# Patient Record
Sex: MALE | Race: WHITE | Employment: FULL TIME | URBAN - METROPOLITAN AREA
[De-identification: names, ages, dates, MRNs, and addresses within clinical notes are randomized per-mention and may not be internally consistent; named-entity substitution may affect disease eponyms.]

---

## 2017-12-15 ENCOUNTER — OFFICE VISIT (OUTPATIENT)
Dept: INTERNAL MEDICINE CLINIC | Age: 57
End: 2017-12-15

## 2017-12-15 VITALS
WEIGHT: 266.4 LBS | HEIGHT: 71 IN | SYSTOLIC BLOOD PRESSURE: 120 MMHG | TEMPERATURE: 97.9 F | HEART RATE: 95 BPM | BODY MASS INDEX: 37.3 KG/M2 | OXYGEN SATURATION: 98 % | DIASTOLIC BLOOD PRESSURE: 80 MMHG

## 2017-12-15 DIAGNOSIS — Z79.4 TYPE 2 DIABETES MELLITUS WITHOUT COMPLICATION, WITH LONG-TERM CURRENT USE OF INSULIN (HCC): Primary | ICD-10-CM

## 2017-12-15 DIAGNOSIS — I10 BENIGN ESSENTIAL HTN: ICD-10-CM

## 2017-12-15 DIAGNOSIS — M54.31 SCIATICA, RIGHT SIDE: ICD-10-CM

## 2017-12-15 DIAGNOSIS — E11.9 TYPE 2 DIABETES MELLITUS WITHOUT COMPLICATION, WITH LONG-TERM CURRENT USE OF INSULIN (HCC): Primary | ICD-10-CM

## 2017-12-15 DIAGNOSIS — R05.9 COUGH: ICD-10-CM

## 2017-12-15 LAB
GLUCOSE BLD-MCNC: 280 MG/DL
HBA1C MFR BLD: 12.7 %

## 2017-12-15 PROCEDURE — 82962 GLUCOSE BLOOD TEST: CPT | Performed by: INTERNAL MEDICINE

## 2017-12-15 PROCEDURE — 99214 OFFICE O/P EST MOD 30 MIN: CPT | Performed by: INTERNAL MEDICINE

## 2017-12-15 PROCEDURE — 83036 HEMOGLOBIN GLYCOSYLATED A1C: CPT | Performed by: INTERNAL MEDICINE

## 2017-12-15 RX ORDER — GLIPIZIDE 5 MG/1
TABLET, FILM COATED, EXTENDED RELEASE ORAL
Qty: 90 TABLET | Refills: 0 | Status: CANCELLED | OUTPATIENT
Start: 2017-12-15

## 2017-12-15 RX ORDER — BLOOD SUGAR DIAGNOSTIC
1 STRIP MISCELLANEOUS DAILY
Qty: 100 EACH | Refills: 3 | Status: SHIPPED | OUTPATIENT
Start: 2017-12-15 | End: 2019-09-23 | Stop reason: SDUPTHER

## 2017-12-15 RX ORDER — GABAPENTIN 300 MG/1
300 CAPSULE ORAL 3 TIMES DAILY PRN
Qty: 90 CAPSULE | Refills: 3 | Status: SHIPPED | OUTPATIENT
Start: 2017-12-15 | End: 2018-03-26

## 2017-12-15 RX ORDER — METFORMIN HYDROCHLORIDE 500 MG/1
TABLET, EXTENDED RELEASE ORAL
Qty: 120 TABLET | Refills: 3 | Status: SHIPPED | OUTPATIENT
Start: 2017-12-15 | End: 2018-12-28 | Stop reason: SDUPTHER

## 2017-12-15 RX ORDER — AZITHROMYCIN 250 MG/1
TABLET, FILM COATED ORAL
Qty: 1 PACKET | Refills: 0 | Status: SHIPPED | OUTPATIENT
Start: 2017-12-15 | End: 2017-12-25

## 2017-12-15 RX ORDER — ATORVASTATIN CALCIUM 20 MG/1
20 TABLET, FILM COATED ORAL DAILY
Qty: 30 TABLET | Refills: 3 | Status: SHIPPED | OUTPATIENT
Start: 2017-12-15 | End: 2018-12-28 | Stop reason: SDUPTHER

## 2017-12-15 RX ORDER — LISINOPRIL AND HYDROCHLOROTHIAZIDE 20; 12.5 MG/1; MG/1
2 TABLET ORAL DAILY
Qty: 60 TABLET | Refills: 5 | Status: SHIPPED | OUTPATIENT
Start: 2017-12-15 | End: 2018-05-18 | Stop reason: SDUPTHER

## 2017-12-15 ASSESSMENT — ENCOUNTER SYMPTOMS: BACK PAIN: 1

## 2017-12-15 ASSESSMENT — PATIENT HEALTH QUESTIONNAIRE - PHQ9
SUM OF ALL RESPONSES TO PHQ QUESTIONS 1-9: 0
1. LITTLE INTEREST OR PLEASURE IN DOING THINGS: 0
2. FEELING DOWN, DEPRESSED OR HOPELESS: 0
SUM OF ALL RESPONSES TO PHQ9 QUESTIONS 1 & 2: 0

## 2017-12-15 NOTE — PROGRESS NOTES
OUTPATIENT PROGRESS NOTE  Date of Service:  12/15/2017  Address: [address] [place] [address] INTERNAL MEDICINE  [address] [place] [address]  Dept: [phone]    Subjective:      Chief Complaint   Patient presents with    Diabetes     cough      Patient ID: [id number]  Troy Smith is a 61 y.o. male    HPIwith dm2, htn former smoker who was last seen 1.5 years ago. He has been taking all of his meds tammy nilly and now gets novolin N otc from [facility]  He has a cough right now x 2 weeks. Started out with malaise and fever and cough persists with some phlegm and occas wheeze. Also report pain down right sciatic which is severe and prevents him from walking around work. Using advil. Neurontin has helped in the past.    Allergies   Allergen Reactions    Codeine      itch    Tradjenta [Linagliptin] Hives     Current Outpatient Prescriptions   Medication Sig Dispense Refill    metFORMIN (GLUCOPHAGE-XR) 500 MG extended release tablet TAKE FOUR TABLETS BY MOUTH ONCE DAILY 120 tablet 0    atorvastatin (LIPITOR) 20 MG tablet Take 1 tablet by mouth daily To lower cholesterol 30 tablet 3    insulin NPH (NOVOLIN N) 100 UNIT/ML injection vial Start with 20 units every morning than raise insulin by 1 unit per day until sugars stay in 100-150 before supper 1 vial 3    Insulin Pen Needle ([employer] PEN NEEDLES 29G) 29G X 12MM MISC 1 each by Does not apply route daily 100 each 3    L-methylfolate-B6-B12 (METANX) 5-80.611-6-11 MG CAPS capsule Take 1 capsule by mouth daily 30 capsule 3    lisinopril-hydrochlorothiazide (PRINZIDE) 20-12.5 MG per tablet Take 2 tablets by mouth daily qam for htn 60 tablet 0    glipiZIDE (GLUCOTROL) 5 MG ER tablet One in the morning and two with supper 90 tablet 0    SODIUM CHLORIDE, EXTERNAL, (SALINE WOUND WASH) 0.9 % SOLN Apply 1 spray topically daily 1 Can 1    aspirin (ANACIN) 81 MG EC tablet Take 81 mg by mouth daily.        No current exhibits no tenderness. Just occas cough lungs clear   Abdominal: Soft. Bowel sounds are normal. There is no tenderness. Musculoskeletal: Normal range of motion. He exhibits no edema or tenderness. Right sciatic is tender walks with limp  Neg SLR LE strgth intact   Lymphadenopathy:     He has no cervical adenopathy. Neurological: He is alert. He has normal reflexes. No cranial nerve deficit. He exhibits normal muscle tone. Gait normal.   Skin: Skin is warm and dry. No rash noted. Diffusely diaphoretic   Psychiatric: Mood, memory, affect and judgment normal.   Nursing note and vitals reviewed. a1c 12.7  Glucose 270  Assessment/Plan   1. Type 2 diabetes mellitus without complication, with long-term current use of insulin (HCC)  Very high sugar  - POCT glycosylated hemoglobin (Hb A1C)  Very unmotivated    2. HTN   Not so high when I check it  Plan : cont same  3. High cholesterol   He has not taken statin  Will reorder it      3  Cough ? Sinus infection  zpak and otc syrup  4.  Sciatica no warnings signs  Plan ok to start with neurontin            Jose Gonzalez MD

## 2018-03-26 ENCOUNTER — OFFICE VISIT (OUTPATIENT)
Dept: INTERNAL MEDICINE CLINIC | Age: 58
End: 2018-03-26

## 2018-03-26 VITALS
BODY MASS INDEX: 38.64 KG/M2 | OXYGEN SATURATION: 97 % | HEART RATE: 97 BPM | TEMPERATURE: 97.9 F | WEIGHT: 276 LBS | SYSTOLIC BLOOD PRESSURE: 136 MMHG | DIASTOLIC BLOOD PRESSURE: 86 MMHG | HEIGHT: 71 IN

## 2018-03-26 DIAGNOSIS — E11.9 TYPE 2 DIABETES MELLITUS WITHOUT COMPLICATION, WITH LONG-TERM CURRENT USE OF INSULIN (HCC): Primary | ICD-10-CM

## 2018-03-26 DIAGNOSIS — R09.89 DECREASED PEDAL PULSES: ICD-10-CM

## 2018-03-26 DIAGNOSIS — Z79.4 TYPE 2 DIABETES MELLITUS WITHOUT COMPLICATION, WITH LONG-TERM CURRENT USE OF INSULIN (HCC): Primary | ICD-10-CM

## 2018-03-26 DIAGNOSIS — I10 BENIGN ESSENTIAL HTN: ICD-10-CM

## 2018-03-26 DIAGNOSIS — E11.42 DIABETIC POLYNEUROPATHY ASSOCIATED WITH TYPE 2 DIABETES MELLITUS (HCC): ICD-10-CM

## 2018-03-26 LAB — HBA1C MFR BLD: 13.2 %

## 2018-03-26 PROCEDURE — 83036 HEMOGLOBIN GLYCOSYLATED A1C: CPT | Performed by: INTERNAL MEDICINE

## 2018-03-26 PROCEDURE — 99214 OFFICE O/P EST MOD 30 MIN: CPT | Performed by: INTERNAL MEDICINE

## 2018-03-26 RX ORDER — GLIPIZIDE 10 MG/1
TABLET, FILM COATED, EXTENDED RELEASE ORAL
Qty: 60 TABLET | Refills: 3 | Status: SHIPPED | OUTPATIENT
Start: 2018-03-26 | End: 2018-12-28 | Stop reason: SDUPTHER

## 2018-03-26 NOTE — PROGRESS NOTES
BP Readings from Last 2 Encounters:   03/26/18 136/86   12/15/17 120/80     Hemoglobin A1C (%)   Date Value   12/15/2017 12.7     Microalbumin, Random Urine (mg/dL)   Date Value   05/03/2016 7.30 (H)     LDL Calculated (mg/dL)   Date Value   11/25/2013 94              Tobacco use:  Patient  reports that he quit smoking about 3 years ago. His smoking use included Cigars. He has never used smokeless tobacco.    If Smoker - Cessation materials given? No    Is Daily aspirin on medication list?:  Yes    Diabetic retinal exam done? Yes   If yes, document in Health Maintenance. Monofilament placed on counter? Yes    Shoes and socks removed? Yes    BP taken with correct size cuff? Yes   Repeated if > 130/80 No     Microalbumin performed if applicable?   Yes

## 2018-04-17 ENCOUNTER — TELEPHONE (OUTPATIENT)
Dept: INTERNAL MEDICINE CLINIC | Age: 58
End: 2018-04-17

## 2018-05-18 RX ORDER — LISINOPRIL AND HYDROCHLOROTHIAZIDE 20; 12.5 MG/1; MG/1
TABLET ORAL
Qty: 180 TABLET | Refills: 1 | Status: SHIPPED | OUTPATIENT
Start: 2018-05-18 | End: 2018-12-28 | Stop reason: SDUPTHER

## 2019-01-15 ENCOUNTER — HOSPITAL ENCOUNTER (OUTPATIENT)
Dept: NON INVASIVE DIAGNOSTICS | Age: 59
Discharge: HOME | End: 2019-01-15
Payer: COMMERCIAL

## 2019-01-15 DIAGNOSIS — R07.9 CHEST PAIN, UNSPECIFIED TYPE: ICD-10-CM

## 2019-01-15 LAB
LV EF: 58 %
LV EF: 68 %
LVEF MODALITY: NORMAL
LVEF MODALITY: NORMAL

## 2019-01-15 PROCEDURE — 78452 HT MUSCLE IMAGE SPECT MULT: CPT

## 2019-01-15 PROCEDURE — C8929 TTE W OR WO FOL WCON,DOPPLER: HCPCS

## 2019-01-15 PROCEDURE — 3430000000 HC RX DIAGNOSTIC RADIOPHARMACEUTICAL: Performed by: INTERNAL MEDICINE

## 2019-01-15 PROCEDURE — 93306 TTE W/DOPPLER COMPLETE: CPT

## 2019-01-15 PROCEDURE — 6360000002 HC RX W HCPCS: Performed by: INTERNAL MEDICINE

## 2019-01-15 PROCEDURE — 93017 CV STRESS TEST TRACING ONLY: CPT

## 2019-01-15 PROCEDURE — A9502 TC99M TETROFOSMIN: HCPCS | Performed by: INTERNAL MEDICINE

## 2019-01-15 PROCEDURE — 6360000004 HC RX CONTRAST MEDICATION: Performed by: INTERNAL MEDICINE

## 2019-01-15 RX ADMIN — TETROFOSMIN 30 MILLICURIE: 0.23 INJECTION, POWDER, LYOPHILIZED, FOR SOLUTION INTRAVENOUS at 09:48

## 2019-01-15 RX ADMIN — REGADENOSON 0.4 MG: 0.08 INJECTION, SOLUTION INTRAVENOUS at 09:45

## 2019-01-15 RX ADMIN — TETROFOSMIN 10 MILLICURIE: 0.23 INJECTION, POWDER, LYOPHILIZED, FOR SOLUTION INTRAVENOUS at 08:36

## 2019-01-15 RX ADMIN — PERFLUTREN 1.43 MG: 6.52 INJECTION, SUSPENSION INTRAVENOUS at 10:41

## 2019-04-02 ENCOUNTER — OFFICE VISIT (OUTPATIENT)
Dept: FAMILY MEDICINE CLINIC | Age: 59
End: 2019-04-02
Payer: COMMERCIAL

## 2019-04-02 VITALS
BODY MASS INDEX: 38.42 KG/M2 | OXYGEN SATURATION: 98 % | SYSTOLIC BLOOD PRESSURE: 138 MMHG | TEMPERATURE: 98.8 F | HEART RATE: 94 BPM | RESPIRATION RATE: 20 BRPM | HEIGHT: 71 IN | WEIGHT: 274.4 LBS | DIASTOLIC BLOOD PRESSURE: 62 MMHG

## 2019-04-02 DIAGNOSIS — E78.00 HIGH CHOLESTEROL: ICD-10-CM

## 2019-04-02 DIAGNOSIS — I10 BENIGN ESSENTIAL HTN: ICD-10-CM

## 2019-04-02 DIAGNOSIS — Z12.11 SCREENING FOR COLON CANCER: ICD-10-CM

## 2019-04-02 DIAGNOSIS — E11.9 TYPE 2 DIABETES MELLITUS WITHOUT COMPLICATION, WITH LONG-TERM CURRENT USE OF INSULIN (HCC): Primary | ICD-10-CM

## 2019-04-02 DIAGNOSIS — Z79.4 TYPE 2 DIABETES MELLITUS WITHOUT COMPLICATION, WITH LONG-TERM CURRENT USE OF INSULIN (HCC): Primary | ICD-10-CM

## 2019-04-02 LAB — HBA1C MFR BLD: 14 %

## 2019-04-02 PROCEDURE — 99203 OFFICE O/P NEW LOW 30 MIN: CPT | Performed by: FAMILY MEDICINE

## 2019-04-02 PROCEDURE — 83036 HEMOGLOBIN GLYCOSYLATED A1C: CPT | Performed by: FAMILY MEDICINE

## 2019-04-02 RX ORDER — METFORMIN HYDROCHLORIDE 500 MG/1
TABLET, EXTENDED RELEASE ORAL
Qty: 120 TABLET | Refills: 3 | Status: SHIPPED | OUTPATIENT
Start: 2019-04-02 | End: 2019-09-23 | Stop reason: DRUGHIGH

## 2019-04-02 RX ORDER — LISINOPRIL AND HYDROCHLOROTHIAZIDE 20; 12.5 MG/1; MG/1
TABLET ORAL
Qty: 180 TABLET | Refills: 1 | Status: CANCELLED | OUTPATIENT
Start: 2019-04-02

## 2019-04-02 RX ORDER — GLIPIZIDE 10 MG/1
TABLET, FILM COATED, EXTENDED RELEASE ORAL
Qty: 60 TABLET | Refills: 3 | Status: SHIPPED | OUTPATIENT
Start: 2019-04-02 | End: 2019-09-23 | Stop reason: SDUPTHER

## 2019-04-02 RX ORDER — ATORVASTATIN CALCIUM 20 MG/1
20 TABLET, FILM COATED ORAL NIGHTLY
Qty: 30 TABLET | Refills: 3 | Status: SHIPPED | OUTPATIENT
Start: 2019-04-02 | End: 2019-09-23 | Stop reason: SDUPTHER

## 2019-04-02 ASSESSMENT — ENCOUNTER SYMPTOMS
SORE THROAT: 0
WHEEZING: 0
ABDOMINAL PAIN: 0
EYE REDNESS: 0
VOMITING: 0
COUGH: 0
DIARRHEA: 0
SHORTNESS OF BREATH: 0
COLOR CHANGE: 0
TROUBLE SWALLOWING: 0
EYE PAIN: 0

## 2019-04-02 ASSESSMENT — PATIENT HEALTH QUESTIONNAIRE - PHQ9
SUM OF ALL RESPONSES TO PHQ QUESTIONS 1-9: 0
1. LITTLE INTEREST OR PLEASURE IN DOING THINGS: 0
2. FEELING DOWN, DEPRESSED OR HOPELESS: 0
SUM OF ALL RESPONSES TO PHQ QUESTIONS 1-9: 0
SUM OF ALL RESPONSES TO PHQ9 QUESTIONS 1 & 2: 0

## 2019-04-02 NOTE — PROGRESS NOTES
[facility] Medicine  Clinic Note    Date: 4/2/2019                                               Subjective:     Chief Complaint   Patient presents with    New Patient     NEW PATIENT TO BE ESTABLISHED  FORMER PATIENT OF DR MINAYA ,      HPI  Here to establish, hx uncontrolled DM. Diagnosed about 15-20 years ago. His GF is Beba Piña, who also has uncontrolled DM. Last A1c in Dec, thinks was 14 or 15. On insulin and supposed to take 50 in am and takes some at night based on how often he urinates. Used to be on 75 units twice daily but current needles only handle 50  units. Didn't remember insulin last night, took 50 units this morning. Tries to take insulin at bedtime 25-35 units depending on how much he urinates. Gets up 6-7 times per night urinating. Gets hard to walk on uneven ground, may take side step at work site. Takes 2 metofmrin in [address] and takes 2 at night none this week. Takes 2 Glipizide in monring and sometimes 1 at night to try and get some sleep. Never any episodes of hypoglycemia. At 290 starts to feel it. Has known neuropathy, has to use Cialsis for ED, last eye exam middle of last year, minor retinopathy. Eyes feel as if they cross sometimes when really tired. Was on Victoza but cost prohibitive, wants to get back on normal routine. \"Minor heart attack\" 12-13 years ago per pt, had normal cath att hat time, 2 normal stress tests, the most recent Jan 2019. On statin and baby aspirin. Used to drink in the past but not now. Smokes the occ cigar.       Wt Readings from Last 3 Encounters:   04/02/19 274 lb 6.4 oz (124.5 kg)   01/28/19 265 lb 9.6 oz (120.5 kg)   12/28/18 263 lb (119.3 kg)             Patient Active Problem List    Diagnosis Date Noted    High cholesterol 04/02/2019    HTN (hypertension) 04/10/2013    DM2 (diabetes mellitus, type 2) ([facility] [address] 04/10/2013     Past Medical History:   Diagnosis Date    Carbuncle 2013    requiring i and d in hospital/[facility], now avoids them with adam tree oil    DM2 (diabetes mellitus, type 2) ([facility] [address] 2003    ED (erectile dysfunction)     cialis works well /viagra causes panic    Former smoker     cigar    History of heart attack 2005    HTN (hypertension)     Hyperlipidemia     Peripheral neuropathy     both legs and feet tingle/numb/ worse left     Past Surgical History:   Procedure Laterality Date    CARDIAC CATHETERIZATION  2006    normal    CARDIOVASCULAR STRESS TEST  04/16/2012    normal    CARDIOVASCULAR STRESS TEST  01/2019    normal    CYST REMOVAL      PILONIDAL CYST EXCISION       Office Visit on 12/28/2018   Component Date Value Ref Range Status    Hemoglobin A1C 12/28/2018 14.0  % Final    WBC 12/28/2018 7.1  4.0 - 11.0 K/uL Final    RBC 12/28/2018 5.84  4.20 - 5.90 M/uL Final    Hemoglobin 12/28/2018 15.1  13.5 - 17.5 g/dL Final    Hematocrit 12/28/2018 47.1  40.5 - 52.5 % Final    MCV 12/28/2018 80.7  80.0 - 100.0 fL Final    MCH 12/28/2018 25.9* 26.0 - 34.0 pg Final    MCHC 12/28/2018 32.1  31.0 - 36.0 g/dL Final    RDW 12/28/2018 13.7  12.4 - 15.4 % Final    Platelets 58/69/1314 137  135 - 450 K/uL Final    MPV 12/28/2018 9.9  5.0 - 10.5 fL Final    Neutrophils % 12/28/2018 54.1  % Final    Lymphocytes % 12/28/2018 36.7  % Final    Monocytes % 12/28/2018 7.1  % Final    Eosinophils % 12/28/2018 1.8  % Final    Basophils % 12/28/2018 0.3  % Final    Neutrophils # 12/28/2018 3.8  1.7 - 7.7 K/uL Final    Lymphocytes # 12/28/2018 2.6  1.0 - 5.1 K/uL Final    Monocytes # 12/28/2018 0.5  0.0 - 1.3 K/uL Final    Eosinophils # 12/28/2018 0.1  0.0 - 0.6 K/uL Final    Basophils # 12/28/2018 0.0  0.0 - 0.2 K/uL Final    Sodium 12/28/2018 141  136 - 145 mmol/L Final    Potassium 12/28/2018 4.5  3.5 - 5.1 mmol/L Final    Chloride 12/28/2018 99  99 - 110 mmol/L Final    CO2 12/28/2018 26  21 - 32 mmol/L Final    Anion Gap 12/28/2018 16  3 - 16 Final    Glucose 12/28/2018 308* 70 - 99 mg/dL Age of Onset    Lung Cancer Mother     Lung Cancer Father      Current Outpatient Medications   Medication Sig Dispense Refill    metFORMIN (GLUCOPHAGE-XR) 500 MG extended release tablet TAKE FOUR TABLETS BY MOUTH ONCE DAILY 120 tablet 3    atorvastatin (LIPITOR) 20 MG tablet Take 1 tablet by mouth nightly To lower cholesterol 30 tablet 3    glipiZIDE (GLUCOTROL XL) 10 MG extended release tablet One po bid 60 tablet 3    insulin NPH (NOVOLIN N) 100 UNIT/ML injection vial 50 units in am, 50 units in pm 4 vial 3    omeprazole (PRILOSEC) 20 MG delayed release capsule Take 1 capsule by mouth every morning (before breakfast) 30 capsule 1    lisinopril-hydrochlorothiazide (PRINZIDE;ZESTORETIC) 20-12.5 MG per tablet TAKE TWO TABLETS BY MOUTH IN THE MORNING FOR  tablet 1    Insulin Syringe-Needle U-100 ([employer]OGER INSULIN SYRINGE) 31G X 5/16\" 1 ML MISC 1 each by Does not apply route daily 100 each 3    SODIUM CHLORIDE, EXTERNAL, (SALINE WOUND WASH) 0.9 % SOLN Apply 1 spray topically daily 1 Can 1    aspirin (ANACIN) 81 MG EC tablet Take 81 mg by mouth daily. No current facility-administered medications for this visit. Allergies   Allergen Reactions    Codeine      itch    Tradjenta [Linagliptin] Hives       Review of Systems   Constitutional: Negative for activity change, chills and fever. HENT: Negative for ear pain, sore throat and trouble swallowing. Eyes: Positive for visual disturbance. Negative for pain and redness. Respiratory: Negative for cough, shortness of breath and wheezing. Cardiovascular: Negative for chest pain, palpitations and leg swelling. Gastrointestinal: Negative for abdominal pain, diarrhea and vomiting. Endocrine: Positive for polydipsia and polyuria. Negative for polyphagia. Genitourinary: Positive for frequency. Negative for difficulty urinating and dysuria. Musculoskeletal: Positive for arthralgias. Negative for gait problem and myalgias.    Skin: Negative for color change, rash and wound. Neurological: Positive for numbness. Negative for tremors, seizures and syncope. Psychiatric/Behavioral: Positive for sleep disturbance. Negative for agitation and dysphoric mood. Objective:  /62 (Site: Left Upper Arm, Position: Sitting, Cuff Size: Medium Adult)   Pulse 94   Temp 98.8 °F (37.1 °C) (Oral)   Resp 20   Ht 5' 11\" (1.803 m) Comment: WITH SHOES  Wt 274 lb 6.4 oz (124.5 kg) Comment: WITH SHOES  SpO2 98%   BMI 38.27 kg/m²     Physical Exam   Constitutional: He is oriented to person, place, and time. He appears well-developed and well-nourished. He is cooperative. No distress. HENT:   Head: Normocephalic and atraumatic. Right Ear: Hearing, tympanic membrane, external ear and ear canal normal.   Left Ear: Hearing, tympanic membrane, external ear and ear canal normal.   Mouth/Throat: Uvula is midline, oropharynx is clear and moist and mucous membranes are normal.   Eyes: Conjunctivae, EOM and lids are normal.   Neck: Neck supple. Cardiovascular: Normal rate and regular rhythm. Pulmonary/Chest: Effort normal and breath sounds normal.   Abdominal: Soft. Normal appearance. He exhibits no distension. Neurological: He is alert and oriented to person, place, and time. Skin: Skin is warm and dry. He is not diaphoretic. Psychiatric: He has a normal mood and affect. His speech is normal and behavior is normal.   Nursing note and vitals reviewed.     Visual inspection:  Deformity/amputation: absent  Skin lesions/pre-ulcerative calluses: absent  Edema: right- negative, left- negative    Sensory exam:  Monofilament sensation: abnormal - diminished or absent throughout  (minimum of 5 random plantar locations tested, avoiding callused areas - > 1 area with absence of sensation is + for neuropathy)    Plus at least one of the following:  Pulses: normal,   Pinprick: N/A  Proprioception: Intact  Vibration (128 Hz): N/A    Assessment/Plan:   Lucy Baldwin was seen today for new patient. Diagnoses and all orders for this visit:    Type 2 diabetes mellitus without complication, with long-term current use of insulin ([facility])  -     POCT glycosylated hemoglobin (Hb A1C)  -     metFORMIN (GLUCOPHAGE-XR) 500 MG extended release tablet; TAKE FOUR TABLETS BY MOUTH ONCE DAILY  -     glipiZIDE (GLUCOTROL XL) 10 MG extended release tablet; One po bid  -     insulin NPH (NOVOLIN N) 100 UNIT/ML injection vial; 50 units in am, 50 units in pm  -     CBC Auto Differential; Future  -     Comprehensive Metabolic Panel; Future  -     TSH with Reflex; Future  -      DIABETES FOOT EXAM  -     Hepatitis C Antibody; Future    High cholesterol  -     atorvastatin (LIPITOR) 20 MG tablet; Take 1 tablet by mouth nightly To lower cholesterol  -     Lipid Panel; Future    Benign essential HTN    Screening for colon cancer  -     COLOGUARD; Future    Counseled patient for >50% appointment time on disease pathophysiology, management, answering questions; total time 30 minutes. Return in about 6 weeks (around 5/14/2019), or review labs, bring glucose readings with you.     [id number] [place] [address],   4/2/2019  4:23 PM

## 2019-09-23 ENCOUNTER — OFFICE VISIT (OUTPATIENT)
Dept: FAMILY MEDICINE CLINIC | Age: 59
End: 2019-09-23
Payer: COMMERCIAL

## 2019-09-23 VITALS
DIASTOLIC BLOOD PRESSURE: 86 MMHG | RESPIRATION RATE: 18 BRPM | SYSTOLIC BLOOD PRESSURE: 136 MMHG | WEIGHT: 259 LBS | HEART RATE: 82 BPM | BODY MASS INDEX: 36.26 KG/M2 | TEMPERATURE: 98.5 F | HEIGHT: 71 IN

## 2019-09-23 DIAGNOSIS — Z79.4 TYPE 2 DIABETES MELLITUS WITHOUT COMPLICATION, WITH LONG-TERM CURRENT USE OF INSULIN (HCC): Primary | ICD-10-CM

## 2019-09-23 DIAGNOSIS — B02.9 HERPES ZOSTER WITHOUT COMPLICATION: ICD-10-CM

## 2019-09-23 DIAGNOSIS — E78.00 HIGH CHOLESTEROL: ICD-10-CM

## 2019-09-23 DIAGNOSIS — I10 BENIGN ESSENTIAL HTN: ICD-10-CM

## 2019-09-23 DIAGNOSIS — E11.9 TYPE 2 DIABETES MELLITUS WITHOUT COMPLICATION, WITH LONG-TERM CURRENT USE OF INSULIN (HCC): Primary | ICD-10-CM

## 2019-09-23 LAB — HBA1C MFR BLD: 14 %

## 2019-09-23 PROCEDURE — 99214 OFFICE O/P EST MOD 30 MIN: CPT | Performed by: FAMILY MEDICINE

## 2019-09-23 PROCEDURE — 83036 HEMOGLOBIN GLYCOSYLATED A1C: CPT | Performed by: FAMILY MEDICINE

## 2019-09-23 RX ORDER — BLOOD SUGAR DIAGNOSTIC
1 STRIP MISCELLANEOUS DAILY
Qty: 100 EACH | Refills: 3 | Status: SHIPPED | OUTPATIENT
Start: 2019-09-23 | End: 2021-02-17 | Stop reason: SDUPTHER

## 2019-09-23 RX ORDER — ATORVASTATIN CALCIUM 20 MG/1
20 TABLET, FILM COATED ORAL NIGHTLY
Qty: 30 TABLET | Refills: 5 | Status: SHIPPED | OUTPATIENT
Start: 2019-09-23 | End: 2020-11-13 | Stop reason: SDUPTHER

## 2019-09-23 RX ORDER — LISINOPRIL AND HYDROCHLOROTHIAZIDE 20; 12.5 MG/1; MG/1
TABLET ORAL
Qty: 60 TABLET | Refills: 5 | Status: SHIPPED | OUTPATIENT
Start: 2019-09-23 | End: 2020-11-13 | Stop reason: SDUPTHER

## 2019-09-23 RX ORDER — VALACYCLOVIR HYDROCHLORIDE 1 G/1
1000 TABLET, FILM COATED ORAL 3 TIMES DAILY
Qty: 21 TABLET | Refills: 0 | Status: SHIPPED | OUTPATIENT
Start: 2019-09-23 | End: 2019-09-30

## 2019-09-23 RX ORDER — GLIPIZIDE 10 MG/1
TABLET, FILM COATED, EXTENDED RELEASE ORAL
Qty: 60 TABLET | Refills: 5 | Status: SHIPPED | OUTPATIENT
Start: 2019-09-23 | End: 2020-03-24 | Stop reason: SDUPTHER

## 2019-09-24 ASSESSMENT — ENCOUNTER SYMPTOMS
BACK PAIN: 1
SHORTNESS OF BREATH: 0

## 2019-10-23 ENCOUNTER — OFFICE VISIT (OUTPATIENT)
Dept: FAMILY MEDICINE CLINIC | Age: 59
End: 2019-10-23
Payer: COMMERCIAL

## 2019-10-23 VITALS
SYSTOLIC BLOOD PRESSURE: 128 MMHG | BODY MASS INDEX: 36.65 KG/M2 | DIASTOLIC BLOOD PRESSURE: 80 MMHG | HEART RATE: 78 BPM | WEIGHT: 261.8 LBS | TEMPERATURE: 98.6 F | HEIGHT: 71 IN | RESPIRATION RATE: 16 BRPM

## 2019-10-23 DIAGNOSIS — Z79.4 TYPE 2 DIABETES MELLITUS WITHOUT COMPLICATION, WITH LONG-TERM CURRENT USE OF INSULIN (HCC): Primary | ICD-10-CM

## 2019-10-23 DIAGNOSIS — E11.9 TYPE 2 DIABETES MELLITUS WITHOUT COMPLICATION, WITH LONG-TERM CURRENT USE OF INSULIN (HCC): Primary | ICD-10-CM

## 2019-10-23 PROCEDURE — 99213 OFFICE O/P EST LOW 20 MIN: CPT | Performed by: FAMILY MEDICINE

## 2019-11-22 ENCOUNTER — OFFICE VISIT (OUTPATIENT)
Dept: FAMILY MEDICINE CLINIC | Age: 59
End: 2019-11-22
Payer: COMMERCIAL

## 2019-11-22 VITALS
TEMPERATURE: 98.6 F | HEART RATE: 66 BPM | WEIGHT: 277.2 LBS | SYSTOLIC BLOOD PRESSURE: 132 MMHG | DIASTOLIC BLOOD PRESSURE: 82 MMHG | BODY MASS INDEX: 38.81 KG/M2 | RESPIRATION RATE: 14 BRPM | HEIGHT: 71 IN

## 2019-11-22 DIAGNOSIS — Z79.4 TYPE 2 DIABETES MELLITUS WITHOUT COMPLICATION, WITH LONG-TERM CURRENT USE OF INSULIN (HCC): ICD-10-CM

## 2019-11-22 DIAGNOSIS — R26.9 GAIT ABNORMALITY: ICD-10-CM

## 2019-11-22 DIAGNOSIS — E11.9 TYPE 2 DIABETES MELLITUS WITHOUT COMPLICATION, WITH LONG-TERM CURRENT USE OF INSULIN (HCC): ICD-10-CM

## 2019-11-22 PROCEDURE — 99213 OFFICE O/P EST LOW 20 MIN: CPT | Performed by: NURSE PRACTITIONER

## 2019-11-22 RX ORDER — METFORMIN HYDROCHLORIDE 500 MG/1
1000 TABLET, EXTENDED RELEASE ORAL 2 TIMES DAILY WITH MEALS
Qty: 180 TABLET | Refills: 1 | Status: SHIPPED | OUTPATIENT
Start: 2019-11-22 | End: 2020-03-24 | Stop reason: SDUPTHER

## 2019-12-02 ENCOUNTER — HOSPITAL ENCOUNTER (OUTPATIENT)
Dept: PHYSICAL THERAPY | Age: 59
Setting detail: THERAPIES SERIES
Discharge: HOME | End: 2019-12-02
Payer: COMMERCIAL

## 2019-12-02 PROCEDURE — 97530 THERAPEUTIC ACTIVITIES: CPT

## 2019-12-02 PROCEDURE — 97110 THERAPEUTIC EXERCISES: CPT

## 2019-12-02 PROCEDURE — 97161 PT EVAL LOW COMPLEX 20 MIN: CPT

## 2019-12-02 ASSESSMENT — PAIN DESCRIPTION - LOCATION: LOCATION: FOOT

## 2019-12-02 ASSESSMENT — PAIN - FUNCTIONAL ASSESSMENT: PAIN_FUNCTIONAL_ASSESSMENT: PREVENTS OR INTERFERES WITH MANY ACTIVE NOT PASSIVE ACTIVITIES

## 2019-12-02 ASSESSMENT — PAIN DESCRIPTION - PROGRESSION: CLINICAL_PROGRESSION: GRADUALLY WORSENING

## 2019-12-02 ASSESSMENT — PAIN DESCRIPTION - ORIENTATION: ORIENTATION: RIGHT;LEFT

## 2019-12-04 ENCOUNTER — APPOINTMENT (OUTPATIENT)
Dept: PHYSICAL THERAPY | Age: 59
End: 2019-12-04
Payer: COMMERCIAL

## 2019-12-04 ENCOUNTER — HOSPITAL ENCOUNTER (OUTPATIENT)
Dept: PHYSICAL THERAPY | Age: 59
Setting detail: THERAPIES SERIES
Discharge: HOME | End: 2019-12-04
Payer: COMMERCIAL

## 2019-12-04 PROCEDURE — 97110 THERAPEUTIC EXERCISES: CPT

## 2019-12-09 ENCOUNTER — HOSPITAL ENCOUNTER (OUTPATIENT)
Dept: PHYSICAL THERAPY | Age: 59
Setting detail: THERAPIES SERIES
Discharge: HOME | End: 2019-12-09
Payer: COMMERCIAL

## 2019-12-09 PROCEDURE — 97530 THERAPEUTIC ACTIVITIES: CPT

## 2019-12-09 PROCEDURE — 97110 THERAPEUTIC EXERCISES: CPT

## 2019-12-10 ENCOUNTER — APPOINTMENT (OUTPATIENT)
Dept: PHYSICAL THERAPY | Age: 59
End: 2019-12-10
Payer: COMMERCIAL

## 2019-12-11 ENCOUNTER — HOSPITAL ENCOUNTER (OUTPATIENT)
Dept: PHYSICAL THERAPY | Age: 59
Setting detail: THERAPIES SERIES
Discharge: HOME | End: 2019-12-11
Payer: COMMERCIAL

## 2019-12-11 ENCOUNTER — APPOINTMENT (OUTPATIENT)
Dept: PHYSICAL THERAPY | Age: 59
End: 2019-12-11
Payer: COMMERCIAL

## 2019-12-16 ENCOUNTER — HOSPITAL ENCOUNTER (OUTPATIENT)
Dept: PHYSICAL THERAPY | Age: 59
Setting detail: THERAPIES SERIES
Discharge: HOME | End: 2019-12-16
Payer: COMMERCIAL

## 2019-12-16 ENCOUNTER — APPOINTMENT (OUTPATIENT)
Dept: PHYSICAL THERAPY | Age: 59
End: 2019-12-16
Payer: COMMERCIAL

## 2019-12-18 ENCOUNTER — APPOINTMENT (OUTPATIENT)
Dept: PHYSICAL THERAPY | Age: 59
End: 2019-12-18
Payer: COMMERCIAL

## 2019-12-23 ENCOUNTER — OFFICE VISIT (OUTPATIENT)
Dept: FAMILY MEDICINE CLINIC | Age: 59
End: 2019-12-23
Payer: COMMERCIAL

## 2019-12-23 ENCOUNTER — APPOINTMENT (OUTPATIENT)
Dept: PHYSICAL THERAPY | Age: 59
End: 2019-12-23
Payer: COMMERCIAL

## 2019-12-23 VITALS
DIASTOLIC BLOOD PRESSURE: 72 MMHG | HEIGHT: 71 IN | TEMPERATURE: 98.6 F | RESPIRATION RATE: 16 BRPM | HEART RATE: 72 BPM | BODY MASS INDEX: 37.74 KG/M2 | SYSTOLIC BLOOD PRESSURE: 118 MMHG | WEIGHT: 269.6 LBS

## 2019-12-23 DIAGNOSIS — E11.65 UNCONTROLLED TYPE 2 DIABETES MELLITUS WITH HYPERGLYCEMIA (HCC): Primary | ICD-10-CM

## 2019-12-23 DIAGNOSIS — R26.81 UNSTEADY GAIT: ICD-10-CM

## 2019-12-23 DIAGNOSIS — R41.3 MEMORY CHANGE: ICD-10-CM

## 2019-12-23 DIAGNOSIS — E78.00 HIGH CHOLESTEROL: ICD-10-CM

## 2019-12-23 LAB
ANION GAP SERPL CALCULATED.3IONS-SCNC: 19 MMOL/L (ref 3–16)
BUN BLDV-MCNC: 20 MG/DL (ref 7–20)
CALCIUM SERPL-MCNC: 10.2 MG/DL (ref 8.3–10.6)
CHLORIDE BLD-SCNC: 96 MMOL/L (ref 99–110)
CO2: 22 MMOL/L (ref 21–32)
CREAT SERPL-MCNC: 0.9 MG/DL (ref 0.8–1.3)
GFR AFRICAN AMERICAN: >60
GFR NON-AFRICAN AMERICAN: >60
GLUCOSE BLD-MCNC: 351 MG/DL (ref 70–99)
HBA1C MFR BLD: 14 %
POTASSIUM SERPL-SCNC: 4.9 MMOL/L (ref 3.5–5.1)
SODIUM BLD-SCNC: 137 MMOL/L (ref 136–145)

## 2019-12-23 PROCEDURE — 99214 OFFICE O/P EST MOD 30 MIN: CPT | Performed by: NURSE PRACTITIONER

## 2019-12-23 PROCEDURE — 83036 HEMOGLOBIN GLYCOSYLATED A1C: CPT | Performed by: NURSE PRACTITIONER

## 2019-12-24 ENCOUNTER — TELEPHONE (OUTPATIENT)
Dept: FAMILY MEDICINE CLINIC | Age: 59
End: 2019-12-24

## 2019-12-24 ENCOUNTER — HOSPITAL ENCOUNTER (OUTPATIENT)
Dept: MRI IMAGING | Age: 59
Discharge: HOME | End: 2019-12-24
Payer: COMMERCIAL

## 2019-12-24 DIAGNOSIS — R26.81 UNSTEADY GAIT: ICD-10-CM

## 2019-12-24 PROCEDURE — 2580000003 HC RX 258: Performed by: NURSE PRACTITIONER

## 2019-12-24 PROCEDURE — 6360000004 HC RX CONTRAST MEDICATION: Performed by: NURSE PRACTITIONER

## 2019-12-24 PROCEDURE — A9577 INJ MULTIHANCE: HCPCS | Performed by: NURSE PRACTITIONER

## 2019-12-24 PROCEDURE — 70553 MRI BRAIN STEM W/O & W/DYE: CPT

## 2019-12-24 RX ORDER — SODIUM CHLORIDE 0.9 % (FLUSH) 0.9 %
10 SYRINGE (ML) INJECTION PRN
Status: DISCONTINUED | OUTPATIENT
Start: 2019-12-24 | End: 2019-12-25 | Stop reason: HOSPADM

## 2019-12-24 RX ORDER — SODIUM CHLORIDE 0.9 % (FLUSH) 0.9 %
10 SYRINGE (ML) INJECTION EVERY 12 HOURS SCHEDULED
Status: DISCONTINUED | OUTPATIENT
Start: 2019-12-24 | End: 2019-12-25 | Stop reason: HOSPADM

## 2019-12-24 RX ADMIN — GADOBENATE DIMEGLUMINE 22 ML: 529 INJECTION, SOLUTION INTRAVENOUS at 08:35

## 2019-12-24 RX ADMIN — Medication 10 ML: at 08:35

## 2019-12-27 ENCOUNTER — APPOINTMENT (OUTPATIENT)
Dept: PHYSICAL THERAPY | Age: 59
End: 2019-12-27
Payer: COMMERCIAL

## 2019-12-30 ENCOUNTER — TELEPHONE (OUTPATIENT)
Dept: FAMILY MEDICINE CLINIC | Age: 59
End: 2019-12-30

## 2019-12-30 ENCOUNTER — APPOINTMENT (OUTPATIENT)
Dept: PHYSICAL THERAPY | Age: 59
End: 2019-12-30
Payer: COMMERCIAL

## 2019-12-30 NOTE — TELEPHONE ENCOUNTER
I SPOKE TO PT AND ADVISED OF HIS RESULTS- PER LUCY HE NEEDED TO F/U WITH ENDO FOR HIS DM. PT STATED HE CAN NOT SCHEDULE WITH THEM DUE TO HIS FINANCES BUT HE IS REALLY WORKING ON HIS BLOOD SUGARS. THEY WERE RUNNING IN 'S AND HE HAS THEM DOWN UNDER 300 CONSISTENTLY. THIS AM IT . HE WAS ADVISED HE WILL HAVE TO HAVE A FOLLOW UP TO CHECK HIS A1C IN 3 MONTHS AND HE WENT AHEAD AND SCHEDULED FOR 3/24/19 WITH [facility]. HE WANTS YOU TO KNOW HE IS REALLY TRYING ON HIS SUGARS AND WANTS TO DO WHAT YOU WANT HE JUST REALLY CAN NOT AFFORD TO GO SEE ENDO RIGHT NOW. CAN CLOSE MSG WHEN READ.

## 2020-01-04 ENCOUNTER — TELEPHONE (OUTPATIENT)
Dept: FAMILY MEDICINE CLINIC | Age: 60
End: 2020-01-04

## 2020-01-04 NOTE — TELEPHONE ENCOUNTER
Unable to get diabetic medicines. Was given Basaglar. Told to start at 20 IU and is up to 45 units and is 210-250. Went up to get another refill. Pharmacist told him to contact us because he runs out early. Was dizzy and better since he got better numbers. He says he does not eat many sweets. Explained that if that is the case then it is carbs and sweets causing his blood sugars to go up. Still eats ice cream every Friday. Encouraged the patient when he eats ice cream to take a cup and put to small scoops. Explained that if he takes small bites he can like the ice cream and he tastes every molecule going in. If a bite is 1 inch thick he is not tasting it it is just raising his blood sugar. His girlfriend told him the same thing. Ice cream cones are better. Virtually every molecule touches your tongue - you taste with a fraction of the calories. Type 2 diabetes mellitus with hyperglycemia, with long-term current use of insulin (HCC)  -     insulin glargine (BASAGLAR KWIKPEN) 100 UNIT/ML injection pen; Inject 65 Units into the skin Daily with supper  Encourage patient to increase his Basaglar by 2 units every other night. Goal blood sugar is  but he wants to arise there over weeks not days. Encouraged him to schedule visit with [facility] late January or early February. Patient agrees to schedule in late January. He will keep his appointment for a later date also. Microalbuminuria due to type 2 diabetes mellitus (HCC)  -     insulin glargine (BASAGLAR KWIKPEN) 100 UNIT/ML injection pen; Inject 65 Units into the skin Daily with supper  Reminded the patient that problems associated with uncontrolled diabetes are blindness, kidney failure, right attacks, strokes, amputations.

## 2020-01-14 ENCOUNTER — TELEPHONE (OUTPATIENT)
Dept: FAMILY MEDICINE CLINIC | Age: 60
End: 2020-01-14

## 2020-01-23 ENCOUNTER — TELEPHONE (OUTPATIENT)
Dept: FAMILY MEDICINE CLINIC | Age: 60
End: 2020-01-23

## 2020-02-24 RX ORDER — INSULIN GLARGINE 100 [IU]/ML
INJECTION, SOLUTION SUBCUTANEOUS
Qty: 20 ML | Refills: 0 | Status: SHIPPED | OUTPATIENT
Start: 2020-02-24 | End: 2020-03-24 | Stop reason: SDUPTHER

## 2020-03-23 ENCOUNTER — TELEPHONE (OUTPATIENT)
Dept: FAMILY MEDICINE CLINIC | Age: 60
End: 2020-03-23

## 2020-03-24 ENCOUNTER — OFFICE VISIT (OUTPATIENT)
Dept: FAMILY MEDICINE CLINIC | Age: 60
End: 2020-03-24
Payer: COMMERCIAL

## 2020-03-24 VITALS
WEIGHT: 276.02 LBS | SYSTOLIC BLOOD PRESSURE: 130 MMHG | TEMPERATURE: 98.2 F | BODY MASS INDEX: 38.64 KG/M2 | DIASTOLIC BLOOD PRESSURE: 82 MMHG | HEIGHT: 71 IN | RESPIRATION RATE: 16 BRPM | HEART RATE: 74 BPM

## 2020-03-24 LAB
CREATININE URINE POCT: ABNORMAL
HBA1C MFR BLD: 10.9 %
MICROALBUMIN/CREAT 24H UR: ABNORMAL MG/G{CREAT}
MICROALBUMIN/CREAT UR-RTO: ABNORMAL

## 2020-03-24 PROCEDURE — 83036 HEMOGLOBIN GLYCOSYLATED A1C: CPT | Performed by: NURSE PRACTITIONER

## 2020-03-24 PROCEDURE — 82044 UR ALBUMIN SEMIQUANTITATIVE: CPT | Performed by: NURSE PRACTITIONER

## 2020-03-24 PROCEDURE — 99213 OFFICE O/P EST LOW 20 MIN: CPT | Performed by: NURSE PRACTITIONER

## 2020-03-24 RX ORDER — GLIPIZIDE 10 MG/1
TABLET, FILM COATED, EXTENDED RELEASE ORAL
Qty: 180 TABLET | Refills: 0 | Status: SHIPPED | OUTPATIENT
Start: 2020-03-24 | End: 2020-11-13 | Stop reason: SDUPTHER

## 2020-03-24 RX ORDER — METFORMIN HYDROCHLORIDE 500 MG/1
1000 TABLET, EXTENDED RELEASE ORAL 2 TIMES DAILY WITH MEALS
Qty: 360 TABLET | Refills: 0 | Status: SHIPPED | OUTPATIENT
Start: 2020-03-24 | End: 2021-02-17 | Stop reason: ALTCHOICE

## 2020-03-24 RX ORDER — INSULIN GLARGINE 100 [IU]/ML
INJECTION, SOLUTION SUBCUTANEOUS
Qty: 20 ML | Refills: 3 | Status: SHIPPED | OUTPATIENT
Start: 2020-03-24 | End: 2020-11-13 | Stop reason: SDUPTHER

## 2020-03-24 ASSESSMENT — PATIENT HEALTH QUESTIONNAIRE - PHQ9
SUM OF ALL RESPONSES TO PHQ QUESTIONS 1-9: 0
SUM OF ALL RESPONSES TO PHQ QUESTIONS 1-9: 0
SUM OF ALL RESPONSES TO PHQ9 QUESTIONS 1 & 2: 0
1. LITTLE INTEREST OR PLEASURE IN DOING THINGS: 0
2. FEELING DOWN, DEPRESSED OR HOPELESS: 0

## 2020-03-24 NOTE — PATIENT INSTRUCTIONS
juice; 1 cup of melon or raspberries; or 2 tablespoons of dried fruit. ? Milk and no-sugar-added yogurt have 15 grams of carbs in a serving. A serving is 1 cup of milk or 2/3 cup of no-sugar-added yogurt. ? Starchy vegetables have 15 grams of carbs in a serving. A serving is ½ cup of mashed potatoes or sweet potato; 1 cup winter squash; ½ of a small baked potato; ½ cup of cooked beans; or ½ cup cooked corn or green peas. · Learn how much carbs to eat each day and at each meal. A dietitian or CDE can teach you how to keep track of the amount of carbs you eat. This is called carbohydrate counting. · If you are not sure how to count carbohydrate grams, use the Plate Method to plan meals. It is a good, quick way to make sure that you have a balanced meal. It also helps you spread carbs throughout the day. ? Divide your plate by types of foods. Put non-starchy vegetables on half the plate, meat or other protein food on one-quarter of the plate, and a grain or starchy vegetable in the final quarter of the plate. To this you can add a small piece of fruit and 1 cup of milk or yogurt, depending on how many carbs you are supposed to eat at a meal.  · Try to eat about the same amount of carbs at each meal. Do not \"save up\" your daily allowance of carbs to eat at one meal.  · Proteins have very little or no carbs per serving. Examples of proteins are beef, chicken, turkey, fish, eggs, tofu, cheese, cottage cheese, and peanut butter. A serving size of meat is 3 ounces, which is about the size of a deck of cards. Examples of meat substitute serving sizes (equal to 1 ounce of meat) are 1/4 cup of cottage cheese, 1 egg, 1 tablespoon of peanut butter, and ½ cup of tofu. How can you eat out and still eat healthy? · Learn to estimate the serving sizes of foods that have carbohydrate. If you measure food at home, it will be easier to estimate the amount in a serving of restaurant food.   · If the meal you order has too much Education        Learning About Meal Planning for Diabetes  Why plan your meals? Meal planning can be a key part of managing diabetes. Planning meals and snacks with the right balance of carbohydrate, protein, and fat can help you keep your blood sugar at the target level you set with your doctor. You don't have to eat special foods. You can eat what your family eats, including sweets once in a while. But you do have to pay attention to how often you eat and how much you eat of certain foods. You may want to work with a dietitian or a certified diabetes educator. He or she can give you tips and meal ideas and can answer your questions about meal planning. This health professional can also help you reach a healthy weight if that is one of your goals. What plan is right for you? Your dietitian or diabetes educator may suggest that you start with the plate format or carbohydrate counting. The plate format  The plate format is a simple way to help you manage how you eat. You plan meals by learning how much space each food should take on a plate. Using the plate format helps you spread carbohydrate throughout the day. It can make it easier to keep your blood sugar level within your target range. It also helps you see if you're eating healthy portion sizes. To use the plate format, you put non-starchy vegetables on half your plate. Add meat or meat substitutes on one-quarter of the plate. Put a grain or starchy vegetable (such as brown rice or a potato) on the final quarter of the plate. You can add a small piece of fruit and some low-fat or fat-free milk or yogurt, depending on your carbohydrate goal for each meal.  Here are some tips for using the plate format:  · Make sure that you are not using an oversized plate. A 9-inch plate is best. Many restaurants use larger plates. · Get used to using the plate format at home. Then you can use it when you eat out.   · Write down your questions about using the plate format. Talk to your doctor, a dietitian, or a diabetes educator about your concerns. Carbohydrate counting  With carbohydrate counting, you plan meals based on the amount of carbohydrate in each food. Carbohydrate raises blood sugar higher and more quickly than any other nutrient. It is found in desserts, breads and cereals, and fruit. It's also found in starchy vegetables such as potatoes and corn, grains such as rice and pasta, and milk and yogurt. Spreading carbohydrate throughout the day helps keep your blood sugar levels within your target range. Your daily amount depends on several things, including your weight, how active you are, which diabetes medicines you take, and what your goals are for your blood sugar levels. A registered dietitian or diabetes educator can help you plan how much carbohydrate to include in each meal and snack. A guideline for your daily amount of carbohydrate is:  · 45 to 60 grams at each meal. That's about the same as 3 to 4 carbohydrate servings. · 15 to 20 grams at each snack. That's about the same as 1 carbohydrate serving. The Nutrition Facts label on packaged foods tells you how much carbohydrate is in a serving of the food. First, look at the serving size on the food label. Is that the amount you eat in a serving? All of the nutrition information on a food label is based on that serving size. So if you eat more or less than that, you'll need to adjust the other numbers. Total carbohydrate is the next thing you need to look for on the label. If you count carbohydrate servings, one serving of carbohydrate is 15 grams. For foods that don't come with labels, such as fresh fruits and vegetables, you'll need a guide that lists carbohydrate in these foods. Ask your doctor, dietitian, or diabetes educator about books or other nutrition guides you can use.   If you take insulin, you need to know how many grams of carbohydrate are in a meal. This lets you know how much rapid-acting insulin to take before you eat. If you use an insulin pump, you get a constant rate of insulin during the day. So the pump must be programmed at meals to give you extra insulin to cover the rise in blood sugar after meals. When you know how much carbohydrate you will eat, you can take the right amount of insulin. Or, if you always use the same amount of insulin, you need to make sure that you eat the same amount of carbohydrate at meals. If you need more help to understand carbohydrate counting and food labels, ask your doctor, dietitian, or diabetes educator. How do you get started with meal planning? Here are some tips to get started:  · Plan your meals a week at a time. Don't forget to include snacks too. · Use cookbooks or online recipes to plan several main meals. Plan some quick meals for busy nights. You also can double some recipes that freeze well. Then you can save half for other busy nights when you don't have time to cook. · Make sure you have the ingredients you need for your recipes. If you're running low on basic items, put these items on your shopping list too. · List foods that you use to make breakfasts, lunches, and snacks. List plenty of fruits and vegetables. · Post this list on the refrigerator. Add to it as you think of more things you need. · Take the list to the store to do your weekly shopping. Follow-up care is a key part of your treatment and safety. Be sure to make and go to all appointments, and call your doctor if you are having problems. It's also a good idea to know your test results and keep a list of the medicines you take. Where can you learn more? Go to https://reji.[employer]. org and sign in to your [employer] account. Enter D440 in the [employer] box to learn more about \"Learning About Meal Planning for Diabetes. \"     If you do not have an account, please click on the \"Sign Up Now\" link.   Current as of: December 19, 2019Content Version:

## 2020-03-24 NOTE — PROGRESS NOTES
PICK WHEN READY Yes Myron Lawton APRN - CNP   metFORMIN (GLUCOPHAGE) 1000 MG tablet Take 1 tablet by mouth 2 times daily (with meals) Yes Kaitlin Serrano DO   glipiZIDE (GLUCOTROL XL) 10 MG extended release tablet One po bid Yes Kaitlin Serrano DO   atorvastatin (LIPITOR) 20 MG tablet Take 1 tablet by mouth nightly To lower cholesterol Yes Kaitlin Serrano DO   lisinopril-hydrochlorothiazide (PRINZIDE;ZESTORETIC) 20-12.5 MG per tablet TAKE TWO TABLETS BY MOUTH IN THE MORNING FOR HTN Yes Kaitlin Serrano DO   Insulin Syringe-Needle U-100 ([employer] INSULIN SYRINGE) 31G X \" 1 ML MISC 1 each by Does not apply route daily Yes Lorena Pryor DO   aspirin (ANACIN) 81 MG EC tablet Take 81 mg by mouth daily. Yes Historical Provider, MD        Social History     Tobacco Use    Smoking status: Former Smoker     Packs/day: 1.00     Years: 5.00     Pack years: 5.00     Types: Cigars     Last attempt to quit: 2014     Years since quittin.5    Smokeless tobacco: Never Used    Tobacco comment: total smoking cigars 10 years   Substance Use Topics    Alcohol use: Not Currently     Alcohol/week: 0.8 - 1.7 standard drinks     Types: 1 - 2 Standard drinks or equivalent per week        Vitals:    20 1336   BP: 130/82   Site: Right Upper Arm   Position: Sitting   Cuff Size: Large Adult   Pulse: 74   Resp: 16   Temp: 98.2 °F (36.8 °C)   TempSrc: Oral   Weight: 276 lb 0.3 oz (125.2 kg)   Height: 5' 11\" (1.803 m)     Estimated body mass index is 38.5 kg/m² as calculated from the following:    Height as of this encounter: 5' 11\" (1.803 m). Weight as of this encounter: 276 lb 0.3 oz (125.2 kg). Physical Exam  Vitals signs reviewed. Constitutional:       Appearance: Normal appearance. He is well-developed, well-groomed and normal weight. He is not ill-appearing, toxic-appearing or diaphoretic. Cardiovascular:      Rate and Rhythm: Normal rate and regular rhythm.       Heart sounds: visit:    Type 2 diabetes mellitus with hyperglycemia, with long-term current use of insulin (HCC)  -     POCT microalbumin  -     POCT glycosylated hemoglobin (Hb A1C)  -     insulin glargine (BASAGLAR KWIKPEN) 100 UNIT/ML injection pen; INJECT 65 UNITS SUBCUTANEOUSLY ONCE DAILY WITH SUPPER    Uncontrolled type 2 diabetes mellitus with hyperglycemia (HCC)  -     dapagliflozin (FARXIGA) 10 MG tablet; Take 1 tablet by mouth every morning  -     COMPREHENSIVE METABOLIC PANEL; Future    Microalbuminuria due to type 2 diabetes mellitus (HCC)  -     insulin glargine (BASAGLAR KWIKPEN) 100 UNIT/ML injection pen; INJECT 65 UNITS SUBCUTANEOUSLY ONCE DAILY WITH SUPPER    Encounter for screening for HIV  -     HIV-1 AND HIV-2 ANTIBODIES;  Future    Need for hepatitis C screening test

## 2020-11-09 ENCOUNTER — TELEPHONE (OUTPATIENT)
Dept: FAMILY MEDICINE CLINIC | Age: 60
End: 2020-11-09

## 2020-11-09 NOTE — TELEPHONE ENCOUNTER
PT @  [phone]    HE IS NEEDING A DIABETIC F/U APPT - HIS KNEE AND FOOT ARE SWOLLEN - WHEN CAN YOU WORK HIM IN?     HE SAID AT YOUR CONVENIENCE    PLEASE CALL

## 2020-11-13 ENCOUNTER — OFFICE VISIT (OUTPATIENT)
Dept: FAMILY MEDICINE CLINIC | Age: 60
End: 2020-11-13
Payer: COMMERCIAL

## 2020-11-13 VITALS
HEIGHT: 71 IN | TEMPERATURE: 97.6 F | DIASTOLIC BLOOD PRESSURE: 70 MMHG | OXYGEN SATURATION: 96 % | WEIGHT: 269 LBS | BODY MASS INDEX: 37.66 KG/M2 | SYSTOLIC BLOOD PRESSURE: 130 MMHG | HEART RATE: 84 BPM

## 2020-11-13 LAB — HBA1C MFR BLD: 13.1 %

## 2020-11-13 PROCEDURE — 99214 OFFICE O/P EST MOD 30 MIN: CPT | Performed by: NURSE PRACTITIONER

## 2020-11-13 PROCEDURE — 90471 IMMUNIZATION ADMIN: CPT | Performed by: NURSE PRACTITIONER

## 2020-11-13 PROCEDURE — 83036 HEMOGLOBIN GLYCOSYLATED A1C: CPT | Performed by: NURSE PRACTITIONER

## 2020-11-13 PROCEDURE — 90688 IIV4 VACCINE SPLT 0.5 ML IM: CPT | Performed by: NURSE PRACTITIONER

## 2020-11-13 RX ORDER — INSULIN GLARGINE 100 [IU]/ML
INJECTION, SOLUTION SUBCUTANEOUS
Qty: 24 ML | Refills: 3 | Status: SHIPPED | OUTPATIENT
Start: 2020-11-13 | End: 2021-02-17 | Stop reason: SDUPTHER

## 2020-11-13 RX ORDER — GLIPIZIDE 10 MG/1
TABLET, FILM COATED, EXTENDED RELEASE ORAL
Qty: 60 TABLET | Refills: 3 | Status: SHIPPED | OUTPATIENT
Start: 2020-11-13 | End: 2021-02-17 | Stop reason: SDUPTHER

## 2020-11-13 RX ORDER — LISINOPRIL AND HYDROCHLOROTHIAZIDE 20; 12.5 MG/1; MG/1
TABLET ORAL
Qty: 60 TABLET | Refills: 5 | Status: SHIPPED | OUTPATIENT
Start: 2020-11-13 | End: 2021-02-17 | Stop reason: SDUPTHER

## 2020-11-13 RX ORDER — SULFAMETHOXAZOLE AND TRIMETHOPRIM 800; 160 MG/1; MG/1
1 TABLET ORAL 2 TIMES DAILY
Qty: 10 TABLET | Refills: 0 | Status: SHIPPED | OUTPATIENT
Start: 2020-11-13 | End: 2020-11-18

## 2020-11-13 RX ORDER — ATORVASTATIN CALCIUM 20 MG/1
20 TABLET, FILM COATED ORAL NIGHTLY
Qty: 30 TABLET | Refills: 5 | Status: SHIPPED | OUTPATIENT
Start: 2020-11-13 | End: 2021-02-17 | Stop reason: SDUPTHER

## 2020-11-13 ASSESSMENT — ENCOUNTER SYMPTOMS
BACK PAIN: 0
CONSTIPATION: 0
ABDOMINAL PAIN: 0
EYE DISCHARGE: 0
CHEST TIGHTNESS: 0
COLOR CHANGE: 0
SINUS PRESSURE: 0
DIARRHEA: 0
SHORTNESS OF BREATH: 0
ABDOMINAL DISTENTION: 0
COUGH: 0
NAUSEA: 0

## 2020-11-13 NOTE — PATIENT INSTRUCTIONS
Call insurance company to discuss coverage for shingles vaccine (Shingrix) 2 dose series     Recommend eye exam for diabetes    Increase lantus to 80 units nightly      Patient Education        Learning About Meal Planning for Diabetes  Why plan your meals? Meal planning can be a key part of managing diabetes. Planning meals and snacks with the right balance of carbohydrate, protein, and fat can help you keep your blood sugar at the target level you set with your doctor. You don't have to eat special foods. You can eat what your family eats, including sweets once in a while. But you do have to pay attention to how often you eat and how much you eat of certain foods. You may want to work with a dietitian or a certified diabetes educator. He or she can give you tips and meal ideas and can answer your questions about meal planning. This health professional can also help you reach a healthy weight if that is one of your goals. What plan is right for you? Your dietitian or diabetes educator may suggest that you start with the plate format or carbohydrate counting. The plate format  The plate format is a simple way to help you manage how you eat. You plan meals by learning how much space each food should take on a plate. Using the plate format helps you spread carbohydrate throughout the day. It can make it easier to keep your blood sugar level within your target range. It also helps you see if you're eating healthy portion sizes. To use the plate format, you put non-starchy vegetables on half your plate. Add meat or meat substitutes on one-quarter of the plate. Put a grain or starchy vegetable (such as brown rice or a potato) on the final quarter of the plate. You can add a small piece of fruit and some low-fat or fat-free milk or yogurt, depending on your carbohydrate goal for each meal.  Here are some tips for using the plate format:  · Make sure that you are not using an oversized plate.  A 9-inch plate is best. Many restaurants use larger plates. · Get used to using the plate format at home. Then you can use it when you eat out. · Write down your questions about using the plate format. Talk to your doctor, a dietitian, or a diabetes educator about your concerns. Carbohydrate counting  With carbohydrate counting, you plan meals based on the amount of carbohydrate in each food. Carbohydrate raises blood sugar higher and more quickly than any other nutrient. It is found in desserts, breads and cereals, and fruit. It's also found in starchy vegetables such as potatoes and corn, grains such as rice and pasta, and milk and yogurt. Spreading carbohydrate throughout the day helps keep your blood sugar levels within your target range. Your daily amount depends on several things, including your weight, how active you are, which diabetes medicines you take, and what your goals are for your blood sugar levels. A registered dietitian or diabetes educator can help you plan how much carbohydrate to include in each meal and snack. A guideline for your daily amount of carbohydrate is:  · 45 to 60 grams at each meal. That's about the same as 3 to 4 carbohydrate servings. · 15 to 20 grams at each snack. That's about the same as 1 carbohydrate serving. The Nutrition Facts label on packaged foods tells you how much carbohydrate is in a serving of the food. First, look at the serving size on the food label. Is that the amount you eat in a serving? All of the nutrition information on a food label is based on that serving size. So if you eat more or less than that, you'll need to adjust the other numbers. Total carbohydrate is the next thing you need to look for on the label. If you count carbohydrate servings, one serving of carbohydrate is 15 grams. For foods that don't come with labels, such as fresh fruits and vegetables, you'll need a guide that lists carbohydrate in these foods.  Ask your doctor, dietitian, or diabetes educator about books or other nutrition guides you can use. If you take insulin, you need to know how many grams of carbohydrate are in a meal. This lets you know how much rapid-acting insulin to take before you eat. If you use an insulin pump, you get a constant rate of insulin during the day. So the pump must be programmed at meals to give you extra insulin to cover the rise in blood sugar after meals. When you know how much carbohydrate you will eat, you can take the right amount of insulin. Or, if you always use the same amount of insulin, you need to make sure that you eat the same amount of carbohydrate at meals. If you need more help to understand carbohydrate counting and food labels, ask your doctor, dietitian, or diabetes educator. How do you get started with meal planning? Here are some tips to get started:  · Plan your meals a week at a time. Don't forget to include snacks too. · Use cookbooks or online recipes to plan several main meals. Plan some quick meals for busy nights. You also can double some recipes that freeze well. Then you can save half for other busy nights when you don't have time to cook. · Make sure you have the ingredients you need for your recipes. If you're running low on basic items, put these items on your shopping list too. · List foods that you use to make breakfasts, lunches, and snacks. List plenty of fruits and vegetables. · Post this list on the refrigerator. Add to it as you think of more things you need. · Take the list to the store to do your weekly shopping. Follow-up care is a key part of your treatment and safety. Be sure to make and go to all appointments, and call your doctor if you are having problems. It's also a good idea to know your test results and keep a list of the medicines you take. Where can you learn more? Go to https://reji.fflap. org and sign in to your [employer] account.  Enter V919 in the [employer] box to learn more about \"Learning About Meal Planning for Diabetes. \"     If you do not have an account, please click on the \"Sign Up Now\" link. Current as of: December 20, 2019               Content Version: 12.6  © 6031-0419 [employer]. Care instructions adapted under license by [facility] ([facility]). If you have questions about a medical condition or this instruction, always ask your healthcare professional. [place] [address] any warranty or liability for your use of this information. Patient Education        Learning About Diabetes Food Guidelines  Your Care Instructions     Meal planning is important to manage diabetes. It helps keep your blood sugar at a target level (which you set with your doctor). You don't have to eat special foods. You can eat what your family eats, including sweets once in a while. But you do have to pay attention to how often you eat and how much you eat of certain foods. You may want to work with a dietitian or a certified diabetes educator (CDE) to help you plan meals and snacks. A dietitian or CDE can also help you lose weight if that is one of your goals. What should you know about eating carbs? Managing the amount of carbohydrate (carbs) you eat is an important part of healthy meals when you have diabetes. Carbohydrate is found in many foods. · Learn which foods have carbs. And learn the amounts of carbs in different foods. ? Bread, cereal, pasta, and rice have about 15 grams of carbs in a serving. A serving is 1 slice of bread (1 ounce), ½ cup of cooked cereal, or 1/3 cup of cooked pasta or rice. ? Fruits have 15 grams of carbs in a serving. A serving is 1 small fresh fruit, such as an apple or orange; ½ of a banana; ½ cup of cooked or canned fruit; ½ cup of fruit juice; 1 cup of melon or raspberries; or 2 tablespoons of dried fruit. ? Milk and no-sugar-added yogurt have 15 grams of carbs in a serving.  A serving is 1 cup of milk or 2/3 cup of no-sugar-added yogurt. ? Starchy vegetables have 15 grams of carbs in a serving. A serving is ½ cup of mashed potatoes or sweet potato; 1 cup winter squash; ½ of a small baked potato; ½ cup of cooked beans; or ½ cup cooked corn or green peas. · Learn how much carbs to eat each day and at each meal. A dietitian or CDE can teach you how to keep track of the amount of carbs you eat. This is called carbohydrate counting. · If you are not sure how to count carbohydrate grams, use the Plate Method to plan meals. It is a good, quick way to make sure that you have a balanced meal. It also helps you spread carbs throughout the day. ? Divide your plate by types of foods. Put non-starchy vegetables on half the plate, meat or other protein food on one-quarter of the plate, and a grain or starchy vegetable in the final quarter of the plate. To this you can add a small piece of fruit and 1 cup of milk or yogurt, depending on how many carbs you are supposed to eat at a meal.  · Try to eat about the same amount of carbs at each meal. Do not \"save up\" your daily allowance of carbs to eat at one meal.  · Proteins have very little or no carbs per serving. Examples of proteins are beef, chicken, turkey, fish, eggs, tofu, cheese, cottage cheese, and peanut butter. A serving size of meat is 3 ounces, which is about the size of a deck of cards. Examples of meat substitute serving sizes (equal to 1 ounce of meat) are 1/4 cup of cottage cheese, 1 egg, 1 tablespoon of peanut butter, and ½ cup of tofu. How can you eat out and still eat healthy? · Learn to estimate the serving sizes of foods that have carbohydrate. If you measure food at home, it will be easier to estimate the amount in a serving of restaurant food. · If the meal you order has too much carbohydrate (such as potatoes, corn, or baked beans), ask to have a low-carbohydrate food instead. Ask for a salad or green vegetables.   · If you use insulin, check your blood sugar before and after eating out to help you plan how much to eat in the future. · If you eat more carbohydrate at a meal than you had planned, take a walk or do other exercise. This will help lower your blood sugar. What else should you know? · Limit saturated fat, such as the fat from meat and dairy products. This is a healthy choice because people who have diabetes are at higher risk of heart disease. So choose lean cuts of meat and nonfat or low-fat dairy products. Use olive or canola oil instead of butter or shortening when cooking. · Don't skip meals. Your blood sugar may drop too low if you skip meals and take insulin or certain medicines for diabetes. · Check with your doctor before you drink alcohol. Alcohol can cause your blood sugar to drop too low. Alcohol can also cause a bad reaction if you take certain diabetes medicines. Follow-up care is a key part of your treatment and safety. Be sure to make and go to all appointments, and call your doctor if you are having problems. It's also a good idea to know your test results and keep a list of the medicines you take. Where can you learn more? Go to https://[employer].MiTio. org and sign in to your [employer] account. Enter B102 in the [facility] box to learn more about \"Learning About Diabetes Food Guidelines. \"     If you do not have an account, please click on the \"Sign Up Now\" link. Current as of: December 20, 2019               Content Version: 12.6  © 7901-9425 [employer]. Care instructions adapted under license by [facility] ([facility]). If you have questions about a medical condition or this instruction, always ask your healthcare professional. [address] any warranty or liability for your use of this information. Patient Education        Counting Carbohydrates: Care Instructions  Your Care Instructions     You don't have to eat special foods when you have diabetes.  You just have to be careful to eat healthy foods. Carbohydrates (carbs) raise blood sugar higher and quicker than any other nutrient. Carbs are found in desserts, breads and cereals, and fruit. They're also in starchy vegetables. These include potatoes, corn, and grains such as rice and pasta. Carbs are also in milk and yogurt. The more carbs you eat at one time, the higher your blood sugar will rise. Spreading carbs all through the day helps keep your blood sugar levels within your target range. Counting carbs is one of the best ways to keep your blood sugar under control. If you use insulin, counting carbs helps you match the right amount of insulin to the number of grams of carbs in a meal. Then you can change your diet and insulin dose as needed. Testing your blood sugar several times a day can help you learn how carbs affect your blood sugar. A registered dietitian or certified diabetes educator can help you plan meals and snacks. Follow-up care is a key part of your treatment and safety. Be sure to make and go to all appointments, and call your doctor if you are having problems. It's also a good idea to know your test results and keep a list of the medicines you take. How can you care for yourself at home? Know your daily amount of carbohydrates  Your daily amount depends on several things, such as your weight, how active you are, which diabetes medicines you take, and what your goals are for your blood sugar levels. A registered dietitian or certified diabetes educator can help you plan how many carbs to include in each meal and snack. For most adults, a guideline for the daily amount of carbs is:  · 45 to 60 grams at each meal. That's about the same as 3 to 4 carbohydrate servings. · 15 to 20 grams at each snack. That's about the same as 1 carbohydrate serving. Count carbs  Counting carbs lets you know how much rapid-acting insulin to take before you eat. If you use an insulin pump, you get a constant rate of insulin during the day. The [employer] account. Enter C694 in the [facility] box to learn more about \"Counting Carbohydrates: Care Instructions. \"     If you do not have an account, please click on the \"Sign Up Now\" link. Current as of: December 20, 2019               Content Version: 12.6  © 3391-2307 [employer]. Care instructions adapted under license by [facility] ([facility]). If you have questions about a medical condition or this instruction, always ask your healthcare professional. [place] [address] any warranty or liability for your use of this information. Patient Education        Diabetes Foot Health: Care Instructions  Your Care Instructions     When you have diabetes, your feet need extra care and attention. Diabetes can damage the nerve endings and blood vessels in your feet, making you less likely to notice when your feet are injured. Diabetes also limits your body's ability to fight infection and get blood to areas that need it. If you get a minor foot injury, it could become an ulcer or a serious infection. With good foot care, you can prevent most of these problems. Caring for your feet can be quick and easy. Most of the care can be done when you are bathing or getting ready for bed. Follow-up care is a key part of your treatment and safety. Be sure to make and go to all appointments, and call your doctor if you are having problems. It's also a good idea to know your test results and keep a list of the medicines you take. How can you care for yourself at home? · Keep your blood sugar close to normal by watching what and how much you eat, monitoring blood sugar, taking medicines if prescribed, and getting regular exercise. · Do not smoke. Smoking affects blood flow and can make foot problems worse. If you need help quitting, talk to your doctor about stop-smoking programs and medicines. These can increase your chances of quitting for good. · Eat a diet that is low in fats.  High fat intake can cause fat to build up in your blood vessels and decrease blood flow. · Inspect your feet daily for blisters, cuts, cracks, or sores. If you cannot see well, use a mirror or have someone help you. · Take care of your feet:  ? Wash your feet every day. Use warm (not hot) water. Check the water temperature with your wrists or other part of your body, not your feet. ? Dry your feet well. Pat them dry. Do not rub the skin on your feet too hard. Dry well between your toes. If the skin on your feet stays moist, bacteria or a fungus can grow, which can lead to infection. ? Keep your skin soft. Use moisturizing skin cream to keep the skin on your feet soft and prevent calluses and cracks. But do not put the cream between your toes, and stop using any cream that causes a rash. ? Clean underneath your toenails carefully. Do not use a sharp object to clean underneath your toenails. Use the blunt end of a nail file or other rounded tool. ? Trim and file your toenails straight across to prevent ingrown toenails. Use a nail clipper, not scissors. Use an emery board to smooth the edges. · Change socks daily. Socks without seams are best, because seams often rub the feet. You can find socks for people with diabetes from specialty catalogs. · Look inside your shoes every day for things like gravel or torn linings, which could cause blisters or sores. · Buy shoes that fit well:  ? Look for shoes that have plenty of space around the toes. This helps prevent bunions and blisters. ? Try on shoes while wearing the kind of socks you will usually wear with the shoes. ? Avoid plastic shoes. They may rub your feet and cause blisters. Good shoes should be made of materials that are flexible and breathable, such as leather or cloth. ? Break in new shoes slowly by wearing them for no more than an hour a day for several days.  Take extra time to check your feet for red areas, blisters, or other problems after you wear professional. [address] any warranty or liability for your use of this information.

## 2020-11-13 NOTE — PROGRESS NOTES
Date of Service:  2020    Marika Loaiza (:  1960) is a 63 y.o. male, here for evaluation of the following medical concerns:    Chief Complaint   Patient presents with    Diabetes     3 MO FOLLOW UP FOR DM- FEET SWELLING         HPI     Last A1C was 8 months and was 10.9 which was vastly improved from 3 months prior at 14. A1C 13.1 now. Pt currently was on metformin 2000mg, glipizide 20mg, farxiga 10mg, and lantus 65 units with supper but recently was running low and was only taking 20 units x 3 months to try and make it last longer to get to an appt. Treatment Adherence:   Medication compliance:  noncompliant: running out  Diet compliance:  noncompliant: half the time  Weight trend: stable  Current exercise: no regular exercise and issues with balance  Barriers: physical barrier, lack of balance    Diabetes Mellitus Type 2: Current symptoms/problems include foot ulcerations and neuropathy. Home blood sugar records: fasting range: 350s- when was on lantus 65 units, was down to 180-190  Any episodes of hypoglycemia? no  Eye exam current (within one year): no, last one   Tobacco history: He  reports that he quit smoking about 6 years ago. His smoking use included cigars. He has a 5.00 pack-year smoking history. He has never used smokeless tobacco.   Daily Aspirin? Yes    Hypertension:  Home blood pressure monitoring: No.  He tries to be adherent to a low sodium diet. Patient complains of peripheral edema. Antihypertensive medication side effects: no medication side effects noted. Use of agents associated with hypertension: none. Hyperlipidemia:  No new myalgias or GI upset on atorvastatin (Lipitor).        Lab Results   Component Value Date    LABA1C 13.1 2020    LABA1C 10.9 2020    LABA1C 14.0 2019     Lab Results   Component Value Date    LABMICR 8.40 (H) 2018    CREATININE 0.9 2019     Lab Results   Component Value Date    ALT 24 2018 atorvastatin (LIPITOR) 20 MG tablet Take 1 tablet by mouth nightly To lower cholesterol Yes MECHE Murcia CNP   lisinopril-hydroCHLOROthiazide (PRINZIDE;ZESTORETIC) 20-12.5 MG per tablet TAKE TWO TABLETS BY MOUTH IN THE MORNING FOR HTN Yes MECHE Murcia CNP   sulfamethoxazole-trimethoprim (BACTRIM DS;SEPTRA DS) 800-160 MG per tablet Take 1 tablet by mouth 2 times daily for 5 days Yes MECHE Murcia CNP   metFORMIN (GLUCOPHAGE-XR) 500 MG extended release tablet Take 2 tablets by mouth 2 times daily (with meals) PT WILL PICK WHEN READY Yes MECHE Bustos CNP   Insulin Syringe-Needle U-100 ([employer] INSULIN SYRINGE) 31G X \" 1 ML MISC 1 each by Does not apply route daily Yes Liz Query, DO   aspirin (ANACIN) 81 MG EC tablet Take 81 mg by mouth daily. Yes Historical Provider, MD        Social History     Tobacco Use    Smoking status: Former Smoker     Packs/day: 1.00     Years: 5.00     Pack years: 5.00     Types: Cigars     Last attempt to quit: 2014     Years since quittin.1    Smokeless tobacco: Never Used    Tobacco comment: total smoking cigars 10 years   Substance Use Topics    Alcohol use: Not Currently     Alcohol/week: 0.8 - 1.7 standard drinks     Types: 1 - 2 Standard drinks or equivalent per week        Vitals:    20 1622   BP: 130/70   Site: Right Upper Arm   Position: Sitting   Cuff Size: Large Adult   Pulse: 84   Temp: 97.6 °F (36.4 °C)   TempSrc: Infrared   SpO2: 96%   Weight: 269 lb (122 kg)   Height: 5' 11\" (1.803 m)     Estimated body mass index is 37.52 kg/m² as calculated from the following:    Height as of this encounter: 5' 11\" (1.803 m). Weight as of this encounter: 269 lb (122 kg). Physical Exam  Vitals signs reviewed. Constitutional:       General: He is awake. Appearance: Normal appearance. He is well-developed. He is morbidly obese. He is not ill-appearing or toxic-appearing. HENT:      Head: Normocephalic and atraumatic. Right Ear: Hearing, tympanic membrane, ear canal and external ear normal.      Left Ear: Hearing, tympanic membrane, ear canal and external ear normal.      Nose: Nose normal.      Mouth/Throat:      Mouth: Mucous membranes are moist.      Pharynx: Oropharynx is clear. Eyes:      General: Lids are normal.      Extraocular Movements: Extraocular movements intact. Conjunctiva/sclera: Conjunctivae normal.      Pupils: Pupils are equal, round, and reactive to light. Neck:      Musculoskeletal: Full passive range of motion without pain, normal range of motion and neck supple. Thyroid: No thyromegaly. Vascular: No carotid bruit. Cardiovascular:      Rate and Rhythm: Normal rate. Pulses:           Carotid pulses are 2+ on the right side and 2+ on the left side. Radial pulses are 2+ on the right side and 2+ on the left side. Dorsalis pedis pulses are 1+ on the right side and 1+ on the left side. Posterior tibial pulses are 1+ on the right side and 1+ on the left side. Heart sounds: Normal heart sounds, S1 normal and S2 normal. Heart sounds not distant. No murmur. Pulmonary:      Effort: Pulmonary effort is normal.      Breath sounds: Normal breath sounds. Abdominal:      General: Bowel sounds are normal.      Palpations: Abdomen is soft. Tenderness: There is no abdominal tenderness. Genitourinary:     Comments: Deferred  Musculoskeletal: Normal range of motion. Right lower le+ Pitting Edema present. Left lower leg: No edema. Feet:    Feet:      Right foot:      Protective Sensation: 10 sites tested. 0 sites sensed. Left foot:      Protective Sensation: 10 sites tested. 0 sites sensed. Lymphadenopathy:      Head:      Right side of head: No submental, submandibular, tonsillar, preauricular, posterior auricular or occipital adenopathy.       Left side of head: No submental, submandibular, tonsillar, preauricular, posterior auricular or occipital adenopathy. Cervical: No cervical adenopathy. Right cervical: No superficial, deep or posterior cervical adenopathy. Left cervical: No superficial, deep or posterior cervical adenopathy. Upper Body:      Right upper body: No supraclavicular adenopathy. Left upper body: No supraclavicular adenopathy. Skin:     General: Skin is warm and dry. Capillary Refill: Capillary refill takes less than 2 seconds. Neurological:      General: No focal deficit present. Mental Status: He is alert and oriented to person, place, and time. Mental status is at baseline. Motor: Motor function is intact. No weakness. Coordination: Coordination is intact. Gait: Gait is intact. Psychiatric:         Attention and Perception: Attention and perception normal.         Mood and Affect: Mood and affect normal.         Speech: Speech normal.         Behavior: Behavior normal. Behavior is cooperative. Thought Content: Thought content normal.         Cognition and Memory: Cognition and memory normal.         Judgment: Judgment normal.         ASSESSMENT/PLAN:  1. Type 2 diabetes mellitus with hyperglycemia, with long-term current use of insulin ([facility])  A1C uncontrolled at 13.1  - POCT glycosylated hemoglobin (Hb A1C)  -  DIABETES FOOT EXAM  - glipiZIDE (GLUCOTROL XL) 10 MG extended release tablet; One tab oral twice daily  Dispense: 60 tablet; Refill: 3  - dapagliflozin (FARXIGA) 10 MG tablet; Take 1 tablet by mouth every morning  Dispense: 30 tablet; Refill: 3  - insulin glargine (BASAGLAR KWIKPEN) 100 UNIT/ML injection pen; INJECT 80 UNITS SUBCUTANEOUSLY ONCE DAILY WITH SUPPER  Dispense: 24 mL; Refill: 3  -Increased lantus to 80 units nightly, was on 65 units previously and down to 10.9 but then was decreasing lantus to make last longer  - Comprehensive Metabolic Panel;  Future  - CBC Auto Differential; Future  -Discussed Weight loss, initial goal 10% of body weight  Foot care discussed  Physical activity 150 minutes weekly recommended     2. Microalbuminuria due to type 2 diabetes mellitus (HCC)  - insulin glargine (BASAGLAR KWIKPEN) 100 UNIT/ML injection pen; INJECT 80 UNITS SUBCUTANEOUSLY ONCE DAILY WITH SUPPER  Dispense: 24 mL; Refill: 3- increased from 65 units to 80 units    3. Benign essential HTN  - lisinopril-hydroCHLOROthiazide (PRINZIDE;ZESTORETIC) 20-12.5 MG per tablet; TAKE TWO TABLETS BY MOUTH IN THE MORNING FOR HTN  Dispense: 60 tablet; Refill: 5  - Comprehensive Metabolic Panel; Future  - CBC Auto Differential; Future  -BP well controlled    4. Need for influenza vaccination  Information printed and reviewed  - INFLUENZA, QUADV, 0.5ML, 6 MO AND OLDER, IM, MDV, (Nalini Rivera)    5. Screening for malignant neoplasm of colon  -Pt stool loose at times due to metformin. Complete FIT test if loose, otherwise do Cologuard  -Pt declined colonoscopy  - Cologuard (For External Results Only); Future  - POCT Fecal Immunochemical Test (FIT); Future    6. Encounter for screening for HIV  - HIV-1 AND HIV-2 ANTIBODIES; Future    7. Encounter for hepatitis C screening test for low risk patient  - HEPATITIS C ANTIBODY; Future    8. Diabetic ulcer of right midfoot associated with type 2 diabetes mellitus, unspecified ulcer stage ([facility] [address]  -Stop letting girlfriend puncture foot wound  -Use OTC antibiotic/bacitracin cream  - sulfamethoxazole-trimethoprim (BACTRIM DS;SEPTRA DS) 800-160 MG per tablet; Take 1 tablet by mouth 2 times daily for 5 days  Dispense: 10 tablet; Refill: 0  -Consider podiatry if opens up, discussed concern for neuropathy and foot ulcers, pt verbalized understanding    9. Hypertriglyceridemia  -Need to be fasting for labs, not fasting today  - Lipid Panel;  Future    Care Gaps Addressed  Call insurance company to discuss coverage for shingles vaccine (Shingrix) 2 dose series   Cologuard or FIT test recommended, declined colonoscopy  Recommend eye exam this year    I have reviewed patient's pertinent medical history, relevant laboratory and imaging studies, and past/future health maintenance. Discussed with the patient the importance of adhering to their current medication regimen as directed. Advised the patient that they should continue to work on eating a healthy balanced diet and staying active by exercising within their personal limits. Orders as listed above. Patient was advised to keep future appointments with their respective specialty care team(s). Patient had the opportunity to ask questions, all of which were answered to the best of my ability and with patient satisfaction. Patient understands and is agreeable with the care plan following today's visit. Patient is to schedule an appointment for any new or worsening symptoms. Go to ER for significant shortness of breath, chest pain, or uncontrolled pain or fever. I discussed with patient the risk and benefits of any medications that were prescribed today. I verified that the patient understands their medications, labs, and/or procedures. The patient is doing well with current medication regimen and does not have any barriers to adherence. The patient's self-management abilities are good. Return in about 3 months (around 2/13/2021) for Diabetes Follow Up. An electronic signature was used to authenticate this note.     --MECHE Nicole - CNP on 11/13/2020 at 5:45 PM

## 2020-12-10 ENCOUNTER — TELEPHONE (OUTPATIENT)
Dept: FAMILY MEDICINE CLINIC | Age: 60
End: 2020-12-10

## 2020-12-10 NOTE — TELEPHONE ENCOUNTER
CALLED PT AND ADVISED, HE THOUGHT HE MAY HAVE STEPPED ON SOMETHING. STARTED TO LOOK RED MADIHA WAS ABLE TO GET SOME DRAINAGE OUT OF IT AND THEN BECCA PUT HIM ON ANTIBIOTICS. HAS SOME TROUBLE WALKING ON UNEVEN GROUND AND FALLS AT TIMES. THE OTHER DAY HIS BOOTS WHERE VERY HEAVY WITH MUD ON THEM AND HAD TO TAKE THEM OFF BECAUSE HE COULD NOT WALK THEY WERE TOO HEAVY.  SWELLING IS GONE AND NOW LOOKS LIKE A BRUISE

## 2020-12-10 NOTE — TELEPHONE ENCOUNTER
PT HAS HE BELIEVES A BRUISE ON THE BOTTOM OF HIS RIGHT FOOT - L/S 11/13/20 BY BECCA SHE  GAVE HIM SOME ANTIBIOTICS - IT WAS SWELLED UP AND LOOKED INFECTED AT THE TIME     BUT NOW IT LOOKS LIKE A BRUISE -- HE IS DIABETIC - HE ISN'T SURE WHAT IT IS - SHANAE FAULKNER - Ambika ELLIOTT @  [phone]

## 2020-12-10 NOTE — TELEPHONE ENCOUNTER
CALLED AND SPOKE TO PATIENT. HE SAID HE CALLED WOUND CARE AND SINCE THERE IS NO OPEN SORE, THEY COULD NOT SEE HIM. WOUND CARE RECOMMENDED HE SEE A PODIATRIST THEY HAVE THERE IN THEIR BUILDING AND WOUND CARE GAVE HIM THEIR NUMBER. HE CALLED THE PODIATRY OFFICE AND IS CURRENTLY WAITING ON A  CALL BACK FROM THEM.  SC

## 2021-02-17 ENCOUNTER — TELEPHONE (OUTPATIENT)
Dept: FAMILY MEDICINE CLINIC | Age: 61
End: 2021-02-17

## 2021-02-17 ENCOUNTER — OFFICE VISIT (OUTPATIENT)
Dept: FAMILY MEDICINE CLINIC | Age: 61
End: 2021-02-17
Payer: COMMERCIAL

## 2021-02-17 VITALS
HEIGHT: 71 IN | DIASTOLIC BLOOD PRESSURE: 78 MMHG | TEMPERATURE: 97.8 F | BODY MASS INDEX: 37.94 KG/M2 | SYSTOLIC BLOOD PRESSURE: 128 MMHG | WEIGHT: 271 LBS

## 2021-02-17 DIAGNOSIS — E11.29 MICROALBUMINURIA DUE TO TYPE 2 DIABETES MELLITUS (HCC): ICD-10-CM

## 2021-02-17 DIAGNOSIS — Z79.4 TYPE 2 DIABETES MELLITUS WITH HYPERGLYCEMIA, WITH LONG-TERM CURRENT USE OF INSULIN (HCC): ICD-10-CM

## 2021-02-17 DIAGNOSIS — Z79.4 TYPE 2 DIABETES MELLITUS WITHOUT COMPLICATION, WITH LONG-TERM CURRENT USE OF INSULIN (HCC): ICD-10-CM

## 2021-02-17 DIAGNOSIS — I10 BENIGN ESSENTIAL HTN: ICD-10-CM

## 2021-02-17 DIAGNOSIS — R80.9 MICROALBUMINURIA DUE TO TYPE 2 DIABETES MELLITUS (HCC): ICD-10-CM

## 2021-02-17 DIAGNOSIS — E11.9 TYPE 2 DIABETES MELLITUS WITHOUT COMPLICATION, WITH LONG-TERM CURRENT USE OF INSULIN (HCC): ICD-10-CM

## 2021-02-17 DIAGNOSIS — E11.65 TYPE 2 DIABETES MELLITUS WITH HYPERGLYCEMIA, WITH LONG-TERM CURRENT USE OF INSULIN (HCC): ICD-10-CM

## 2021-02-17 LAB — HBA1C MFR BLD: 13.7 %

## 2021-02-17 PROCEDURE — 99214 OFFICE O/P EST MOD 30 MIN: CPT | Performed by: NURSE PRACTITIONER

## 2021-02-17 PROCEDURE — 83036 HEMOGLOBIN GLYCOSYLATED A1C: CPT | Performed by: NURSE PRACTITIONER

## 2021-02-17 RX ORDER — ATORVASTATIN CALCIUM 20 MG/1
20 TABLET, FILM COATED ORAL NIGHTLY
Qty: 30 TABLET | Refills: 5 | Status: SHIPPED | OUTPATIENT
Start: 2021-02-17 | End: 2021-08-30 | Stop reason: SDUPTHER

## 2021-02-17 RX ORDER — GLIPIZIDE 10 MG/1
10 TABLET, FILM COATED, EXTENDED RELEASE ORAL DAILY
Qty: 30 TABLET | Refills: 3 | Status: SHIPPED | OUTPATIENT
Start: 2021-02-17 | End: 2021-03-08

## 2021-02-17 RX ORDER — LISINOPRIL AND HYDROCHLOROTHIAZIDE 20; 12.5 MG/1; MG/1
TABLET ORAL
Qty: 60 TABLET | Refills: 5 | Status: SHIPPED | OUTPATIENT
Start: 2021-02-17 | End: 2021-08-30 | Stop reason: SDUPTHER

## 2021-02-17 RX ORDER — BLOOD SUGAR DIAGNOSTIC
1 STRIP MISCELLANEOUS DAILY
Qty: 100 EACH | Refills: 3 | Status: SHIPPED | OUTPATIENT
Start: 2021-02-17 | End: 2021-08-30 | Stop reason: SDUPTHER

## 2021-02-17 RX ORDER — SEMAGLUTIDE 1.34 MG/ML
INJECTION, SOLUTION SUBCUTANEOUS
Qty: 5 PEN | Refills: 0 | Status: SHIPPED | OUTPATIENT
Start: 2021-02-17 | End: 2021-03-08

## 2021-02-17 RX ORDER — GLIPIZIDE 10 MG/1
TABLET, FILM COATED, EXTENDED RELEASE ORAL
Qty: 60 TABLET | Refills: 2 | Status: SHIPPED | OUTPATIENT
Start: 2021-02-17 | End: 2021-08-30 | Stop reason: SDUPTHER

## 2021-02-17 RX ORDER — SEMAGLUTIDE 1.34 MG/ML
INJECTION, SOLUTION SUBCUTANEOUS
Qty: 5 PEN | Refills: 0 | Status: SHIPPED | OUTPATIENT
Start: 2021-02-17 | End: 2021-02-17 | Stop reason: SDUPTHER

## 2021-02-17 RX ORDER — INSULIN GLARGINE 100 [IU]/ML
INJECTION, SOLUTION SUBCUTANEOUS
Qty: 24 ML | Refills: 3 | Status: SHIPPED | OUTPATIENT
Start: 2021-02-17 | End: 2021-08-30 | Stop reason: SDUPTHER

## 2021-02-17 ASSESSMENT — PATIENT HEALTH QUESTIONNAIRE - PHQ9
SUM OF ALL RESPONSES TO PHQ QUESTIONS 1-9: 0
SUM OF ALL RESPONSES TO PHQ9 QUESTIONS 1 & 2: 0
2. FEELING DOWN, DEPRESSED OR HOPELESS: 0

## 2021-02-17 NOTE — PROGRESS NOTES
Laura Loaiza (:  1960) is a 63 y.o. male,Established patient, here for evaluation of the following chief complaint(s):    Diabetes (3 MO DM ROUTINE FOLLOW UP AIC NEEDED )      SUBJECTIVE/OBJECTIVE:  HPI  PT HAS INCREASED URINARY FREQUENCY  BUMPED BASAGLAR TO 80 UNITS BECAUSE HE RAN OUT OF [facility] DUE TO COST AND GLIPIZIDE TO ONCE DAILY AS HE WAS TRYING TO STRETCH IT OUT  HAVING TROUBLE SEEING AT NIGHT  NO PROBLEM DURING THE DAY BUT IF THERE ARE SHADOWS OR DARK AREAS IN THE HOME HE CANT SEE- LAST EYE EXAM   PRIOR TO COVID DIABETIC RETINOPATHY NOTED PER PT  TRYING TO WATCH HE EATS BUT HIS S.O. IS [place] AND COOKS LOTS OF PASTA  HE IS NOT EXERCISING  HE DOES CHECK HIS BLOOD SUGARS AS MUCH AS HE CAN  190 AT TIMES 350 IS THE HIGHEST     Hypertension: Patient here for follow-up of elevated blood pressure. He is not exercising and is not adherent to low salt diet. He does not check his blood pressure at home. Cardiac symptoms none. Patient denies none. Cardiovascular risk factors: advanced age (older than 54 for men, 72 for women), diabetes mellitus, hypertension, male gender, obesity (BMI >= 30 kg/m2) and sedentary lifestyle. Use of agents associated with hypertension: none. History of target organ damage: none. Review of Systems   Eyes: Positive for visual disturbance. Cardiovascular: Negative. Genitourinary: Positive for difficulty urinating and frequency. Negative for dysuria, enuresis and flank pain. Physical Exam  Vitals signs reviewed. Constitutional:       General: He is awake. He is not in acute distress. Appearance: Normal appearance. He is well-developed. He is morbidly obese. He is not ill-appearing, toxic-appearing or diaphoretic. Cardiovascular:      Rate and Rhythm: Normal rate and regular rhythm. Heart sounds: Normal heart sounds, S1 normal and S2 normal. Heart sounds not distant. No murmur. No systolic murmur. No diastolic murmur. No friction rub. Pulmonary:      Effort: Pulmonary effort is normal.      Breath sounds: Normal breath sounds and air entry. Neurological:      Mental Status: He is alert. Psychiatric:         Attention and Perception: Attention and perception normal.         Mood and Affect: Mood and affect normal.         Speech: Speech normal.         Behavior: Behavior normal. Behavior is cooperative. Thought Content: Thought content normal.         Cognition and Memory: Cognition and memory normal.         ASSESSMENT/PLAN:  1. Type 2 diabetes mellitus with hyperglycemia, with long-term current use of insulin ([facility])  POCT HGA1C 13.6  POOR CONTROL  ADVISED TO DECREASE INTAKE OF SIMPLE CARBS AS WELL AS EXERCISE  CONTINUE glipiZIDE (GLUCOTROL XL) 10 MG extended release tablet; One tab oral twice daily, Disp-60 tablet, R-3 Normal  insulin glargine (BASAGLAR KWIKKPEN) 100 UNIT/ML injection pen; INJECT 80 UNITS SUBCUTANEOUSLY ONCE DAILY WITH SUPPER, Disp-24 mL, -     Insulin Syringe-Needle U-100 ([employer] INSULIN SYRINGE) 31G X 5/16\" 1 ML MISC; DAILY Starting Wed 2/17/2021, Disp-100 each, R-3, NormaR-3Normal  ENCOURAGED TO SCHEDLE AY EYE EXAM  FOLLOW UP IN 91 DAYS  2. Microalbuminuria due to type 2 diabetes mellitus ([address]  CONTINUE PRINZIDE  3. Type 2 diabetes mellitus without complication, with long-term current use of insulin ([facility])  -     Insulin Syringe-Needle U-100 ([employer] INSULIN SYRINGE) 31G X 5/16\" 1 ML MISC; DAILY Starting Wed 2/17/2021, Disp-100 each, R-3, Normal  4. Benign essential HTN  ENCOURAGE PT TO MONITOR B/P AT LEAST ONCE WEEKLY  GOAL /OR LESS-     lisinopril-hydroCHLOROthiazide (PRINZIDE;ZESTORETIC) 20-12.5 MG per tablet; TAKE TWO TABLETS BY MOUTH IN THE MORNING FOR HTN, Disp-60 tablet, R-5Normal      No follow-ups on file. An electronic signature was used to authenticate this note.     --MECHE Andino - CNP

## 2021-02-17 NOTE — PATIENT INSTRUCTIONS

## 2021-02-18 ENCOUNTER — NURSE ONLY (OUTPATIENT)
Dept: FAMILY MEDICINE CLINIC | Age: 61
End: 2021-02-18

## 2021-02-18 DIAGNOSIS — Z79.4 TYPE 2 DIABETES MELLITUS WITH HYPERGLYCEMIA, WITH LONG-TERM CURRENT USE OF INSULIN (HCC): ICD-10-CM

## 2021-02-18 DIAGNOSIS — E11.65 TYPE 2 DIABETES MELLITUS WITH HYPERGLYCEMIA, WITH LONG-TERM CURRENT USE OF INSULIN (HCC): ICD-10-CM

## 2021-02-18 DIAGNOSIS — I10 BENIGN ESSENTIAL HTN: ICD-10-CM

## 2021-02-18 DIAGNOSIS — E78.1 HYPERTRIGLYCERIDEMIA: ICD-10-CM

## 2021-02-18 DIAGNOSIS — Z11.4 ENCOUNTER FOR SCREENING FOR HIV: Primary | ICD-10-CM

## 2021-02-18 DIAGNOSIS — Z11.59 ENCOUNTER FOR HEPATITIS C SCREENING TEST FOR LOW RISK PATIENT: ICD-10-CM

## 2021-02-22 ENCOUNTER — NURSE ONLY (OUTPATIENT)
Dept: FAMILY MEDICINE CLINIC | Age: 61
End: 2021-02-22
Payer: COMMERCIAL

## 2021-02-22 DIAGNOSIS — E78.1 HYPERTRIGLYCERIDEMIA: ICD-10-CM

## 2021-02-22 DIAGNOSIS — E11.65 TYPE 2 DIABETES MELLITUS WITH HYPERGLYCEMIA, WITH LONG-TERM CURRENT USE OF INSULIN (HCC): ICD-10-CM

## 2021-02-22 DIAGNOSIS — I10 BENIGN ESSENTIAL HTN: ICD-10-CM

## 2021-02-22 DIAGNOSIS — Z11.59 ENCOUNTER FOR HEPATITIS C SCREENING TEST FOR LOW RISK PATIENT: ICD-10-CM

## 2021-02-22 DIAGNOSIS — Z11.4 ENCOUNTER FOR SCREENING FOR HIV: ICD-10-CM

## 2021-02-22 DIAGNOSIS — Z79.4 TYPE 2 DIABETES MELLITUS WITH HYPERGLYCEMIA, WITH LONG-TERM CURRENT USE OF INSULIN (HCC): ICD-10-CM

## 2021-02-22 LAB
A/G RATIO: 1.6 (ref 1.1–2.2)
ALBUMIN SERPL-MCNC: 4.1 G/DL (ref 3.4–5)
ALP BLD-CCNC: 62 U/L (ref 40–129)
ALT SERPL-CCNC: 27 U/L (ref 10–40)
ANION GAP SERPL CALCULATED.3IONS-SCNC: 17 MMOL/L (ref 3–16)
AST SERPL-CCNC: 28 U/L (ref 15–37)
BASOPHILS ABSOLUTE: 0 K/UL (ref 0–0.2)
BASOPHILS RELATIVE PERCENT: 0.4 %
BILIRUB SERPL-MCNC: 0.4 MG/DL (ref 0–1)
BUN BLDV-MCNC: 19 MG/DL (ref 7–20)
CALCIUM SERPL-MCNC: 9.7 MG/DL (ref 8.3–10.6)
CHLORIDE BLD-SCNC: 103 MMOL/L (ref 99–110)
CHOLESTEROL, TOTAL: 181 MG/DL (ref 0–199)
CO2: 24 MMOL/L (ref 21–32)
CREAT SERPL-MCNC: 0.9 MG/DL (ref 0.8–1.3)
EOSINOPHILS ABSOLUTE: 0.1 K/UL (ref 0–0.6)
EOSINOPHILS RELATIVE PERCENT: 0.9 %
GFR AFRICAN AMERICAN: >60
GFR NON-AFRICAN AMERICAN: >60
GLOBULIN: 2.6 G/DL
GLUCOSE BLD-MCNC: 135 MG/DL (ref 70–99)
HCT VFR BLD CALC: 42.9 % (ref 40.5–52.5)
HDLC SERPL-MCNC: 47 MG/DL (ref 40–60)
HEMOGLOBIN: 14.2 G/DL (ref 13.5–17.5)
LDL CHOLESTEROL CALCULATED: 82 MG/DL
LYMPHOCYTES ABSOLUTE: 2.5 K/UL (ref 1–5.1)
LYMPHOCYTES RELATIVE PERCENT: 35.8 %
MCH RBC QN AUTO: 26.2 PG (ref 26–34)
MCHC RBC AUTO-ENTMCNC: 33.1 G/DL (ref 31–36)
MCV RBC AUTO: 79.2 FL (ref 80–100)
MONOCYTES ABSOLUTE: 0.7 K/UL (ref 0–1.3)
MONOCYTES RELATIVE PERCENT: 9.3 %
NEUTROPHILS ABSOLUTE: 3.8 K/UL (ref 1.7–7.7)
NEUTROPHILS RELATIVE PERCENT: 53.6 %
PDW BLD-RTO: 13.5 % (ref 12.4–15.4)
PLATELET # BLD: 163 K/UL (ref 135–450)
PMV BLD AUTO: 9.6 FL (ref 5–10.5)
POTASSIUM SERPL-SCNC: 4.3 MMOL/L (ref 3.5–5.1)
RBC # BLD: 5.42 M/UL (ref 4.2–5.9)
SODIUM BLD-SCNC: 144 MMOL/L (ref 136–145)
TOTAL PROTEIN: 6.7 G/DL (ref 6.4–8.2)
TRIGL SERPL-MCNC: 259 MG/DL (ref 0–150)
VLDLC SERPL CALC-MCNC: 52 MG/DL
WBC # BLD: 7.1 K/UL (ref 4–11)

## 2021-02-22 PROCEDURE — 36415 COLL VENOUS BLD VENIPUNCTURE: CPT | Performed by: NURSE PRACTITIONER

## 2021-02-23 LAB
HEPATITIS C ANTIBODY INTERPRETATION: NORMAL
HIV AG/AB: NORMAL
HIV ANTIGEN: NORMAL
HIV-1 ANTIBODY: NORMAL
HIV-2 AB: NORMAL

## 2021-02-23 RX ORDER — DULAGLUTIDE 0.75 MG/.5ML
0.75 INJECTION, SOLUTION SUBCUTANEOUS WEEKLY
Qty: 4 PEN | Refills: 0 | Status: SHIPPED | OUTPATIENT
Start: 2021-02-23 | End: 2021-03-25

## 2021-03-08 ENCOUNTER — OFFICE VISIT (OUTPATIENT)
Dept: FAMILY MEDICINE CLINIC | Age: 61
End: 2021-03-08
Payer: COMMERCIAL

## 2021-03-08 VITALS
HEART RATE: 86 BPM | RESPIRATION RATE: 12 BRPM | DIASTOLIC BLOOD PRESSURE: 70 MMHG | BODY MASS INDEX: 36.96 KG/M2 | WEIGHT: 264 LBS | TEMPERATURE: 97.2 F | HEIGHT: 71 IN | OXYGEN SATURATION: 97 % | SYSTOLIC BLOOD PRESSURE: 140 MMHG

## 2021-03-08 DIAGNOSIS — Z12.11 COLON CANCER SCREENING: ICD-10-CM

## 2021-03-08 DIAGNOSIS — E11.42 TYPE 2 DIABETES MELLITUS WITH DIABETIC POLYNEUROPATHY, WITH LONG-TERM CURRENT USE OF INSULIN (HCC): Primary | ICD-10-CM

## 2021-03-08 DIAGNOSIS — Z79.4 TYPE 2 DIABETES MELLITUS WITH DIABETIC POLYNEUROPATHY, WITH LONG-TERM CURRENT USE OF INSULIN (HCC): Primary | ICD-10-CM

## 2021-03-08 PROCEDURE — 99213 OFFICE O/P EST LOW 20 MIN: CPT | Performed by: NURSE PRACTITIONER

## 2021-03-08 RX ORDER — PREGABALIN 75 MG/1
75 CAPSULE ORAL 2 TIMES DAILY
Qty: 60 CAPSULE | Refills: 1 | Status: SHIPPED | OUTPATIENT
Start: 2021-03-08 | End: 2021-08-30 | Stop reason: SDUPTHER

## 2021-03-08 NOTE — PATIENT INSTRUCTIONS
Patient Education        Diabetic Neuropathy: Care Instructions  Your Care Instructions     When you have diabetes, your blood sugar level may get too high. Over time, high blood sugar levels can damage nerves. This is called diabetic neuropathy. Nerve damage can cause pain, burning, tingling, and numbness and may leave you feeling weak. The feet are often affected. When you have nerve damage in your feet, you cannot feel your feet and toes as well as normal and may not notice cuts or sores. Even a small injury can lead to a serious infection. It is very important that you follow your doctor's advice on foot care. Sometimes diabetes damages nerves that help the body function. If this happens, your blood pressure, sweating, digestion, and urination might be affected. Your doctor may give you a target blood sugar level that is higher or lower than you are used to. Try to keep your blood sugar very close to this target level to prevent more damage. Follow-up care is a key part of your treatment and safety. Be sure to make and go to all appointments, and call your doctor if you are having problems. It's also a good idea to know your test results and keep a list of the medicines you take. How can you care for yourself at home? · Take your medicines exactly as prescribed. Call your doctor if you think you are having a problem with your medicine. It is very important that you take your insulin or diabetes pills as your doctor tells you. · Try to keep blood sugar at your target level. ? Eat a variety of healthy foods, with carbohydrate spread out in your meals. A dietitian can help you plan meals. ? Try to get at least 30 minutes of exercise on most days. ? Check your blood sugar as many times each day as your doctor recommends. · Take and record your blood pressure at home if your doctor tells you to. Learn the importance of the two measures of blood pressure (such as 130 over 80, or 130/80).  To take your blood pressure at home:  ? Ask your doctor to check your blood pressure monitor to be sure it is accurate and the cuff fits you. Also ask your doctor to watch you to make sure that you are using it right. ? Do not use medicine known to raise blood pressure (such as some nasal decongestant sprays) before taking your blood pressure. ? Avoid taking your blood pressure if you have just exercised or are nervous or upset. Rest at least 15 minutes before you take a reading. · Take pain medicines exactly as directed. ? If the doctor gave you a prescription medicine for pain, take it as prescribed. ? If you are not taking a prescription pain medicine, ask your doctor if you can take an over-the-counter medicine. · Do not smoke. Smoking can increase your chance for a heart attack or stroke. If you need help quitting, talk to your doctor about stop-smoking programs and medicines. These can increase your chances of quitting for good. · Limit alcohol to 2 drinks a day for men and 1 drink a day for women. Too much alcohol can cause health problems. · Eat small meals often, rather than 2 or 3 large meals a day. To care for your feet  · Prevent injury by wearing shoes at all times, even when you are indoors. · Do foot care as part of your daily routine. Wash your feet and then rub lotion on your feet, but not between your toes. Use a handheld mirror or magnifying mirror to inspect your feet for blisters, cuts, cracks, or sores. · Have your toenails trimmed and filed straight across. · Wear shoes and socks that fit well. Soft shoes that have good support and that fit well (such as tennis shoes) are best for your feet. · Check your shoes for any loose objects or rough edges before you put them on. · Ask your doctor to check your feet during each visit. Your doctor may notice a foot problem you have missed. · Get early treatment for any foot problem, even a minor one. When should you call for help?    Call your doctor now or seek immediate medical care if:    · You have symptoms of infection, such as:  ? Increased pain, swelling, warmth, or redness. ? Red streaks leading from the area. ? Pus draining from the area. ? A fever.     · You have new or worse numbness, pain, or tingling in any part of your body. Watch closely for changes in your health, and be sure to contact your doctor if:    · You have a new problem with your feet, such as:  ? A new sore or ulcer. ? A break in the skin that is not healing after several days. ? Bleeding corns or calluses. ? An ingrown toenail.     · You do not get better as expected. Where can you learn more? Go to https://OfferWireeb.SpotBanks. org and sign in to your [employer] account. Enter J460 in the Okta box to learn more about \"Diabetic Neuropathy: Care Instructions. \"     If you do not have an account, please click on the \"Sign Up Now\" link. Current as of: December 20, 2019               Content Version: 12.6  © 4096-4514 [employer]. Care instructions adapted under license by [facility] ([facility]). If you have questions about a medical condition or this instruction, always ask your healthcare professional. [address] any warranty or liability for your use of this information. Patient Education        pregabalin  Pronunciation:  pre JEREMIAH a danielle  Brand:  Lyrica, Lyrica CR  What is the most important information I should know about pregabalin? Pregabalin can cause a severe allergic reaction. Stop taking this medicine and seek emergency medical help if you have hives or blisters on your skin, trouble breathing, or swelling in your face, mouth, or throat. Some people have thoughts about suicide while taking pregabalin. Stay alert to changes in your mood or symptoms. Report any new or worsening symptoms to your doctor.   If you have diabetes or heart problems, call your doctor if you have weight gain or swelling in your hands or feet while taking pregabalin. Do not stop using pregabalin suddenly, even if you feel fine. Stopping suddenly may cause withdrawal symptoms. What is pregabalin? Pregabalin is an anti-epileptic drug, also called an anticonvulsant. It works by slowing down impulses in the brain that cause seizures. Pregabalin also affects chemicals in the brain that send pain signals across the nervous system. Pregabalin is used to treat pain caused by fibromyalgia, or nerve pain in people with diabetes (diabetic neuropathy), herpes zoster (post-herpetic neuralgia), or spinal cord injury. Pregabalin is also used with other medications to treat partial onset seizures in adults and children who are at least 2 month old. Pregabalin may also be used for purposes not listed in this medication guide. What should I discuss with my healthcare provider before taking pregabalin? You should not use pregabalin if you are allergic to it. Tell your doctor if you have ever had:  · lung disease, such as chronic obstructive pulmonary disease (COPD); · a mood disorder, depression, or suicidal thoughts;  · heart problems (especially congestive heart failure);  · a bleeding disorder, or low levels of platelets in your blood;  · kidney disease (or if you are on dialysis);  · diabetes (unless you are taking pregabalin to treat diabetic neuropathy);  · drug or alcohol addiction; or  · a severe allergic reaction (angioedema). Do not give this medicine to a child without medical advice. · Pregabalin is not approved for use by anyone younger than 25years old to treat nerve pain caused by fibromyalgia, diabetes, herpes zoster, or spinal cord injury. · Pregabalin is not approved for seizures in anyone younger than 2 month old. Some people have thoughts about suicide while taking pregabalin. Your doctor will need to check your progress at regular visits. Your family or other caregivers should also be alert to changes in your mood or symptoms.   Seizure control is very important during pregnancy, and having a seizure could harm both mother and baby. Do not start or stop taking pregabalin without your doctor's advice, and tell your doctor right away if you become pregnant. If you are pregnant, your name may be listed on a pregnancy registry to track the effects of pregabalin on the baby. Pregabalin can decrease sperm count and may affect fertility in men (your ability to have children). In animal studies, pregabalin also caused birth defects in the offspring of males treated with this medicine. However, it is not known whether these effects would occur in humans. Ask your doctor about your risk. You should not breastfeed while using pregabalin. How should I take pregabalin? Follow all directions on your prescription label and read all medication guides or instruction sheets. Your doctor may occasionally change your dose. Use the medicine exactly as directed. Take the medicine at the same time each day, with or without food. Swallow an extended-release tablet whole and do not crush, chew, or break it. Measure liquid medicine carefully. Use the dosing syringe provided, or use a medicine dose-measuring device (not a kitchen spoon). Call your doctor if your symptoms do not improve, or if they get worse. Do not stop using pregabalin suddenly, even if you feel fine. Stopping suddenly may cause increased seizures or unpleasant withdrawal symptoms. Follow your doctor's instructions about tapering your dose for at least 1 week before stopping completely. In case of emergency, wear or carry medical identification to let others know you take seizure medication. Store at room temperature away from moisture, heat, and light. What happens if I miss a dose? Take the medicine as soon as you can, but skip the missed dose if it is almost time for your next dose. Do not take two doses at one time. What happens if I overdose?   Seek emergency medical attention or call the complete list of side effects and others may occur. Call your doctor for medical advice about side effects. You may report side effects to FDA at 9-978-QQI-7964. What other drugs will affect pregabalin? Using pregabalin with other drugs that slow your breathing can cause dangerous side effects or death. Ask your doctor before using opioid medication, a sleeping pill, cold or allergy medicine, a muscle relaxer, or medicine for anxiety or seizures. Tell your doctor about all your other medicines, especially:  · oral diabetes medicine --pioglitazone, rosiglitazone; or  · an ACE inhibitor --benazepril, captopril, enalapril, fosinopril, lisinopril, moexipril, perindopril, quinapril, ramipril, or trandolapril. This list is not complete. Other drugs may affect pregabalin, including prescription and over-the-counter medicines, vitamins, and herbal products. Not all possible drug interactions are listed here. Where can I get more information? Your pharmacist can provide more information about pregabalin. Remember, keep this and all other medicines out of the reach of children, never share your medicines with others, and use this medication only for the indication prescribed. Every effort has been made to ensure that the information provided by [address] is accurate, up-to-date, and complete, but no guarantee is made to that effect. Drug information contained herein may be time sensitive. [employer] information has been compiled for use by healthcare practitioners and consumers in the [place] and therefore [employer] does not warrant that uses outside of the [place] are appropriate, unless specifically indicated otherwise. [facility] drug information does not endorse drugs, diagnose patients or recommend therapy.  [employer] drug information is an informational resource designed to assist licensed healthcare practitioners in caring for their patients and/or to serve consumers viewing this service as a supplement to, and not a substitute for, the expertise, skill, knowledge and judgment of healthcare practitioners. The absence of a warning for a given drug or drug combination in no way should be construed to indicate that the drug or drug combination is safe, effective or appropriate for any given patient. [facility] does not assume any responsibility for any aspect of healthcare administered with the aid of information [facility] provides. The information contained herein is not intended to cover all possible uses, directions, precautions, warnings, drug interactions, allergic reactions, or adverse effects. If you have questions about the drugs you are taking, check with your doctor, nurse or pharmacist.  Copyright 8204-2781 [address]. Version: 9.01. Revision date: 12/26/2019. Care instructions adapted under license by [facility] ([facility]). If you have questions about a medical condition or this instruction, always ask your healthcare professional. [place] [address] any warranty or liability for your use of this information.

## 2021-03-08 NOTE — PROGRESS NOTES
Abraham Loaiza (:  1960) is a 63 y.o. male,Established patient, here for evaluation of the following chief complaint(s):    Peripheral Neuropathy (GETTING WORSE; BOTH FEET UP TO KNEES AND STARTING IN FINGERTIPS; WEAKENING IN KNES) and Forms (WANTS TO DISCUSS SS/DISABILITY FORMS DUE TO FALLING AT WORK)      SUBJECTIVE/OBJECTIVE:  HPI  HE IS HAVING PROBLEMS AT WORK DUE TO HIS NEUROPATHY WORSE WHEN WALKING ACROSS [place] WORSE IN BAD WEATHER - HE IS THINKING ABOUT GOING ON SS - DISABILITY HE IS NOT SURE WHERE TO GO - NUMBNESS AND TINGLING FROM FEET TO KNEES- HE HAS HAD SOME WEAKNESS AS WELL- HE WAS SEEN AT [facility] IN THE PAST AND WAS TOLD HE HAD DIABETIC RETINOPATHY  Review of Systems   Neurological: Positive for numbness. All other systems reviewed and are negative. Physical Exam  Vitals signs reviewed. Constitutional:       General: He is awake. He is not in acute distress. Appearance: Normal appearance. He is well-developed and well-groomed. He is morbidly obese. He is not ill-appearing, toxic-appearing or diaphoretic. Neurological:      Mental Status: He is alert and oriented to person, place, and time. Psychiatric:         Attention and Perception: Attention and perception normal.         Mood and Affect: Mood and affect normal.         Speech: Speech normal.         Behavior: Behavior normal. Behavior is cooperative. Thought Content: Thought content normal.         Cognition and Memory: Cognition and memory normal.         Judgment: Judgment normal.         Raúl Mcwilliams was seen today for peripheral neuropathy and forms. Diagnoses and all orders for this visit:    Type 2 diabetes mellitus with diabetic polyneuropathy, with long-term current use of insulin ([address]  -     Ambulatory referral to Ophthalmology  -     pregabalin (LYRICA) 75 MG capsule; Take 1 capsule by mouth 2 times daily for 30 days.   Side effects dw pt  Metanx not covered by insurance  Encouraged to

## 2021-03-25 RX ORDER — DULAGLUTIDE 0.75 MG/.5ML
INJECTION, SOLUTION SUBCUTANEOUS
Qty: 4 PEN | Refills: 0 | Status: SHIPPED | OUTPATIENT
Start: 2021-03-25 | End: 2021-04-19

## 2021-04-19 ENCOUNTER — TELEPHONE (OUTPATIENT)
Dept: ADMINISTRATIVE | Age: 61
End: 2021-04-19

## 2021-04-19 RX ORDER — DULAGLUTIDE 0.75 MG/.5ML
INJECTION, SOLUTION SUBCUTANEOUS
Qty: 4 ML | Refills: 1 | Status: SHIPPED | OUTPATIENT
Start: 2021-04-19 | End: 2021-05-27 | Stop reason: ALTCHOICE

## 2021-04-19 NOTE — TELEPHONE ENCOUNTER
Submitted PA for Trulicity 3.73UQ/4.8WK pen-injectors. Key: [id number]. Via CMM STATUS:  PA has been resolved, no additional PA is required. For further inquiries please contact the number on the back of the member prescription card. Will scan letter when received. Please notify patient, thank you.

## 2021-04-19 NOTE — TELEPHONE ENCOUNTER
Spoke to pt and let him know it went through.  I let him know if it was more than $25 that we can send a savings card

## 2021-05-27 ENCOUNTER — OFFICE VISIT (OUTPATIENT)
Dept: FAMILY MEDICINE CLINIC | Age: 61
End: 2021-05-27
Payer: COMMERCIAL

## 2021-05-27 VITALS
TEMPERATURE: 98.4 F | WEIGHT: 269 LBS | OXYGEN SATURATION: 97 % | BODY MASS INDEX: 37.66 KG/M2 | RESPIRATION RATE: 14 BRPM | HEART RATE: 87 BPM | HEIGHT: 71 IN | SYSTOLIC BLOOD PRESSURE: 160 MMHG | DIASTOLIC BLOOD PRESSURE: 90 MMHG

## 2021-05-27 DIAGNOSIS — R42 DIZZINESS: ICD-10-CM

## 2021-05-27 DIAGNOSIS — E11.42 TYPE 2 DIABETES MELLITUS WITH DIABETIC POLYNEUROPATHY, WITH LONG-TERM CURRENT USE OF INSULIN (HCC): Primary | ICD-10-CM

## 2021-05-27 DIAGNOSIS — R29.6 FREQUENT FALLS: ICD-10-CM

## 2021-05-27 DIAGNOSIS — Z23 NEED FOR SHINGLES VACCINE: ICD-10-CM

## 2021-05-27 DIAGNOSIS — R01.1 CARDIAC MURMUR: ICD-10-CM

## 2021-05-27 DIAGNOSIS — R41.3 MEMORY CHANGES: ICD-10-CM

## 2021-05-27 DIAGNOSIS — Z79.4 TYPE 2 DIABETES MELLITUS WITH DIABETIC POLYNEUROPATHY, WITH LONG-TERM CURRENT USE OF INSULIN (HCC): Primary | ICD-10-CM

## 2021-05-27 LAB
CREATININE URINE POCT: 300
HBA1C MFR BLD: 10.1 %
MICROALBUMIN/CREAT 24H UR: 150 MG/G{CREAT}
MICROALBUMIN/CREAT UR-RTO: ABNORMAL

## 2021-05-27 PROCEDURE — 99214 OFFICE O/P EST MOD 30 MIN: CPT | Performed by: NURSE PRACTITIONER

## 2021-05-27 PROCEDURE — 90750 HZV VACC RECOMBINANT IM: CPT | Performed by: NURSE PRACTITIONER

## 2021-05-27 PROCEDURE — 83036 HEMOGLOBIN GLYCOSYLATED A1C: CPT | Performed by: NURSE PRACTITIONER

## 2021-05-27 PROCEDURE — 82044 UR ALBUMIN SEMIQUANTITATIVE: CPT | Performed by: NURSE PRACTITIONER

## 2021-05-27 PROCEDURE — 90471 IMMUNIZATION ADMIN: CPT | Performed by: NURSE PRACTITIONER

## 2021-05-27 RX ORDER — DULAGLUTIDE 1.5 MG/.5ML
1.5 INJECTION, SOLUTION SUBCUTANEOUS WEEKLY
Qty: 5 PEN | Refills: 0 | Status: SHIPPED | OUTPATIENT
Start: 2021-05-27 | End: 2021-07-13

## 2021-05-27 SDOH — ECONOMIC STABILITY: FOOD INSECURITY: WITHIN THE PAST 12 MONTHS, YOU WORRIED THAT YOUR FOOD WOULD RUN OUT BEFORE YOU GOT MONEY TO BUY MORE.: NEVER TRUE

## 2021-05-27 ASSESSMENT — SOCIAL DETERMINANTS OF HEALTH (SDOH): HOW HARD IS IT FOR YOU TO PAY FOR THE VERY BASICS LIKE FOOD, HOUSING, MEDICAL CARE, AND HEATING?: NOT VERY HARD

## 2021-05-27 NOTE — PROGRESS NOTES
enoch Loaiza (:  1960) is a 57 y.o. male,Established patient, here for evaluation of the following chief complaint(s):    Diabetes, Medication Check (C/O RANDOM DIARRHEA FROM MEDICATIONS AND RANDOM DIZZINESS FROM MEDICATIONS), and Hypertension (FEELING DIZZY A LOT MORE; ONLY TAKING 1 TABLET OF LISINOPRIL-HCTZ DAILY )      SUBJECTIVE/OBJECTIVE:  HPI  ROUTINE DIABETES FOLLOW UP  FBS  BUT IT WAS GOING UP DUE TO STRESS HIS GRANDSON WAS ON THE HEART TRANSPLANT LIST BUT  HE  AT FOUR MONTHS AND HE HAS BEEN STRESSED OUT DUE TO THIS-- HE HAS INCREASED THE BASAGLAR UP TO 69 UNITS BUT GOING DOWN NOW WAS IN HIGH 290- NOW IN   . TRULICITY 9.87- XIGDUO -   HAD EYE EXAM LAST YEAR [employer] [facility]     HTN  HE  TAKES THE LISINOPRIL    HIS B/P IS OK  WHEN  HE SITS DOWN IT IS HIGHER NOT SURE OF THE READING  WHEN HE STANDS UP IT GETS A LITTLE LOWER  AGAIN NOT SURE OF NUMBER    Hypertension: Patient here for follow-up of elevated blood pressure. He is not exercising and is adherent to low salt diet. HE DOES NOT CHECK Blood pressure at home. Cardiac symptoms lower extremity edema. Patient denies chest pain, chest pressure/discomfort, claudication, dyspnea, exertional chest pressure/discomfort, fatigue, irregular heart beat, near-syncope, orthopnea, palpitations, paroxysmal nocturnal dyspnea, syncope and tachypnea. Cardiovascular risk factors: advanced age (older than 54 for men, 72 for women), diabetes mellitus, dyslipidemia, hypertension, obesity (BMI >= 30 kg/m2) and sedentary lifestyle. Use of agents associated with hypertension: none. History of target organ damage: none.     HE HAS DIFFICULTY WITH HIS MEMORY    HE WENT TO PT AND COULD NOT COMPLETE THE ACTIVITIES BECAUSE  HE COULD  NOT COMPLETE REPETITIVE ACTIVITIES HIS BRAIN AND HANDS COULD NOT KEEP UP   HE WAS DOING TEST AT HOME TO HELP WITH HIS MEMORY BUT STOPPED BECAUSE HE DID BADLY  HAS HAD HAD SEVERAL FALLS AS WELL - DAYS  Cardiac murmur  Dizziness  -     Echocardiogram complete;  Future        Memory changes  ABNORMAL SLUMS TEST  -     [facility] - Morgan Johnson MD, Neurology, [facility]  -     Ambulatory referral to Neurology    Frequent falls  -     Ghada Florence MD, Neurology, [facility]  -     Ambulatory referral to Neurology    Need for shingles vaccine  -     Zoster recombinant [facility])    PT DECLINED COVID VACCINE  ENCOURAGED TO [address] [place] [address]

## 2021-05-27 NOTE — PATIENT INSTRUCTIONS
Patient Education        Learning About Meal Planning for Diabetes  Why plan your meals? Meal planning can be a key part of managing diabetes. Planning meals and snacks with the right balance of carbohydrate, protein, and fat can help you keep your blood sugar at the target level you set with your doctor. You don't have to eat special foods. You can eat what your family eats, including sweets once in a while. But you do have to pay attention to how often you eat and how much you eat of certain foods. You may want to work with a dietitian or a certified diabetes educator. He or she can give you tips and meal ideas and can answer your questions about meal planning. This health professional can also help you reach a healthy weight if that is one of your goals. What plan is right for you? Your dietitian or diabetes educator may suggest that you start with the plate format or carbohydrate counting. The plate format  The plate format is a simple way to help you manage how you eat. You plan meals by learning how much space each food should take on a plate. Using the plate format helps you spread carbohydrate throughout the day. It can make it easier to keep your blood sugar level within your target range. It also helps you see if you're eating healthy portion sizes. To use the plate format, you put non-starchy vegetables on half your plate. Add meat or meat substitutes on one-quarter of the plate. Put a grain or starchy vegetable (such as brown rice or a potato) on the final quarter of the plate. You can add a small piece of fruit and some low-fat or fat-free milk or yogurt, depending on your carbohydrate goal for each meal.  Here are some tips for using the plate format:  · Make sure that you are not using an oversized plate. A 9-inch plate is best. Many restaurants use larger plates. · Get used to using the plate format at home. Then you can use it when you eat out.   · Write down your questions about using rapid-acting insulin to take before you eat. If you use an insulin pump, you get a constant rate of insulin during the day. So the pump must be programmed at meals to give you extra insulin to cover the rise in blood sugar after meals. When you know how much carbohydrate you will eat, you can take the right amount of insulin. Or, if you always use the same amount of insulin, you need to make sure that you eat the same amount of carbohydrate at meals. If you need more help to understand carbohydrate counting and food labels, ask your doctor, dietitian, or diabetes educator. How can you plan healthy meals? Here are some tips to get started:  · Plan your meals a week at a time. Don't forget to include snacks too. · Use cookbooks or online recipes to plan several main meals. Plan some quick meals for busy nights. You also can double some recipes that freeze well. Then you can save half for other busy nights when you don't have time to cook. · Make sure you have the ingredients you need for your recipes. If you're running low on basic items, put these items on your shopping list too. · List foods that you use to make breakfasts, lunches, and snacks. List plenty of fruits and vegetables. · Post this list on the refrigerator. Add to it as you think of more things you need. · Take the list to the store to do your weekly shopping. Follow-up care is a key part of your treatment and safety. Be sure to make and go to all appointments, and call your doctor if you are having problems. It's also a good idea to know your test results and keep a list of the medicines you take. Where can you learn more? Go to https://chcaseyewza.AWID. org and sign in to your OmniEarth account. Enter E916 in the [employer] box to learn more about \"Learning About Meal Planning for Diabetes. \"     If you do not have an account, please click on the \"Sign Up Now\" link.   Current as of: August 31, 2020               Content Version: 12.8  © 4604-3323 Healthwise, [employer]. Care instructions adapted under license by [facility] ([facility]). If you have questions about a medical condition or this instruction, always ask your healthcare professional. [address] any warranty or liability for your use of this information. Patient Education        Counting Carbohydrates: Care Instructions  Your Care Instructions     You don't have to eat special foods when you have diabetes. You just have to be careful to eat healthy foods. Carbohydrates (carbs) raise blood sugar higher and quicker than any other nutrient. Carbs are found in desserts, breads and cereals, and fruit. They're also in starchy vegetables. These include potatoes, corn, and grains such as rice and pasta. Carbs are also in milk and yogurt. The more carbs you eat at one time, the higher your blood sugar will rise. Spreading carbs all through the day helps keep your blood sugar levels within your target range. Counting carbs is one of the best ways to keep your blood sugar under control. If you use insulin, counting carbs helps you match the right amount of insulin to the number of grams of carbs in a meal. Then you can change your diet and insulin dose as needed. Testing your blood sugar several times a day can help you learn how carbs affect your blood sugar. A registered dietitian or certified diabetes educator can help you plan meals and snacks. Follow-up care is a key part of your treatment and safety. Be sure to make and go to all appointments, and call your doctor if you are having problems. It's also a good idea to know your test results and keep a list of the medicines you take. How can you care for yourself at home? Know your daily amount of carbohydrates  Your daily amount depends on several things, such as your weight, how active you are, which diabetes medicines you take, and what your goals are for your blood sugar levels.  A registered dietitian or certified diabetes educator can help you plan how many carbs to include in each meal and snack. For most adults, a guideline for the daily amount of carbs is:  · 45 to 60 grams at each meal. That's about the same as 3 to 4 carbohydrate servings. · 15 to 20 grams at each snack. That's about the same as 1 carbohydrate serving. Count carbs  Counting carbs lets you know how much rapid-acting insulin to take before you eat. If you use an insulin pump, you get a constant rate of insulin during the day. So the pump must be programmed at meals. This gives you extra insulin to cover the rise in blood sugar after meals. If you take insulin:  · Learn your own insulin-to-carb ratio. You and your diabetes health professional will figure out the ratio. You can do this by testing your blood sugar after meals. For example, you may need a certain amount of insulin for every 15 grams of carbs. · Add up the carb grams in a meal. Then you can figure out how many units of insulin to take based on your insulin-to-carb ratio. · Exercise lowers blood sugar. You can use less insulin than you would if you were not doing exercise. Keep in mind that timing matters. If you exercise within 1 hour after a meal, your body may need less insulin for that meal than it would if you exercised 3 hours after the meal. Test your blood sugar to find out how exercise affects your need for insulin. If you do or don't take insulin:  · Look at labels on packaged foods. This can tell you how many carbs are in a serving. You can also use guides from the American Diabetes Association. · Be aware of portions, or serving sizes. If a package has two servings and you eat the whole package, you need to double the number of grams of carbohydrate listed for one serving. · Protein, fat, and fiber do not raise blood sugar as much as carbs do.  If you eat a lot of these nutrients in a meal, your blood sugar will rise more slowly than it would otherwise. Eat from all food groups  · Eat at least three meals a day. · Plan meals to include food from all the food groups. The food groups include grains, fruits, dairy, proteins, and vegetables. · Talk to your dietitian or diabetes educator about ways to add limited amounts of sweets into your meal plan. · If you drink alcohol, talk to your doctor. It may not be recommended when you are taking certain diabetes medicines. Where can you learn more? Go to https://Velocix.9+. org and sign in to your MomentFeed account. Enter N723 in the [employer] box to learn more about \"Counting Carbohydrates: Care Instructions. \"     If you do not have an account, please click on the \"Sign Up Now\" link. Current as of: August 31, 2020               Content Version: 12.8  © 2006-2021 [employer]. Care instructions adapted under license by [facility] ([facility]). If you have questions about a medical condition or this instruction, always ask your healthcare professional. [place] [address] any warranty or liability for your use of this information. Patient Education        Type 2 Diabetes: Care Instructions  Your Care Instructions     Type 2 diabetes is a disease that develops when the body's tissues cannot use insulin properly. Over time, the pancreas cannot make enough insulin. Insulin is a hormone that helps the body's cells use sugar (glucose) for energy. It also helps the body store extra sugar in muscle, fat, and liver cells. Without insulin, the sugar cannot get into the cells to do its work. It stays in the blood instead. This can cause high blood sugar levels. A person has diabetes when the blood sugar stays too high too much of the time. Over time, diabetes can lead to diseases of the heart, blood vessels, nerves, kidneys, and eyes. You may be able to control your blood sugar by losing weight, eating a healthy diet, and getting daily exercise.  You may also have to take insulin or other diabetes medicine. Follow-up care is a key part of your treatment and safety. Be sure to make and go to all appointments. Call your doctor if you are having problems. It's also a good idea to know your test results and keep a list of the medicines you take. How can you care for yourself at home? · Keep your blood sugar at a target level (which you set with your doctor). ? Carbohydrate--the body's main source of fuel--affects blood sugar more than any other nutrient. Carbohydrate is in fruits, vegetables, milk, and yogurt. It also is in breads, cereals, vegetables such as potatoes and corn, and sugary foods such as candy and cakes. Follow your meal plan to know how much carbohydrate to eat at each meal and snack. ? Aim for 30 minutes of exercise on most, preferably all, days of the week. Walking is a good choice. You also may want to do other activities, such as running, swimming, cycling, or playing tennis or team sports. Try to do muscle-strengthening exercises at least 2 times a week. ? Take your medicines exactly as prescribed. Call your doctor if you think you are having a problem with your medicine. You will get more details on the specific medicines your doctor prescribes. · Check your blood sugar as often as your doctor recommends. It is important to keep track of any symptoms you have, such as low blood sugar. Also tell your doctor if you have any changes in your activities, diet, or insulin use. · Talk to your doctor before you start taking aspirin every day. Aspirin can help certain people lower their risk of a heart attack or stroke. But taking aspirin isn't right for everyone, because it can cause serious bleeding. · Do not smoke. If you need help quitting, talk to your doctor about stop-smoking programs and medicines. These can increase your chances of quitting for good. · Keep your cholesterol and blood pressure at normal levels.  You may need to take one or more medicines to reach your goals. Take them exactly as directed. Do not stop or change a medicine without talking to your doctor first.  When should you call for help? Call 911 anytime you think you may need emergency care. For example, call if:    · You passed out (lost consciousness), or you suddenly become very sleepy or confused. (You may have very low blood sugar.)   Call your doctor now or seek immediate medical care if:    · Your blood sugar is 300 mg/dL or is higher than the level your doctor has set for you.     · You have symptoms of low blood sugar, such as:  ? Sweating. ? Feeling nervous, shaky, and weak. ? Extreme hunger and slight nausea. ? Dizziness and headache.  ? Blurred vision. ? Confusion. Watch closely for changes in your health, and be sure to contact your doctor if:    · You often have problems controlling your blood sugar.     · You have symptoms of long-term diabetes problems, such as:  ? New vision changes. ? New pain, numbness, or tingling in your hands or feet. ? Skin problems. Where can you learn more? Go to https://[place].FamilyFinds. org and sign in to your Cross Current account. Enter C553 in the [employer] box to learn more about \"Type 2 Diabetes: Care Instructions. \"     If you do not have an account, please click on the \"Sign Up Now\" link. Current as of: August 31, 2020               Content Version: 12.8  © 2006-2021 [employer]. Care instructions adapted under license by [facility] ([facility]). If you have questions about a medical condition or this instruction, always ask your healthcare professional. [place] [address] any warranty or liability for your use of this information. Patient Education        Heart Murmur: Care Instructions  Your Care Instructions     A heart murmur is a blowing, whooshing, or rasping sound made by blood moving through the heart or the blood vessels near the heart.  Murmurs can be heard through a stethoscope. Heart murmurs can occur during pregnancy or during a temporary illness, such as a fever. These murmurs usually are not a problem and go away on their own. However, sometimes a heart murmur is a sign of a serious problem, such as congenital heart disease or heart valve problems, that may need treatment. You may need more tests to check your heart. The treatment depends on the specific heart problem causing the murmur. Follow-up care is a key part of your treatment and safety. Be sure to make and go to all appointments, and call your doctor if you are having problems. It's also a good idea to know your test results and keep a list of the medicines you take. How can you care for yourself at home? · Take your medicines exactly as prescribed. Call your doctor if you think you are having a problem with your medicine. You will get more details on the specific medicines your doctor prescribes. · Follow a heart-healthy lifestyle to help keep your heart and body healthy. ? Do not smoke. Smoking increases your risk of heart attack and stroke. If you need help quitting, talk to your doctor about stop-smoking programs and medicines. These can increase your chances of quitting for good. ? Eat heart-healthy foods such as fruits, vegetables, whole grains, fish, lean meats, and low-fat or nonfat dairy foods. Limit sodium, sugars, and alcohol. ? If your doctor recommends it, get more exercise. Walking is a good choice. Bit by bit, increase the amount you walk every day. Try for 30 minutes on most days of the week. You also may want to swim, bike, or do other activities. ? Stay at a healthy weight. Lose weight if you need to. When should you call for help? Call 911 anytime you think you may need emergency care. For example, call if:    · You have severe trouble breathing.     · You cough up pink, foamy mucus and you have trouble breathing.     · You passed out (lost consciousness).     · You have a seizure.   · You have symptoms of a stroke. These may include:  ? Sudden numbness, tingling, weakness, or loss of movement in your face, arm, or leg, especially on only one side of your body. ? Sudden vision changes. ? Sudden trouble speaking. ? Sudden confusion or trouble understanding simple statements. ? Sudden problems with walking or balance. ? A sudden, severe headache that is different from past headaches. Call your doctor now or seek immediate medical care if:    · You have new or increased shortness of breath.     · You feel dizzy or lightheaded, or you feel like you may faint.     · You have sudden weight gain, such as more than 2 to 3 pounds in a day or 5 pounds in a week. (Your doctor may suggest a different range of weight gain.)     · You have increased swelling in your legs or feet.     · You have a fever. Watch closely for changes in your health, and be sure to contact your doctor if you have any problems. Where can you learn more? Go to https://Adea.iJento. org and sign in to your paymio account. Enter Q340 in the [employer] box to learn more about \"Heart Murmur: Care Instructions. \"     If you do not have an account, please click on the \"Sign Up Now\" link. Current as of: August 31, 2020               Content Version: 12.8  © 4364-1498 Healthwise, [employer]. Care instructions adapted under license by [facility] ([facility]). If you have questions about a medical condition or this instruction, always ask your healthcare professional. [place] [address] any warranty or liability for your use of this information.

## 2021-07-13 DIAGNOSIS — E11.42 TYPE 2 DIABETES MELLITUS WITH DIABETIC POLYNEUROPATHY, WITH LONG-TERM CURRENT USE OF INSULIN (HCC): ICD-10-CM

## 2021-07-13 DIAGNOSIS — Z79.4 TYPE 2 DIABETES MELLITUS WITH DIABETIC POLYNEUROPATHY, WITH LONG-TERM CURRENT USE OF INSULIN (HCC): ICD-10-CM

## 2021-07-13 RX ORDER — DULAGLUTIDE 1.5 MG/.5ML
INJECTION, SOLUTION SUBCUTANEOUS
Qty: 4 ML | Refills: 0 | Status: SHIPPED | OUTPATIENT
Start: 2021-07-13 | End: 2021-08-10

## 2021-08-10 DIAGNOSIS — E11.42 TYPE 2 DIABETES MELLITUS WITH DIABETIC POLYNEUROPATHY, WITH LONG-TERM CURRENT USE OF INSULIN (HCC): ICD-10-CM

## 2021-08-10 DIAGNOSIS — Z79.4 TYPE 2 DIABETES MELLITUS WITH DIABETIC POLYNEUROPATHY, WITH LONG-TERM CURRENT USE OF INSULIN (HCC): ICD-10-CM

## 2021-08-10 RX ORDER — DULAGLUTIDE 1.5 MG/.5ML
INJECTION, SOLUTION SUBCUTANEOUS
Qty: 4 ML | Refills: 1 | Status: SHIPPED | OUTPATIENT
Start: 2021-08-10 | End: 2021-08-30 | Stop reason: SDUPTHER

## 2021-08-10 NOTE — TELEPHONE ENCOUNTER
LAST OV WITH CC 5/27/2021  Future Appointments   Date Time Provider Baltazar Nieves   8/30/2021  3:40 PM MECHE Phan - CNP [facility]      KEN CARBONE

## 2021-08-30 ENCOUNTER — OFFICE VISIT (OUTPATIENT)
Dept: FAMILY MEDICINE CLINIC | Age: 61
End: 2021-08-30
Payer: COMMERCIAL

## 2021-08-30 VITALS
HEIGHT: 71 IN | OXYGEN SATURATION: 97 % | SYSTOLIC BLOOD PRESSURE: 150 MMHG | TEMPERATURE: 98.1 F | DIASTOLIC BLOOD PRESSURE: 88 MMHG | WEIGHT: 262 LBS | BODY MASS INDEX: 36.68 KG/M2 | RESPIRATION RATE: 14 BRPM | HEART RATE: 88 BPM

## 2021-08-30 DIAGNOSIS — E78.1 HYPERTRIGLYCERIDEMIA: ICD-10-CM

## 2021-08-30 DIAGNOSIS — I10 BENIGN ESSENTIAL HTN: ICD-10-CM

## 2021-08-30 DIAGNOSIS — Z12.11 COLON CANCER SCREENING: ICD-10-CM

## 2021-08-30 DIAGNOSIS — Z12.5 PROSTATE CANCER SCREENING: ICD-10-CM

## 2021-08-30 DIAGNOSIS — E11.65 TYPE 2 DIABETES MELLITUS WITH HYPERGLYCEMIA, WITH LONG-TERM CURRENT USE OF INSULIN (HCC): ICD-10-CM

## 2021-08-30 DIAGNOSIS — Z79.4 TYPE 2 DIABETES MELLITUS WITH DIABETIC POLYNEUROPATHY, WITH LONG-TERM CURRENT USE OF INSULIN (HCC): Primary | ICD-10-CM

## 2021-08-30 DIAGNOSIS — E11.29 MICROALBUMINURIA DUE TO TYPE 2 DIABETES MELLITUS (HCC): ICD-10-CM

## 2021-08-30 DIAGNOSIS — Z79.4 TYPE 2 DIABETES MELLITUS WITH HYPERGLYCEMIA, WITH LONG-TERM CURRENT USE OF INSULIN (HCC): ICD-10-CM

## 2021-08-30 DIAGNOSIS — R80.9 MICROALBUMINURIA DUE TO TYPE 2 DIABETES MELLITUS (HCC): ICD-10-CM

## 2021-08-30 DIAGNOSIS — E11.42 TYPE 2 DIABETES MELLITUS WITH DIABETIC POLYNEUROPATHY, WITH LONG-TERM CURRENT USE OF INSULIN (HCC): Primary | ICD-10-CM

## 2021-08-30 LAB
HBA1C MFR BLD: 10.5 %
PROSTATE SPECIFIC ANTIGEN: 0.4 NG/ML (ref 0–4)

## 2021-08-30 PROCEDURE — 83036 HEMOGLOBIN GLYCOSYLATED A1C: CPT | Performed by: NURSE PRACTITIONER

## 2021-08-30 PROCEDURE — 99214 OFFICE O/P EST MOD 30 MIN: CPT | Performed by: NURSE PRACTITIONER

## 2021-08-30 PROCEDURE — 36415 COLL VENOUS BLD VENIPUNCTURE: CPT | Performed by: NURSE PRACTITIONER

## 2021-08-30 RX ORDER — GLIPIZIDE 10 MG/1
TABLET, FILM COATED, EXTENDED RELEASE ORAL
Qty: 60 TABLET | Refills: 2 | Status: SHIPPED | OUTPATIENT
Start: 2021-08-30 | End: 2021-11-24 | Stop reason: ALTCHOICE

## 2021-08-30 RX ORDER — PREGABALIN 75 MG/1
75 CAPSULE ORAL 2 TIMES DAILY
Qty: 60 CAPSULE | Refills: 2 | Status: SHIPPED | OUTPATIENT
Start: 2021-08-30 | End: 2021-11-24 | Stop reason: ALTCHOICE

## 2021-08-30 RX ORDER — LISINOPRIL AND HYDROCHLOROTHIAZIDE 20; 12.5 MG/1; MG/1
TABLET ORAL
Qty: 60 TABLET | Refills: 5 | Status: ON HOLD | OUTPATIENT
Start: 2021-08-30 | End: 2022-11-01 | Stop reason: HOSPADM

## 2021-08-30 RX ORDER — ATORVASTATIN CALCIUM 20 MG/1
20 TABLET, FILM COATED ORAL NIGHTLY
Qty: 30 TABLET | Refills: 5 | Status: ON HOLD | OUTPATIENT
Start: 2021-08-30 | End: 2022-11-01 | Stop reason: HOSPADM

## 2021-08-30 RX ORDER — BLOOD SUGAR DIAGNOSTIC
1 STRIP MISCELLANEOUS DAILY
Qty: 100 EACH | Refills: 3 | Status: SHIPPED | OUTPATIENT
Start: 2021-08-30

## 2021-08-30 RX ORDER — INSULIN GLARGINE 100 [IU]/ML
INJECTION, SOLUTION SUBCUTANEOUS
Qty: 24 ML | Refills: 3 | Status: SHIPPED | OUTPATIENT
Start: 2021-08-30 | End: 2021-12-02 | Stop reason: SDUPTHER

## 2021-08-30 RX ORDER — DULAGLUTIDE 1.5 MG/.5ML
INJECTION, SOLUTION SUBCUTANEOUS
Qty: 4 ML | Refills: 1 | Status: SHIPPED | OUTPATIENT
Start: 2021-08-30 | End: 2021-11-15

## 2021-08-30 NOTE — PROGRESS NOTES
Edel Loaiza (:  1960) is a 57 y.o. male,Established patient, here for evaluation of the following chief complaint(s):    Diabetes (FOLLOW UP/ PATIENT HAS NOT BEEN ABLE TO GET MEDICATION X 2 MONTHS DUE TO COST Dapagliflozin-metFORMIN HCl ER  MG TB24 )      SUBJECTIVE/OBJECTIVE:  HPI    ROUTINE FOLLOW UP   HE IS  CHECKING HIS BLOOD SUGARS AT HOME 190- 300  NON FASTING  BASAGLAR 60-80 UNITS DEPENDING ON HIS BLOOD SUGARS  TRULICITY NIGHTLY  HE IS TAKING AN OTC MEDICATION FOR HIS PROSTATE -HE WAS GETTING UP A LOT TO URINATE AT NIGHT  HE TRIES TO WATCH HIS CARB INTAKE IN REGARDS TO CARBS- BIT EATS OUT FOR LUNCH/WORK DAILY  HE IS NOT EXERCISING BECAUSE OF HIS   EYE EXAM 8.21 DIABETIC RETINOPATHY  CATARACTS      HTN    Hypertension: Patient here for follow-up of elevated blood pressure. He is not exercising and is adherent to low salt diet. He does not check his blood pressure  at home. Cardiac symptoms chest pain. Patient denies chest pain. Cardiovascular risk factors: advanced age (older than 54 for men, 72 for women), diabetes mellitus, dyslipidemia, hypertension, male gender, microalbuminuria, obesity (BMI >= 30 kg/m2) and sedentary lifestyle. Use of agents associated with hypertension: none. History of target organ damage: none. Review of Systems   Constitutional: Negative for unexpected weight change. Eyes: Positive for visual disturbance. Cardiovascular: Positive for chest pain. Endocrine: Negative for polydipsia, polyphagia and polyuria. Genitourinary: Positive for frequency. All other systems reviewed and are negative. Physical Exam  Vitals reviewed. Constitutional:       General: He is awake. He is not in acute distress. Appearance: Normal appearance. He is well-developed and well-groomed. He is obese. He is not ill-appearing, toxic-appearing or diaphoretic. Cardiovascular:      Rate and Rhythm: Normal rate and regular rhythm.       Heart sounds: Normal heart sounds, S1 normal and S2 normal. Heart sounds not distant. No murmur heard. No systolic murmur is present. No diastolic murmur is present. No friction rub. No gallop. No S3 or S4 sounds. Pulmonary:      Effort: Pulmonary effort is normal.      Breath sounds: Normal breath sounds and air entry. Musculoskeletal:      Right lower leg: No edema. Left lower leg: No edema. Neurological:      Mental Status: He is alert and oriented to person, place, and time. Psychiatric:         Attention and Perception: Attention and perception normal.         Mood and Affect: Mood and affect normal.         Speech: Speech normal.         Behavior: Behavior normal. Behavior is cooperative. Thought Content: Thought content normal.         Cognition and Memory: Cognition and memory normal.         Judgment: Judgment normal.         Lupe Carrillo was seen today for diabetes. Diagnoses and all orders for this visit:    Type 2 diabetes mellitus with diabetic polyneuropathy, with long-term current use of insulin ([facility])  uncontrolled  -     POCT glycosylated hemoglobin (Hb A1C) 10.5  -     dapagliflozin (FARXIGA) 10 MG tablet; Take 1 tablet by mouth every morning new medication  Continue the following medications  -     insulin glargine (BASAGLAR KWIKPEN) 100 UNIT/ML injection pen; INJECT 80 UNITS SUBCUTANEOUSLY ONCE DAILY WITH SUPPER  -     glipiZIDE (GLUCOTROL XL) 10 MG extended release tablet;  One tab oral twice daily  -     Dulaglutide (TRULICITY) 1.5 BK/4.8XV SOPN; INJECT 1.5 MG SUBCUTANEOUSLY ONCE A WEEK  Encouraged to monitor fasting blood sugar 2 x weekly goal of 110-130  2 hr pp 180 or less  ENCOURAGED TO INCREASE CARDIO ACTIVITY TO AT LEAST  30 MIN3-4 X WEEKLY   Follow up in three months  Type 2 diabetes mellitus with hyperglycemia, with long-term current use of insulin (HCC)  -     insulin glargine (BASAGLAR KWIKPEN) 100 UNIT/ML injection pen; INJECT 80 UNITS SUBCUTANEOUSLY ONCE DAILY WITH SUPPER  - glipiZIDE (GLUCOTROL XL) 10 MG extended release tablet; One tab oral twice daily    Benign essential HTN  -     lisinopril-hydroCHLOROthiazide (PRINZIDE;ZESTORETIC) 20-12.5 MG per tablet; TAKE TWO TABLETS BY MOUTH IN THE MORNING FOR HTN  Lifestyle Recommendations for High Blood Pressure:  Reduce sodium intake to less than 1500 mg daily  Include 5-7 servings of fruits and vegetables daily  Reduce weight to ideal if overweight  Quit smoking . ..  30 minutes of physical activity daily    Microalbuminuria due to type 2 diabetes mellitus (HCC)  -     lisinopril-hydroCHLOROthiazide (PRINZIDE;ZESTORETIC) 20-12.5 MG per tablet; TAKE TWO TABLETS BY MOUTH IN THE MORNING FOR HTN      Hypertriglyceridemia  -     atorvastatin (LIPITOR) 20 MG tablet; Take 1 tablet by mouth nightly To lower cholestero  Prostate cancer screening  -     Psa screening; Future      Colon cancer screening  -     Amb External Referral To Gastroenterology  DR Demar Church              An electronic signature was used to authenticate this note.     --Juma Botello, MECHE - CNP

## 2021-08-30 NOTE — PATIENT INSTRUCTIONS
Patient Education        Learning About Meal Planning for Diabetes  Why plan your meals? Meal planning can be a key part of managing diabetes. Planning meals and snacks with the right balance of carbohydrate, protein, and fat can help you keep your blood sugar at the target level you set with your doctor. You don't have to eat special foods. You can eat what your family eats, including sweets once in a while. But you do have to pay attention to how often you eat and how much you eat of certain foods. You may want to work with a dietitian or a certified diabetes educator. He or she can give you tips and meal ideas and can answer your questions about meal planning. This health professional can also help you reach a healthy weight if that is one of your goals. What plan is right for you? Your dietitian or diabetes educator may suggest that you start with the plate format or carbohydrate counting. The plate format  The plate format is a simple way to help you manage how you eat. You plan meals by learning how much space each food should take on a plate. Using the plate format helps you spread carbohydrate throughout the day. It can make it easier to keep your blood sugar level within your target range. It also helps you see if you're eating healthy portion sizes. To use the plate format, you put non-starchy vegetables on half your plate. Add meat or meat substitutes on one-quarter of the plate. Put a grain or starchy vegetable (such as brown rice or a potato) on the final quarter of the plate. You can add a small piece of fruit and some low-fat or fat-free milk or yogurt, depending on your carbohydrate goal for each meal.  Here are some tips for using the plate format:  · Make sure that you are not using an oversized plate. A 9-inch plate is best. Many restaurants use larger plates. · Get used to using the plate format at home. Then you can use it when you eat out.   · Write down your questions about using the plate format. Talk to your doctor, a dietitian, or a diabetes educator about your concerns. Carbohydrate counting  With carbohydrate counting, you plan meals based on the amount of carbohydrate in each food. Carbohydrate raises blood sugar higher and more quickly than any other nutrient. It is found in desserts, breads and cereals, and fruit. It's also found in starchy vegetables such as potatoes and corn, grains such as rice and pasta, and milk and yogurt. Spreading carbohydrate throughout the day helps keep your blood sugar levels within your target range. Your daily amount depends on several things, including your weight, how active you are, which diabetes medicines you take, and what your goals are for your blood sugar levels. A registered dietitian or diabetes educator can help you plan how much carbohydrate to include in each meal and snack. A guideline for your daily amount of carbohydrate is:  · 45 to 60 grams at each meal. That's about the same as 3 to 4 carbohydrate servings. · 15 to 20 grams at each snack. That's about the same as 1 carbohydrate serving. The Nutrition Facts label on packaged foods tells you how much carbohydrate is in a serving of the food. First, look at the serving size on the food label. Is that the amount you eat in a serving? All of the nutrition information on a food label is based on that serving size. So if you eat more or less than that, you'll need to adjust the other numbers. Total carbohydrate is the next thing you need to look for on the label. If you count carbohydrate servings, one serving of carbohydrate is 15 grams. For foods that don't come with labels, such as fresh fruits and vegetables, you'll need a guide that lists carbohydrate in these foods. Ask your doctor, dietitian, or diabetes educator about books or other nutrition guides you can use.   If you take insulin, you need to know how many grams of carbohydrate are in a meal. This lets you know how much rapid-acting insulin to take before you eat. If you use an insulin pump, you get a constant rate of insulin during the day. So the pump must be programmed at meals to give you extra insulin to cover the rise in blood sugar after meals. When you know how much carbohydrate you will eat, you can take the right amount of insulin. Or, if you always use the same amount of insulin, you need to make sure that you eat the same amount of carbohydrate at meals. If you need more help to understand carbohydrate counting and food labels, ask your doctor, dietitian, or diabetes educator. How can you plan healthy meals? Here are some tips to get started:  · Plan your meals a week at a time. Don't forget to include snacks too. · Use cookbooks or online recipes to plan several main meals. Plan some quick meals for busy nights. You also can double some recipes that freeze well. Then you can save half for other busy nights when you don't have time to cook. · Make sure you have the ingredients you need for your recipes. If you're running low on basic items, put these items on your shopping list too. · List foods that you use to make breakfasts, lunches, and snacks. List plenty of fruits and vegetables. · Post this list on the refrigerator. Add to it as you think of more things you need. · Take the list to the store to do your weekly shopping. Follow-up care is a key part of your treatment and safety. Be sure to make and go to all appointments, and call your doctor if you are having problems. It's also a good idea to know your test results and keep a list of the medicines you take. Where can you learn more? Go to https://reji.Whiskey [employer]. org and sign in to your Tailwind account. Enter Z471 in the [facility] box to learn more about \"Learning About Meal Planning for Diabetes. \"     If you do not have an account, please click on the \"Sign Up Now\" link.   Current as of: August 31, 2020               Content Version: 12.9  © 8610-1960 Healthwise, [employer]. Care instructions adapted under license by [facility] ([facility]). If you have questions about a medical condition or this instruction, always ask your healthcare professional. [address] any warranty or liability for your use of this information. Patient Education        Learning About Diuretics for High Blood Pressure  Overview  Diuretics help to lower blood pressure. This reduces your risk of a heart attack and stroke. It also reduces your risk of kidney disease. Diuretics cause your kidneys to remove sodium and water. They also relax the blood vessel walls. These help lower your blood pressure. Examples  · Chlorthalidone  · Hydrochlorothiazide  Possible side effects  There are some common side effects. They are:  · Too little potassium. · Feeling dizzy. · Rash. · Urinating a lot. · High blood sugar. (But this is not common.)  You may have other side effects. Check the information that comes with your medicine. What to know about taking this medicine  · You may take other medicines for blood pressure. Diuretics can help those work better. They can also prevent extra fluid in your body. · You may need to take potassium pills. Ask your doctor about this. · You may need blood tests to check on your health. For example, you may have tests to check your kidneys and your potassium level. · Take your medicines exactly as prescribed. Call your doctor if you think you are having a problem with your medicine. · Check with your doctor or pharmacist before you use any other medicines. This includes over-the-counter medicines. Make sure your doctor knows all of the medicines, vitamins, herbal products, and supplements you take. Taking some medicines together can cause problems. Where can you learn more? Go to https://reji.Xcedex. org and sign in to your Stretch account.  Enter P967 in the [facility] box to learn more about \"Learning About Diuretics for High Blood Pressure. \"     If you do not have an account, please click on the \"Sign Up Now\" link. Current as of: August 31, 2020               Content Version: 12.9  © 2006-2021 [employer]. Care instructions adapted under license by [facility] ([facility]). If you have questions about a medical condition or this instruction, always ask your healthcare professional. [address] any warranty or liability for your use of this information. Patient Education        High Blood Pressure: Care Instructions  Overview     It's normal for blood pressure to go up and down throughout the day. But if it stays up, you have high blood pressure. Another name for high blood pressure is hypertension. Despite what a lot of people think, high blood pressure usually doesn't cause headaches or make you feel dizzy or lightheaded. It usually has no symptoms. But it does increase your risk of stroke, heart attack, and other problems. You and your doctor will talk about your risks of these problems based on your blood pressure. Your doctor will give you a goal for your blood pressure. Your goal will be based on your health and your age. Lifestyle changes, such as eating healthy and being active, are always important to help lower blood pressure. You might also take medicine to reach your blood pressure goal.  Follow-up care is a key part of your treatment and safety. Be sure to make and go to all appointments, and call your doctor if you are having problems. It's also a good idea to know your test results and keep a list of the medicines you take. How can you care for yourself at home? Medical treatment  · If you stop taking your medicine, your blood pressure will go back up. You may take one or more types of medicine to lower your blood pressure. Be safe with medicines. Take your medicine exactly as prescribed.  Call your doctor if you think you are having a problem with your medicine. · Talk to your doctor before you start taking aspirin every day. Aspirin can help certain people lower their risk of a heart attack or stroke. But taking aspirin isn't right for everyone, because it can cause serious bleeding. · See your doctor regularly. You may need to see the doctor more often at first or until your blood pressure comes down. · If you are taking blood pressure medicine, talk to your doctor before you take decongestants or anti-inflammatory medicine, such as ibuprofen. Some of these medicines can raise blood pressure. · Learn how to check your blood pressure at home. Lifestyle changes  · Stay at a healthy weight. This is especially important if you put on weight around the waist. Losing even 10 pounds can help you lower your blood pressure. · If your doctor recommends it, get more exercise. Walking is a good choice. Bit by bit, increase the amount you walk every day. Try for at least 30 minutes on most days of the week. You also may want to swim, bike, or do other activities. · Avoid or limit alcohol. Talk to your doctor about whether you can drink any alcohol. · Try to limit how much sodium you eat to less than 2,300 milligrams (mg) a day. Your doctor may ask you to try to eat less than 1,500 mg a day. · Eat plenty of fruits (such as bananas and oranges), vegetables, legumes, whole grains, and low-fat dairy products. · Lower the amount of saturated fat in your diet. Saturated fat is found in animal products such as milk, cheese, and meat. Limiting these foods may help you lose weight and also lower your risk for heart disease. · Do not smoke. Smoking increases your risk for heart attack and stroke. If you need help quitting, talk to your doctor about stop-smoking programs and medicines. These can increase your chances of quitting for good. When should you call for help? Call 911  anytime you think you may need emergency care.  This may mean having symptoms that suggest that your blood pressure is causing a serious heart or blood vessel problem. Your blood pressure may be over 180/120. For example, call 911 if:    · You have symptoms of a heart attack. These may include:  ? Chest pain or pressure, or a strange feeling in the chest.  ? Sweating. ? Shortness of breath. ? Nausea or vomiting. ? Pain, pressure, or a strange feeling in the back, neck, jaw, or upper belly or in one or both shoulders or arms. ? Lightheadedness or sudden weakness. ? A fast or irregular heartbeat.     · You have symptoms of a stroke. These may include:  ? Sudden numbness, tingling, weakness, or loss of movement in your face, arm, or leg, especially on only one side of your body. ? Sudden vision changes. ? Sudden trouble speaking. ? Sudden confusion or trouble understanding simple statements. ? Sudden problems with walking or balance. ? A sudden, severe headache that is different from past headaches.     · You have severe back or belly pain. Do not wait until your blood pressure comes down on its own. Get help right away. Call your doctor now or seek immediate care if:    · Your blood pressure is much higher than normal (such as 180/120 or higher), but you don't have symptoms.     · You think high blood pressure is causing symptoms, such as:  ? Severe headache.  ? Blurry vision. Watch closely for changes in your health, and be sure to contact your doctor if:    · Your blood pressure measures higher than your doctor recommends at least 2 times. That means the top number is higher or the bottom number is higher, or both.     · You think you may be having side effects from your blood pressure medicine. Where can you learn more? Go to https://Streamupmelly.[employer]. org and sign in to your Dealised account. Enter Z384 in the [employer] box to learn more about \"High Blood Pressure: Care Instructions. \"     If you do not have an account, please click on the \"Sign Up Now\" link. Current as of: August 31, 2020               Content Version: 12.9  © 2006-2021 Healthwise, Incorporated. Care instructions adapted under license by [facility] ([facility]). If you have questions about a medical condition or this instruction, always ask your healthcare professional. [address] any warranty or liability for your use of this information.

## 2021-08-31 ENCOUNTER — TELEPHONE (OUTPATIENT)
Dept: FAMILY MEDICINE CLINIC | Age: 61
End: 2021-08-31

## 2021-08-31 NOTE — TELEPHONE ENCOUNTER
Patient saw Savi Serrano yesterday and the medication for [address] $100.00. What can we do for him? Please give Sis Wild a call back.      [address] [place] [address]. - Phone no. [phone]

## 2021-09-01 NOTE — TELEPHONE ENCOUNTER
Patient called his insurance and they were not able to give him a list of medication covered. What can we do for him now? Please give him a call back.

## 2021-09-02 ENCOUNTER — TELEPHONE (OUTPATIENT)
Dept: FAMILY MEDICINE CLINIC | Age: 61
End: 2021-09-02

## 2021-09-02 NOTE — TELEPHONE ENCOUNTER
----- Message from Gauriprincess Tran sent at 9/2/2021  7:54 AM EDT -----  Subject: Message to Provider    QUESTIONS  Information for Provider? Pt's Camron Cole called wanting to have a   called back concerning discussing the Pt's medication [place] and getting   a different alternative  ---------------------------------------------------------------------------  --------------  CALL BACK INFO  What is the best way for the office to contact you? OK to leave message on   voicemail  Preferred Call Back Phone Number? [phone]  ---------------------------------------------------------------------------  --------------  SCRIPT ANSWERS  Relationship to Patient? Other  Representative Name? Reema Bowser  Is the Representative on the appropriate HIPAA document in Epic?  Yes

## 2021-09-02 NOTE — TELEPHONE ENCOUNTER
----- Message from Kemal Sandoval sent at 9/2/2021  7:54 AM EDT -----  Subject: Message to Provider    QUESTIONS  Information for Provider? Pt's Collin Lara called wanting to have a   called back concerning discussing the Pt's medication Mi Sanchez and getting   a different alternative  ---------------------------------------------------------------------------  --------------  CALL BACK INFO  What is the best way for the office to contact you? OK to leave message on   voicemail  Preferred Call Back Phone Number? [phone]  ---------------------------------------------------------------------------  --------------  SCRIPT ANSWERS  Relationship to Patient? Other  Representative Name? Madyson Fraser  Is the Representative on the appropriate HIPAA document in Epic?  Yes

## 2021-09-07 RX ORDER — PIOGLITAZONEHYDROCHLORIDE 30 MG/1
30 TABLET ORAL DAILY
Qty: 90 TABLET | Refills: 0 | Status: ON HOLD | OUTPATIENT
Start: 2021-09-07 | End: 2022-11-01 | Stop reason: HOSPADM

## 2021-09-07 NOTE — TELEPHONE ENCOUNTER
HE WILL NEED TO HAVE RECORDS RELEASE FROM [facility]- I THINKS HE NEEDS TO HAVE PAPERWORK COMPLETED BUT SS MAY BE ABLE TO GET  FOR HIM

## 2021-09-07 NOTE — TELEPHONE ENCOUNTER
CALLED AND SPOKE TO PATIENT AND ADVISED. HE ALSO WANTS TO KNOW WHAT ALL INFO HE MAY NEED. HE HAS ANOTHER INTERVIEW WITH SOCIAL SECURITY ABOUT DISABILITY. HE THINKS HE MAY NEED RECORDS FROM US. NOT SURE WHAT ALL MAY BE BENEFICIAL FOR HIM TO HAVE.  SC

## 2021-11-16 ENCOUNTER — PATIENT MESSAGE (OUTPATIENT)
Dept: FAMILY MEDICINE CLINIC | Age: 61
End: 2021-11-16

## 2021-11-17 ENCOUNTER — TELEPHONE (OUTPATIENT)
Dept: FAMILY MEDICINE CLINIC | Age: 61
End: 2021-11-17

## 2021-11-17 NOTE — TELEPHONE ENCOUNTER
----- Message from Miranda Garner sent at 11/17/2021 10:04 AM EST -----  Subject: Message to Provider    QUESTIONS  Information for Provider? Patient is calling regarding coloscopy. Patient is stating procedure is schedule for November 29, 2021- patient is stating however, is not aware if the appointment has been actually schedule for 11/29/20. Patient needs confirmation of actually date. Patient needs procedure completed by December 2021- patient will no longer be working.  ---------------------------------------------------------------------------  --------------  [address]  What is the best way for the office to contact you? OK to leave message on   voicemail  Preferred Call Back Phone Number? [phone]  ---------------------------------------------------------------------------  --------------  SCRIPT ANSWERS  Relationship to Patient?  Self

## 2021-11-17 NOTE — TELEPHONE ENCOUNTER
From: Katalina Loaiza  To: Annabel Tuttle  Sent: 11/16/2021 6:39 PM EST  Subject: Prescription Question    2 things they no longer support trulicity   Blood sugar going crazy.     My colonoscopy has been changed to the 29th the dr indicated the procedure will need to be done in a hospital

## 2021-11-24 ENCOUNTER — ANESTHESIA EVENT (OUTPATIENT)
Dept: ENDOSCOPY | Age: 61
End: 2021-11-24
Payer: COMMERCIAL

## 2021-11-24 RX ORDER — GLIPIZIDE 10 MG/1
10 TABLET, FILM COATED, EXTENDED RELEASE ORAL NIGHTLY
Status: ON HOLD | COMMUNITY
End: 2022-11-01 | Stop reason: HOSPADM

## 2021-11-24 RX ORDER — PREGABALIN 75 MG/1
75 CAPSULE ORAL 2 TIMES DAILY
Status: ON HOLD | COMMUNITY
End: 2022-11-01 | Stop reason: HOSPADM

## 2021-11-24 NOTE — PROGRESS NOTES
C-Difficile admission screening and protocol:     * Admitted with diarrhea? YES____    NO___X__     *Prior history of C-Diff. In last 3 months? YES____   NO__X___     *Antibiotic use in the past 6-8 weeks? NO___X___YES______                 If yes which  ANTIBIOTIC AND REASON______     *Prior hospitalization or nursing home in the last month?  YES____   NO_X__

## 2021-11-24 NOTE — PROGRESS NOTES
[facility] [facility] time____0800________        Surgery time______0930______    Take the following medications with a sip of water: Follow your MD/Surgeons pre-procedure instructions regarding your medications    Do not eat or drink anything after 12:00 midnight prior to your surgery. This includes water chewing gum, mints and ice chips. You may brush your teeth and gargle the morning of your surgery, but do not swallow the water     Please see your family doctor/pediatrician for a history and physical and/or concerning medications. Bring any test results/reports from your physicians office. If you are under the care of a heart doctor or specialist doctor, please be aware that you may be asked to them for clearance    You may be asked to stop blood thinners such as Coumadin, Plavix, Fragmin, Lovenox, etc., or any anti-inflammatories such as:  Aspirin, Ibuprofen, Advil, Naproxen prior to your surgery. We also ask that you stop any OTC medications such as fish oil, vitamin E, glucosamine, garlic, Multivitamins, COQ 10, etc.    We ask that you do not smoke 24 hours prior to surgery  We ask that you do not  drink any alcoholic beverages 24 hours prior to surgery     You must make arrangements for a responsible adult to take you home after your surgery. For your safety you will not be allowed to leave alone or drive yourself home. Your surgery will be cancelled if you do not have a ride home. Also for your safety, it is strongly suggested that someone stay with you the first 24 hours after your surgery. A parent or legal guardian must accompany a child scheduled for surgery and plan to stay at the hospital until the child is discharged. Please do not bring other children with you. For your comfort, please wear simple loose fitting clothing to the hospital.  Please do not bring valuables.     Do not wear any make-up or nail polish on your fingers or toes      For your safety, please do not wear any jewelry or body piercing's on the day of surgery. All jewelry must be removed. If you have dentures, they will be removed before going to operating room. For your convenience, we will provide you with a container. If you wear contact lenses or glasses, they will be removed, please bring a case for them. If you have a living will and a durable power of  for healthcare, please bring in a copy. As part of our patient safety program to minimize surgical site infections, we ask you to do the following:    · Please notify your surgeon if you develop any illness between         now and the  day of your surgery. · This includes a cough, cold, fever, sore throat, nausea,         or vomiting, and diarrhea, etc.  ·  Please notify your surgeon if you experience dizziness, shortness         of breath or blurred vision between now and the time of your surgery. Do not shave your operative site 96 hours prior to surgery. For face and neck surgery, men may use an electric razor 48 hours   prior to surgery. You may shower the night before surgery or the morning of   your surgery with an antibacterial soap. You will need to bring a photo ID and insurance card    [facility] has an onsite pharmacy, would you like to utilize our pharmacy     If you will be staying overnight and use a C-pap machine, please bring   your C-pap to hospital     Our goal is to provide you with excellent care, therefore, visitors will be limited to two(2) in the room at a time so that we may focus on providing this care for you. Please contact pre-admission testing if you have any further questions. [facility] phone number:  [phone] [facility] fax number:  [phone]  Please note these are generalized instructions for all surgical cases, you may be provided with more specific instructions according to your surgery.

## 2021-11-29 ENCOUNTER — HOSPITAL ENCOUNTER (OUTPATIENT)
Age: 61
Setting detail: OUTPATIENT SURGERY
Discharge: HOME | End: 2021-11-29
Attending: INTERNAL MEDICINE | Admitting: INTERNAL MEDICINE
Payer: COMMERCIAL

## 2021-11-29 ENCOUNTER — ANESTHESIA (OUTPATIENT)
Dept: ENDOSCOPY | Age: 61
End: 2021-11-29
Payer: COMMERCIAL

## 2021-11-29 VITALS
WEIGHT: 264.66 LBS | DIASTOLIC BLOOD PRESSURE: 76 MMHG | HEIGHT: 70 IN | BODY MASS INDEX: 37.89 KG/M2 | TEMPERATURE: 96.9 F | RESPIRATION RATE: 16 BRPM | SYSTOLIC BLOOD PRESSURE: 142 MMHG | OXYGEN SATURATION: 97 % | HEART RATE: 76 BPM

## 2021-11-29 VITALS — SYSTOLIC BLOOD PRESSURE: 171 MMHG | OXYGEN SATURATION: 100 % | DIASTOLIC BLOOD PRESSURE: 82 MMHG

## 2021-11-29 DIAGNOSIS — Z12.11 COLON CANCER SCREENING: ICD-10-CM

## 2021-11-29 LAB
GLUCOSE BLD-MCNC: 277 MG/DL (ref 70–99)
GLUCOSE BLD-MCNC: 324 MG/DL (ref 70–99)
GLUCOSE BLD-MCNC: 329 MG/DL (ref 70–99)
PERFORMED ON: ABNORMAL

## 2021-11-29 PROCEDURE — 3609010600 HC COLONOSCOPY POLYPECTOMY SNARE/COLD BIOPSY: Performed by: INTERNAL MEDICINE

## 2021-11-29 PROCEDURE — 2500000003 HC RX 250 WO HCPCS: Performed by: NURSE ANESTHETIST, CERTIFIED REGISTERED

## 2021-11-29 PROCEDURE — 7100000010 HC PHASE II RECOVERY - FIRST 15 MIN: Performed by: INTERNAL MEDICINE

## 2021-11-29 PROCEDURE — 2709999900 HC NON-CHARGEABLE SUPPLY: Performed by: INTERNAL MEDICINE

## 2021-11-29 PROCEDURE — 6360000002 HC RX W HCPCS: Performed by: NURSE ANESTHETIST, CERTIFIED REGISTERED

## 2021-11-29 PROCEDURE — 3700000000 HC ANESTHESIA ATTENDED CARE: Performed by: INTERNAL MEDICINE

## 2021-11-29 PROCEDURE — 7100000001 HC PACU RECOVERY - ADDTL 15 MIN: Performed by: INTERNAL MEDICINE

## 2021-11-29 PROCEDURE — 7100000000 HC PACU RECOVERY - FIRST 15 MIN: Performed by: INTERNAL MEDICINE

## 2021-11-29 PROCEDURE — 7100000011 HC PHASE II RECOVERY - ADDTL 15 MIN: Performed by: INTERNAL MEDICINE

## 2021-11-29 PROCEDURE — 88305 TISSUE EXAM BY PATHOLOGIST: CPT

## 2021-11-29 PROCEDURE — 3700000001 HC ADD 15 MINUTES (ANESTHESIA): Performed by: INTERNAL MEDICINE

## 2021-11-29 PROCEDURE — 2580000003 HC RX 258: Performed by: ANESTHESIOLOGY

## 2021-11-29 PROCEDURE — 6370000000 HC RX 637 (ALT 250 FOR IP): Performed by: ANESTHESIOLOGY

## 2021-11-29 RX ORDER — SODIUM CHLORIDE 9 MG/ML
INJECTION, SOLUTION INTRAVENOUS CONTINUOUS
Status: DISCONTINUED | OUTPATIENT
Start: 2021-11-29 | End: 2021-11-29 | Stop reason: HOSPADM

## 2021-11-29 RX ORDER — PROPOFOL 10 MG/ML
INJECTION, EMULSION INTRAVENOUS CONTINUOUS PRN
Status: DISCONTINUED | OUTPATIENT
Start: 2021-11-29 | End: 2021-11-29 | Stop reason: SDUPTHER

## 2021-11-29 RX ORDER — SODIUM CHLORIDE 0.9 % (FLUSH) 0.9 %
10 SYRINGE (ML) INJECTION EVERY 12 HOURS SCHEDULED
Status: DISCONTINUED | OUTPATIENT
Start: 2021-11-29 | End: 2021-11-29 | Stop reason: HOSPADM

## 2021-11-29 RX ORDER — SODIUM CHLORIDE 9 MG/ML
25 INJECTION, SOLUTION INTRAVENOUS PRN
Status: DISCONTINUED | OUTPATIENT
Start: 2021-11-29 | End: 2021-11-29 | Stop reason: HOSPADM

## 2021-11-29 RX ORDER — SODIUM CHLORIDE 0.9 % (FLUSH) 0.9 %
10 SYRINGE (ML) INJECTION PRN
Status: DISCONTINUED | OUTPATIENT
Start: 2021-11-29 | End: 2021-11-29 | Stop reason: HOSPADM

## 2021-11-29 RX ORDER — LIDOCAINE HYDROCHLORIDE 20 MG/ML
INJECTION, SOLUTION EPIDURAL; INFILTRATION; INTRACAUDAL; PERINEURAL PRN
Status: DISCONTINUED | OUTPATIENT
Start: 2021-11-29 | End: 2021-11-29 | Stop reason: SDUPTHER

## 2021-11-29 RX ORDER — PROPOFOL 10 MG/ML
INJECTION, EMULSION INTRAVENOUS PRN
Status: DISCONTINUED | OUTPATIENT
Start: 2021-11-29 | End: 2021-11-29 | Stop reason: SDUPTHER

## 2021-11-29 RX ADMIN — PROPOFOL 80 MG: 10 INJECTION, EMULSION INTRAVENOUS at 09:39

## 2021-11-29 RX ADMIN — INSULIN HUMAN 5 UNITS: 100 INJECTION, SOLUTION PARENTERAL at 08:47

## 2021-11-29 RX ADMIN — SODIUM CHLORIDE: 9 INJECTION, SOLUTION INTRAVENOUS at 09:37

## 2021-11-29 RX ADMIN — PROPOFOL 200 MCG/KG/MIN: 10 INJECTION, EMULSION INTRAVENOUS at 09:39

## 2021-11-29 RX ADMIN — LIDOCAINE HYDROCHLORIDE 60 MG: 20 INJECTION, SOLUTION EPIDURAL; INFILTRATION; INTRACAUDAL; PERINEURAL at 09:39

## 2021-11-29 ASSESSMENT — PULMONARY FUNCTION TESTS
PIF_VALUE: 1
PIF_VALUE: 0
PIF_VALUE: 1
PIF_VALUE: 0
PIF_VALUE: 1
PIF_VALUE: 0
PIF_VALUE: 1

## 2021-11-29 ASSESSMENT — PAIN SCALES - GENERAL
PAINLEVEL_OUTOF10: 0

## 2021-11-29 ASSESSMENT — PAIN - FUNCTIONAL ASSESSMENT: PAIN_FUNCTIONAL_ASSESSMENT: 0-10

## 2021-11-29 ASSESSMENT — PAIN DESCRIPTION - DESCRIPTORS: DESCRIPTORS: ACHING;DISCOMFORT

## 2021-11-29 ASSESSMENT — LIFESTYLE VARIABLES: SMOKING_STATUS: 0

## 2021-11-29 NOTE — PROGRESS NOTES
Pt alert. Denies pain. VSS. Discharge instructions reviewed with pt and girlfriend. Both express an understanding of instructions. Pt dressing on side of stretcher with girlfriend's assistance.

## 2021-11-29 NOTE — OP NOTE
demonstrated:    1. A 5 mm polyp in the ascending colon removed completely with cold snare polypectomy  2. A 5 mm polyp in the ascending colon removed completely with cold snare polypectomy  3. A 5 mm polyp in the transverse colon removed completely with cold snare polypectomy    The scope was then withdrawn into the rectum and retroflexed. The retroflexed view of the anal verge and rectum demonstrates normal rectum. The scope was straightened, the colon was decompressed and the scope was withdrawn from the patient. The patient tolerated the procedure well and was taken to the PACU in good condition. Estimated Blood Loss (mL): < 5 CC     Complications: None    Specimens:   ID Type Source Tests Collected by Time Destination   A : ascending colon polyps x2 and transverse x1 Tissue Colon SURGICAL PATHOLOGY Marvin Cordero MD 11/29/2021 1093        Impression:  See post-procedure diagnoses. Recommendations:   1. Await pathology results  2. Recommend repeat colonoscopy in 3 years for surveillance purposes  3. The patient had biopsies taken today. The patient should call for results in 7 days if they have not heard from our office. Our number is [phone].     PORTER [address]  11/29/2021  [phone]

## 2021-11-29 NOTE — H&P
Pre-operative History and Physical    Patient: Apurva Quiroga  : 1960  Acct#:     Intended Procedure: Colonoscopy     HISTORY OF PRESENT ILLNESS:  The patient is a 57 y.o. male  who presents for/due to Screening       Past Medical History:        Diagnosis Date    Carbuncle     requiring i and d in hospital/[facility], now avoids them with adam tree oil    DM2 (diabetes mellitus, type 2) ([address]     ED (erectile dysfunction)     cialis works well Cynthia Gemma causes panic    History of heart attack     HTN (hypertension)     Hyperlipidemia     Peripheral neuropathy     both legs and feet tingle/numb/ worse left    Sleep apnea     does not use Cpap machine    Wears glasses      Past Surgical History:        Procedure Laterality Date    BACK SURGERY      epidural injection    CARDIAC CATHETERIZATION      normal    CARDIOVASCULAR STRESS TEST  2012    normal    CARDIOVASCULAR STRESS TEST  2019    normal    CYST REMOVAL      PILONIDAL CYST EXCISION       Medications Prior to Admission:   Prior to Admission medications    Medication Sig Start Date End Date Taking? Authorizing Provider   Multiple Vitamins-Minerals (OCUVITE ADULT FORMULA PO) Take 1 tablet by mouth daily   Yes Historical Provider, MD   glipiZIDE (GLUCOTROL XL) 10 MG extended release tablet Take 10 mg by mouth nightly   Yes Historical Provider, MD   pregabalin (LYRICA) 75 MG capsule Take 75 mg by mouth 2 times daily.    Yes Historical Provider, MD   pioglitazone (ACTOS) 30 MG tablet Take 1 tablet by mouth daily 21 Yes Kathlean Landau, APRN - CNP   lisinopril-hydroCHLOROthiazide (PRINZIDE;ZESTORETIC) 20-12.5 MG per tablet TAKE TWO TABLETS BY MOUTH IN THE MORNING FOR HTN 21  Yes MECHE Fiscehr CNP   atorvastatin (LIPITOR) 20 MG tablet Take 1 tablet by mouth nightly To lower cholesterol 21  Yes MECHE Ignacio CNP   insulin glargine [facility]) 100 UNIT/ML injection pen INJECT 80 UNITS SUBCUTANEOUSLY ONCE DAILY WITH SUPPER  Patient taking differently: INJECT 120 UNITS SUBCUTANEOUSLY ONCE DAILY WITH SUPPER 8/30/21  Yes MECHE Fonseca CNP   aspirin (ANACIN) 81 MG EC tablet Take 81 mg by mouth daily. Yes Historical Provider, MD   Insulin Syringe-Needle U-100 ([employer] INSULIN SYRINGE) 31G X 5/16\" 1 ML MISC 1 each by Does not apply route daily 8/30/21   MECHE Crowder CNP       Allergies:  Galileo Oseguera [linagliptin] and Codeine    Social History:   TOBACCO:   reports that he quit smoking about 7 years ago. His smoking use included cigars. He has a 5.00 pack-year smoking history. He has never used smokeless tobacco.  ETOH:   reports previous alcohol use of about 0.8 - 1.7 standard drinks of alcohol per week. DRUGS:   reports previous drug use. Drugs: Marijuana (Weed) and Cocaine. PHYSICAL EXAM:      Vital Signs: Ht 5' 10\" (1.778 m)   Wt 262 lb (118.8 kg)   BMI 37.59 kg/m²    Airway: No stridor or wheezing noted. Good air movement  Pulmonary: without wheezes. Clear to auscultation  Cardiac:regular rate and rhythm without loud murmurs  Abdomen:soft, nontender,  Bowel sounds present    Pre-Procedure Assessment / Plan:  1) Colonoscopy     ASA Grade:  ASA 3 - Patient with moderate systemic disease with functional limitations  Mallampati Classification:  Class II    Level of Sedation Plan:Deep sedation    Post Procedure plan: Return to same level of care    I assessed the patient and find that the patient is in satisfactory condition to proceed with the planned procedure and sedation plan. I have explained the risk, benefits, and alternatives to the procedure; the patient understands and agrees to proceed.        Erika Harris MD  11/29/2021

## 2021-11-29 NOTE — ANESTHESIA POSTPROCEDURE EVALUATION
Department of Anesthesiology  Postprocedure Note    Patient: Mari Lincoln  MRN: [id number]  YOB: 1960  Date of evaluation: 11/29/2021  Time:  11:10 AM     Procedure Summary     Date: 11/29/21 Room / Location: [address] [place] [address]    Anesthesia Start: 3483 Anesthesia Stop: 1001    Procedure: COLONOSCOPY POLYPECTOMY SNARE/COLD BIOPSY (N/A ) Diagnosis:       Colon cancer screening      (COLON CANCER SCREENING)    Surgeons: Angie Carolina MD Responsible Provider: Moises Huang MD    Anesthesia Type: MAC ASA Status: 3          Anesthesia Type: MAC    Bisi Phase I: Bisi Score: 10    Bisi Phase II: Bisi Score: 10    Last vitals: Reviewed and per EMR flowsheets.        Anesthesia Post Evaluation    Patient location during evaluation: PACU  Patient participation: complete - patient participated  Level of consciousness: awake and alert  Pain score: 3  Airway patency: patent  Nausea & Vomiting: no nausea and no vomiting  Complications: no  Cardiovascular status: blood pressure returned to baseline  Respiratory status: acceptable  Hydration status: euvolemic

## 2021-11-29 NOTE — PROGRESS NOTES
Pt is awake & alert, conversing, VSS on RA, denies pain, no n/v, tolerating ice chips. Transfer to Phase II.

## 2021-11-29 NOTE — ANESTHESIA PRE PROCEDURE
[facility] Department of Anesthesiology  Pre-Anesthesia Evaluation/Consultation       Name:  Devon Ortiz  : 1960  Age:  57 y.o.                                            MRN:  [id number]  Date: 2021           Surgeon: Surgeon(s):  Beba Badillo MD    Procedure: Procedure(s):  COLONOSCOPY     Allergies   Allergen Reactions    Tradjenta [Linagliptin] Hives    Codeine Itching     Patient Active Problem List   Diagnosis    HTN (hypertension)    DM2 (diabetes mellitus, type 2) [address] [address]    High cholesterol     Past Medical History:   Diagnosis Date    Carbuncle     [facility], now avoids them with adam tree oil    DM2 (diabetes mellitus, type 2) ([facility] [address]     ED (erectile dysfunction)     cialis works well [employer] causes panic    History of heart attack     HTN (hypertension)     Hyperlipidemia     Peripheral neuropathy     both legs and feet tingle/numb/ worse left    Sleep apnea     does not use Cpap machine    Wears glasses      Past Surgical History:   Procedure Laterality Date    BACK SURGERY      epidural injection    CARDIAC CATHETERIZATION  2006    normal    CARDIOVASCULAR STRESS TEST  2012    normal    CARDIOVASCULAR STRESS TEST  2019    normal    CYST REMOVAL      PILONIDAL CYST EXCISION       Social History     Tobacco Use    Smoking status: Former Smoker     Packs/day: 1.00     Years: 5.00     Pack years: 5.00     Types: Cigars     Quit date: 2014     Years since quittin.2    Smokeless tobacco: Never Used    Tobacco comment: total smoking cigars 10 years   Vaping Use    Vaping Use: Never used   Substance Use Topics    Alcohol use: Not Currently     Alcohol/week: 0.8 - 1.7 standard drinks     Types: 1 - 2 Standard drinks or equivalent per week    Drug use: Not Currently     Types: Marijuana [place] Em[place]), Cocaine     Comment: quit 2006     Medications  No current facility-administered medications on file prior to encounter. Current Outpatient Medications on File Prior to Encounter   Medication Sig Dispense Refill    Multiple Vitamins-Minerals (OCUVITE ADULT FORMULA PO) Take 1 tablet by mouth daily      glipiZIDE (GLUCOTROL XL) 10 MG extended release tablet Take 10 mg by mouth nightly      pregabalin (LYRICA) 75 MG capsule Take 75 mg by mouth 2 times daily.  pioglitazone (ACTOS) 30 MG tablet Take 1 tablet by mouth daily 90 tablet 0    lisinopril-hydroCHLOROthiazide (PRINZIDE;ZESTORETIC) 20-12.5 MG per tablet TAKE TWO TABLETS BY MOUTH IN THE MORNING FOR HTN 60 tablet 5    atorvastatin (LIPITOR) 20 MG tablet Take 1 tablet by mouth nightly To lower cholesterol 30 tablet 5    insulin glargine (BASAGLAR KWIKPEN) 100 UNIT/ML injection pen INJECT 80 UNITS SUBCUTANEOUSLY ONCE DAILY WITH SUPPER (Patient taking differently: INJECT 120 UNITS SUBCUTANEOUSLY ONCE DAILY WITH SUPPER) 24 mL 3    aspirin (ANACIN) 81 MG EC tablet Take 81 mg by mouth daily.       Insulin Syringe-Needle U-100 ([employer] INSULIN SYRINGE) 31G X 5/16\" 1 ML MISC 1 each by Does not apply route daily 100 each 3     Current Facility-Administered Medications   Medication Dose Route Frequency Provider Last Rate Last Admin    0.9 % sodium chloride infusion   IntraVENous Continuous Sims Galeazzi, MD        sodium chloride flush 0.9 % injection 10 mL  10 mL IntraVENous 2 times per day Sims Galeazzi, MD        sodium chloride flush 0.9 % injection 10 mL  10 mL IntraVENous PRN Sims Galeazzi, MD        0.9 % sodium chloride infusion  25 mL IntraVENous PRN Sims Galeazzi, MD         Vital Signs (Current)   Vitals:    11/24/21 1420 11/29/21 0756   BP:  (!) 174/95   Pulse:  82   Resp:  16   Temp:  97.6 °F (36.4 °C)   TempSrc:  Temporal   SpO2:  98%   Weight: 262 lb (118.8 kg) 264 lb 10.6 oz (120.1 kg)   Height: 5' 10\" (1.778 m) 5' 10\" (1.778 m)                                            Vital Signs Statistics (for past 48 hrs)     Temp  Av.6 °F (36.4 °C)  Min: 97.6 °F (36.4 °C)   Min taken time: 21  Max: 97.6 °F (36.4 °C)   Max taken time: 21  Pulse  Av  Min: 82   Min taken time: 21  Max: 82   Max taken time: 21  Resp  Av  Min: 16   Min taken time: 21  Max: 16   Max taken time: 21  BP  Min: 174/95   Min taken time: 21  Max: 174/95   Max taken time: 21  SpO2  Av %  Min: 98 %   Min taken time: 21  Max: 98 %   Max taken time: 21  BP Readings from Last 3 Encounters:   21 (!) 174/95   21 (!) 150/88   21 (!) 160/90       BMI  Body mass index is 37.98 kg/m². Estimated body mass index is 37.98 kg/m² as calculated from the following:    Height as of this encounter: 5' 10\" (1.778 m). Weight as of this encounter: 264 lb 10.6 oz (120.1 kg). CBC   Lab Results   Component Value Date    WBC 7.1 2021    RBC 5.42 2021    HGB 14.2 2021    HCT 42.9 2021    MCV 79.2 2021    RDW 13.5 2021     2021     CMP    Lab Results   Component Value Date     2021    K 4.3 2021     2021    CO2 24 2021    BUN 19 2021    CREATININE 0.9 2021    GFRAA >60 2021    GFRAA >60 2013    AGRATIO 1.6 2021    LABGLOM >60 2021    GLUCOSE 135 2021    PROT 6.7 2021    PROT 7.1 2010    CALCIUM 9.7 2021    BILITOT 0.4 2021    ALKPHOS 62 2021    AST 28 2021    ALT 27 2021     BMP    Lab Results   Component Value Date     2021    K 4.3 2021     2021    CO2 24 2021    BUN 19 2021    CREATININE 0.9 2021    CALCIUM 9.7 2021    GFRAA >60 2021    GFRAA >60 2013    LABGLOM >60 2021    GLUCOSE 135 2021     POCGlucose  No results for input(s): GLUCOSE in the last 72 hours.    Coags  No results found for: PROTIME, INR, APTT  HCG (If Applicable) No results found for: PREGTESTUR, PREGSERUM, HCG, HCGQUANT   ABGs No results found for: PHART, PO2ART, INZ2YWC, YIY0XPZ, BEART, F4XOSLPE   Type & Screen (If Applicable)  No results found for: LABABO, LABRH                         BMI: Wt Readings from Last 3 Encounters:       NPO Status:   Date of last liquid consumption: 11/29/21   Time of last liquid consumption: 0300   Date of last solid food consumption: 11/26/21      Time of last solid consumption: 1800       Anesthesia Evaluation  Patient summary reviewed no history of anesthetic complications:   Airway: Mallampati: II  TM distance: >3 FB   Neck ROM: full  Mouth opening: > = 3 FB Dental: normal exam         Pulmonary: breath sounds clear to auscultation  (+) sleep apnea:      (-) COPD, asthma and not a current smoker                           Cardiovascular:  Exercise tolerance: good (>4 METS),   (+) hypertension:, hyperlipidemia    (-) past MI, CABG/stent and orthopnea        Rate: normal                    Neuro/Psych:      (-) seizures, TIA, CVA, psychiatric history and depression/anxiety            GI/Hepatic/Renal:        (-) GERD, liver disease and no renal disease       Endo/Other:    (+) DiabetesType II DM, , .    (-) hypothyroidism               Abdominal:             Vascular:     - DVT and PE. Other Findings:             Anesthesia Plan      MAC     ASA 3       Induction: intravenous. Anesthetic plan and risks discussed with patient. Plan discussed with CRNA. This pre-anesthesia assessment may be used as a history and physical.    DOS STAFF ADDENDUM:    Pt seen and examined, chart reviewed (including anesthesia, drug and allergy history). No interval changes to history and physical examination. Anesthetic plan, risks, benefits, alternatives, and personnel involved discussed with patient. Patient verbalized an understanding and agrees to proceed.       Roselinda Meckel, MD  November 29, 2021  8:13 AM

## 2021-11-30 ENCOUNTER — PATIENT MESSAGE (OUTPATIENT)
Dept: FAMILY MEDICINE CLINIC | Age: 61
End: 2021-11-30

## 2021-11-30 NOTE — TELEPHONE ENCOUNTER
From: Devan Loaiza  To: Kathlean Landau  Sent: 11/30/2021 9:45 AM EST  Subject: Question regarding PATHOLOGY TISSUE    So does this mean everythings ok  And im going to live forever ?

## 2021-12-02 ENCOUNTER — PATIENT MESSAGE (OUTPATIENT)
Dept: FAMILY MEDICINE CLINIC | Age: 61
End: 2021-12-02

## 2021-12-02 DIAGNOSIS — E11.42 TYPE 2 DIABETES MELLITUS WITH DIABETIC POLYNEUROPATHY, WITH LONG-TERM CURRENT USE OF INSULIN (HCC): ICD-10-CM

## 2021-12-02 DIAGNOSIS — Z79.4 TYPE 2 DIABETES MELLITUS WITH DIABETIC POLYNEUROPATHY, WITH LONG-TERM CURRENT USE OF INSULIN (HCC): ICD-10-CM

## 2021-12-02 NOTE — TELEPHONE ENCOUNTER
THERE IS NO CARD HE WILL NEED TO TEXT READY TO [address] NUMBER ON CARD- THEN TAKE SCRIPTS TO PHARMACY - THEY WILL RUN WITHOUT THIS IF I SEND TO PHARMACY

## 2021-12-02 NOTE — TELEPHONE ENCOUNTER
[phone] GIRLFRIEND NANCY HANDLEY (ON HIPAA) CALLED IN AND WANTS HIS RX'S FOR Semaglutide 3 MG TABS   AND Semaglutide 7 MG TABS  SENT TO [facility]. SHE STATES THEY HAVE DONE THIS BEFORE AND JUST GIVE THE PHARMACY THE NUMBER FROM THE CARD AND THEY WILL RUN IT FOR THEM WHEN IT IS READY.      PLEASE SEND IN AND WRITE PATIENT ON BexT WHEN COMPLETED

## 2021-12-02 NOTE — TELEPHONE ENCOUNTER
From: Olga Loaiza  To: Hugo Beckett  Sent: 12/2/2021 1:18 PM EST  Subject: New perscriptions ?     I thought we were adding a additional diabetes med  And maybe a additional bp med i called [facility]   They indicated that the perscriptions they recived was for basaglar actos and glipzide

## 2021-12-06 ENCOUNTER — PATIENT MESSAGE (OUTPATIENT)
Dept: FAMILY MEDICINE CLINIC | Age: 61
End: 2021-12-06

## 2021-12-06 NOTE — TELEPHONE ENCOUNTER
From: Gretchen Loaiza  To: Renetta Conroy  Sent: 12/6/2021 2:53 AM EST  Subject: No show ?     Received message that indicated i was a \"no show\" for my dec 2 appointment however i was there

## 2021-12-13 ENCOUNTER — TELEPHONE (OUTPATIENT)
Dept: FAMILY MEDICINE CLINIC | Age: 61
End: 2021-12-13

## 2021-12-13 NOTE — TELEPHONE ENCOUNTER
If he is having difficulty - breathing or feels it is hard to get a breath he needs to be seen can go to urgent care -er

## 2021-12-13 NOTE — TELEPHONE ENCOUNTER
Pt is calling to say that last night he was relaxing laying in bed and he noticed it was hard to catch his breath. He got up and walked around a little to see if that would hep--he took a hot shower--it felt like he was drowning and his girlfriend called the squad his BP was high at 176/115. It was time for him to take his nighttime med. It did drop down to 124.70. He did not go to the hospital.    No pressure on his chest no time did her feel like if was life threatening. This morning he seems fine just every now and then he does still feel like hard to breath. He just wanted his doctor to know.    Please call pt

## 2022-01-10 ENCOUNTER — TELEPHONE (OUTPATIENT)
Dept: FAMILY MEDICINE CLINIC | Age: 62
End: 2022-01-10

## 2022-01-10 NOTE — TELEPHONE ENCOUNTER
Pt paper work was dropped off last Tuesday. It was from a financial place that if we fill it out they will pay his loan. Do we have it done yet?

## 2022-06-07 LAB
AVERAGE GLUCOSE: ABNORMAL
HBA1C MFR BLD: 8.3 %

## 2022-08-22 LAB
AVERAGE GLUCOSE: ABNORMAL
HBA1C MFR BLD: 8.6 %

## 2022-10-12 LAB
CHOLESTEROL, TOTAL: 111 MG/DL
CHOLESTEROL/HDL RATIO: ABNORMAL
HDLC SERPL-MCNC: 34 MG/DL (ref 35–70)
LDL CHOLESTEROL CALCULATED: 33 MG/DL (ref 0–160)
NONHDLC SERPL-MCNC: ABNORMAL MG/DL
TRIGL SERPL-MCNC: 222 MG/DL
VLDLC SERPL CALC-MCNC: ABNORMAL MG/DL

## 2022-10-18 ENCOUNTER — HOSPITAL ENCOUNTER (INPATIENT)
Age: 62
DRG: 057 | End: 2022-10-18
Attending: PHYSICAL MEDICINE & REHABILITATION | Admitting: PHYSICAL MEDICINE & REHABILITATION
Payer: OTHER GOVERNMENT

## 2022-10-18 DIAGNOSIS — R80.9 MICROALBUMINURIA DUE TO TYPE 2 DIABETES MELLITUS (HCC): ICD-10-CM

## 2022-10-18 DIAGNOSIS — E11.65 TYPE 2 DIABETES MELLITUS WITH HYPERGLYCEMIA, WITH LONG-TERM CURRENT USE OF INSULIN (HCC): ICD-10-CM

## 2022-10-18 DIAGNOSIS — Z79.4 TYPE 2 DIABETES MELLITUS WITH HYPERGLYCEMIA, WITH LONG-TERM CURRENT USE OF INSULIN (HCC): ICD-10-CM

## 2022-10-18 DIAGNOSIS — E11.42 TYPE 2 DIABETES MELLITUS WITH DIABETIC POLYNEUROPATHY, WITH LONG-TERM CURRENT USE OF INSULIN (HCC): ICD-10-CM

## 2022-10-18 DIAGNOSIS — Z79.4 TYPE 2 DIABETES MELLITUS WITH DIABETIC POLYNEUROPATHY, WITH LONG-TERM CURRENT USE OF INSULIN (HCC): ICD-10-CM

## 2022-10-18 DIAGNOSIS — E11.29 MICROALBUMINURIA DUE TO TYPE 2 DIABETES MELLITUS (HCC): ICD-10-CM

## 2022-10-18 PROBLEM — I63.9 ACUTE EMBOLIC STROKE (HCC): Status: ACTIVE | Noted: 2022-10-18

## 2022-10-18 LAB
GLUCOSE BLD-MCNC: 246 MG/DL (ref 70–99)
PERFORMED ON: ABNORMAL

## 2022-10-18 PROCEDURE — 1280000000 HC REHAB R&B

## 2022-10-18 PROCEDURE — 2580000003 HC RX 258: Performed by: PHYSICAL MEDICINE & REHABILITATION

## 2022-10-18 PROCEDURE — 6370000000 HC RX 637 (ALT 250 FOR IP): Performed by: PHYSICAL MEDICINE & REHABILITATION

## 2022-10-18 RX ORDER — METHOCARBAMOL 500 MG/1
500 TABLET, FILM COATED ORAL 3 TIMES DAILY PRN
Status: DISCONTINUED | OUTPATIENT
Start: 2022-10-18 | End: 2022-11-01 | Stop reason: HOSPADM

## 2022-10-18 RX ORDER — ONDANSETRON 4 MG/1
4 TABLET, ORALLY DISINTEGRATING ORAL EVERY 8 HOURS PRN
Status: DISCONTINUED | OUTPATIENT
Start: 2022-10-18 | End: 2022-11-01 | Stop reason: HOSPADM

## 2022-10-18 RX ORDER — ACETAMINOPHEN 325 MG/1
650 TABLET ORAL EVERY 4 HOURS PRN
Status: DISCONTINUED | OUTPATIENT
Start: 2022-10-18 | End: 2022-11-01 | Stop reason: HOSPADM

## 2022-10-18 RX ORDER — INSULIN LISPRO 100 [IU]/ML
10 INJECTION, SOLUTION INTRAVENOUS; SUBCUTANEOUS
Status: DISCONTINUED | OUTPATIENT
Start: 2022-10-18 | End: 2022-10-26

## 2022-10-18 RX ORDER — LANOLIN ALCOHOL/MO/W.PET/CERES
6 CREAM (GRAM) TOPICAL NIGHTLY PRN
Status: DISCONTINUED | OUTPATIENT
Start: 2022-10-18 | End: 2022-11-01 | Stop reason: HOSPADM

## 2022-10-18 RX ORDER — FUROSEMIDE 20 MG/1
20 TABLET ORAL EVERY OTHER DAY
Status: DISCONTINUED | OUTPATIENT
Start: 2022-10-19 | End: 2022-11-01 | Stop reason: HOSPADM

## 2022-10-18 RX ORDER — CAPSAICIN 0.025 %
CREAM (GRAM) TOPICAL 2 TIMES DAILY
Status: DISCONTINUED | OUTPATIENT
Start: 2022-10-18 | End: 2022-11-01 | Stop reason: HOSPADM

## 2022-10-18 RX ORDER — LANOLIN ALCOHOL/MO/W.PET/CERES
500 CREAM (GRAM) TOPICAL EVERY OTHER DAY
Status: DISCONTINUED | OUTPATIENT
Start: 2022-10-19 | End: 2022-11-01 | Stop reason: HOSPADM

## 2022-10-18 RX ORDER — TRAMADOL HYDROCHLORIDE 50 MG/1
50 TABLET ORAL EVERY 6 HOURS PRN
Status: DISCONTINUED | OUTPATIENT
Start: 2022-10-18 | End: 2022-11-01 | Stop reason: HOSPADM

## 2022-10-18 RX ORDER — INSULIN LISPRO 100 [IU]/ML
0-4 INJECTION, SOLUTION INTRAVENOUS; SUBCUTANEOUS NIGHTLY
Status: DISCONTINUED | OUTPATIENT
Start: 2022-10-18 | End: 2022-11-01 | Stop reason: HOSPADM

## 2022-10-18 RX ORDER — ATENOLOL 50 MG/1
25 TABLET ORAL DAILY
Status: DISCONTINUED | OUTPATIENT
Start: 2022-10-19 | End: 2022-11-01 | Stop reason: HOSPADM

## 2022-10-18 RX ORDER — POTASSIUM CHLORIDE 20 MEQ/1
20 TABLET, EXTENDED RELEASE ORAL
Status: DISCONTINUED | OUTPATIENT
Start: 2022-10-19 | End: 2022-11-01 | Stop reason: HOSPADM

## 2022-10-18 RX ORDER — TRAZODONE HYDROCHLORIDE 50 MG/1
50 TABLET ORAL NIGHTLY PRN
Status: DISCONTINUED | OUTPATIENT
Start: 2022-10-18 | End: 2022-11-01 | Stop reason: HOSPADM

## 2022-10-18 RX ORDER — INSULIN GLARGINE 100 [IU]/ML
40 INJECTION, SOLUTION SUBCUTANEOUS NIGHTLY
Status: DISCONTINUED | OUTPATIENT
Start: 2022-10-18 | End: 2022-10-26

## 2022-10-18 RX ORDER — DIPHENHYDRAMINE HCL 25 MG
25 TABLET ORAL EVERY 6 HOURS PRN
Status: DISCONTINUED | OUTPATIENT
Start: 2022-10-18 | End: 2022-11-01 | Stop reason: HOSPADM

## 2022-10-18 RX ORDER — LISINOPRIL 5 MG/1
5 TABLET ORAL DAILY
Status: DISCONTINUED | OUTPATIENT
Start: 2022-10-18 | End: 2022-11-01 | Stop reason: HOSPADM

## 2022-10-18 RX ORDER — HYDRALAZINE HYDROCHLORIDE 25 MG/1
50 TABLET, FILM COATED ORAL EVERY 8 HOURS PRN
Status: DISCONTINUED | OUTPATIENT
Start: 2022-10-18 | End: 2022-11-01 | Stop reason: HOSPADM

## 2022-10-18 RX ORDER — INSULIN LISPRO 100 [IU]/ML
0-8 INJECTION, SOLUTION INTRAVENOUS; SUBCUTANEOUS
Status: DISCONTINUED | OUTPATIENT
Start: 2022-10-18 | End: 2022-11-01 | Stop reason: HOSPADM

## 2022-10-18 RX ORDER — HYDROCHLOROTHIAZIDE 25 MG/1
12.5 TABLET ORAL DAILY
Status: DISCONTINUED | OUTPATIENT
Start: 2022-10-19 | End: 2022-11-01 | Stop reason: HOSPADM

## 2022-10-18 RX ORDER — SODIUM CHLORIDE 0.9 % (FLUSH) 0.9 %
5-40 SYRINGE (ML) INJECTION 2 TIMES DAILY
Status: DISCONTINUED | OUTPATIENT
Start: 2022-10-18 | End: 2022-10-22

## 2022-10-18 RX ORDER — ATORVASTATIN CALCIUM 40 MG/1
40 TABLET, FILM COATED ORAL NIGHTLY
Status: DISCONTINUED | OUTPATIENT
Start: 2022-10-18 | End: 2022-11-01 | Stop reason: HOSPADM

## 2022-10-18 RX ORDER — DEXTROSE MONOHYDRATE 100 MG/ML
INJECTION, SOLUTION INTRAVENOUS CONTINUOUS PRN
Status: DISCONTINUED | OUTPATIENT
Start: 2022-10-18 | End: 2022-11-01 | Stop reason: HOSPADM

## 2022-10-18 RX ADMIN — ATORVASTATIN CALCIUM 40 MG: 40 TABLET, FILM COATED ORAL at 20:27

## 2022-10-18 RX ADMIN — APIXABAN 5 MG: 5 TABLET, FILM COATED ORAL at 20:27

## 2022-10-18 RX ADMIN — INSULIN GLARGINE 40 UNITS: 100 INJECTION, SOLUTION SUBCUTANEOUS at 20:29

## 2022-10-18 RX ADMIN — SODIUM CHLORIDE, PRESERVATIVE FREE 10 ML: 5 INJECTION INTRAVENOUS at 20:37

## 2022-10-18 RX ADMIN — CAPSAICIN: 0.25 CREAM TOPICAL at 20:28

## 2022-10-18 ASSESSMENT — PAIN SCALES - GENERAL
PAINLEVEL_OUTOF10: 10
PAINLEVEL_OUTOF10: 0

## 2022-10-18 NOTE — PROGRESS NOTES
4 Eyes Skin Assessment     NAME:  Jake Loaiza  YOB: 1960  MEDICAL RECORD NUMBER:  [id number]    The patient is being assess for  Admission    I agree that 2 RN's have performed a thorough Head to Toe Skin Assessment on the patient. ALL assessment sites listed below have been assessed. Areas assessed by both nurses:    Head, Face, Ears, Shoulders, Back, Chest, Arms, Elbows, Hands, Sacrum. Buttock, Coccyx, Ischium, and Legs. Feet and Heels        Does the Patient have a Wound?  No noted wound(s)       Vinicius Prevention initiated:  Yes   Wound Care Orders initiated:  No    Pressure Injury (Stage 3,4, Unstageable, DTI, NWPT, and Complex wounds) if present place referral/consult order under [de-identified] No    New and Established Ostomies if present place consult order under : No      Nurse 1 eSignature: Electronically signed by Eveline Aggarwal RN on 10/18/22 at 6:59 PM EDT    **SHARE this note so that the co-signing nurse is able to place an eSignature**    Nurse 2 eSignature: Electronically signed by Guzman Richmond RN on 10/18/22 at 7:04 PM EDT

## 2022-10-18 NOTE — PLAN OF CARE
ARU PATIENT TREATMENT PLAN  [facility]  [address] [facility], [address]  [phone]      Shaji Carson    : 1960  Acct #: [de-identified]  MRN: [id number]  PHYSICIAN:  Ludivina Meléndez MD  Primary Problem    Patient Active Problem List   Diagnosis    HTN (hypertension)    DM2 (diabetes mellitus, type 2) ([facility] [address]    High cholesterol    Acute embolic stroke [facility])       Rehabilitation Diagnosis:      Left cerebral hemisphere thromboembolic infarcts: Eliquis, statin. PT/OT/SLP     Nonrheumatic aortic valve stenosis: S/p TAVR on 10/11. Eliquis     DM: A1c 8.6, Lantus 40 (80), prandial 10 (20), and SSI     DM neuropathy: no current meds     CAD: eliquis, statin     HTN: atenolol 25, lisinopril 5, HCTZ 12.5     HLD: lipitor 40     Hypokalemia: supplement    ADMIT DATE:10/18/2022    Patient Goals:  Get back to driving, get back to moving around without falling, regain independence with mobility. Admitting Impairments: Stroke - 1.2 - Right Body Involvement (Left Brain)  Activities: Impaired Eating, Hygiene, Toileting, Bathing, Dressing, Bed Mobility, Transfers, Ambulation, Stairs, and Endurance. Participation: Prior to admission patient was living at home with spouse, was independent with all mobility and activities, was an active .      CARE PLAN     NURSING:  Cristino Loaiza while on this unit will:  [] Be continent of bowel and bladder     [] Have an adequate number of bowel movements  [] Urinate with no urinary retention >300ml in bladder  [] Complete bladder protocol with shook removal  [] Maintain O2 SATs at ___%  [x] Have pain managed while on ARU       [] Be pain free by discharge   [x] Have no skin breakdown while on ARU  [] Have improved skin integrity via wound measurements  [] Have no signs/symptoms of infection at the wound site  [x] Be free from injury during hospitalization   [] Complete education with patient/family with understanding demonstrated for:  [] Adjustment   [] Other:     Nursing Interventions will include:  [] bowel/bladder training   [x] education for medical assistive devices   [] medication education   [] O2 saturation management   [] energy conservation   [] stress management techniques   [x] fall prevention   [] alarms protocol   [] seating and positioning   [] skin/wound care   [] pressure relief instruction   [] dressing changes     [] infection protection   [] DVT prophylaxis  [x] assistance with in room safety with transfers to bed, toilet, wheelchair, shower   [] bathroom activities and hygiene  [] Other:    Patient/Caregiver Education for:  [] Disease/sustained injury/management     [] Medication Use  [] Surgical intervention  [x] Safety  [x] Body mechanics and or joint protection  [x] Health maintenance     [] Other:     PHYSICAL THERAPY:  Goals:                   Patient Goals: Get back to driving, get back to moving around without falling   Short Term Goals:  Time Frame: 2 weeks  Patient will complete bed mobility at modified independent   Patient will complete transfers at [facility]   Patient will ambulate 150 ft with use of LRAD at [facility]  Patient will ascend/descend 8 stairs with (B) handrail at stand by assistance  [facility]    These goals were reviewed with this patient at the time of assessment and Fabio Herrera is in agreement.      Plan of Care: Pt to be seen 5 out of 7 days per week per ARU protocol ( 60 minutes with PT)                    OCCUPATIONAL THERAPY:  Goals:             :Short Term Goals:  Time Frame: 10-14 days  Patient will complete upper body ADL at supervision   Patient will complete lower body ADL at minimal assistance   Patient will complete toileting at minimal assistance   Patient will complete self-feeding at [facility]   Patient will complete grooming at [facility]   Patient will complete functional transfers at supervision      Long Term Goals:  Time Frame: 2-3 weeks   Patient will complete upper body ADL at modified independent   Patient will complete lower body ADL at [facility] independent   Patient will complete toileting at modified independent   Patient will complete functional transfers at [facility] independent   Patient will complete functional mobility at modified independent   Patient to gather and transport IADL items at supervision     : These goals were reviewed with this patient at the time of assessment and Juanpablo Loaiza is in agreement    Plan of Care:  Pt to be seen 5 out of 7 days per week per ARU protocol ( 60 minutes with OT)     SPEECH THERAPY: Goals will be left blank if speech is not following this patient. Goals:               Short Term Goals  Time Frame for Short Term Goals: 2 weeks  Goal 1: Pt will tolerate recommended diet and advanced trials with no overt s/s of aspiration. Goal 2: Pt will improve oral motor strength and ROM for coordinated and alternating motions, via graded tasks to improve speech back to baseline level as subjectively rated by SLP/pt and family.   Goal 3: Pt will improve executive function for multifactor task completion via graded tasks to 80% accuracy  Goal 4: Pt will improve attention to detail for graded complex information to 80%, for improved recall and retention of newly learned information              Time Frame for Short Term Goals: 2 weeks    Plan of Care:  Pt to be seen 5 out of 7 days per week per ARU protocol ( 60 minutes with SLP)    Therapy Treatments will include:  [x]  therapeutic exercises    [x]  gait training     [x]  neuromuscular re-ed                            [x]  transfer training             [x] community reintegration    [x] bed mobility                          [x]  w/c mobility and training  [x]  self care    [x]home mgmt    [x]  cognitive training            [x]  energy conservation        [x]  dysphagia tx    [x] speech/language/communication therapy   [x]  group therapy    [x]  patient/family education    [] Other:    CASE MANAGEMENT:  Goals:   Assist patient/family with discharge planning, patient/family counseling, and coordination with insurance during ARU stay. Jhon Loaiza will be seen a minimum of 3 hours of therapy per day, a minimum of 5 out of 7 days per week  (please see above for specific treatment plan per PT/OT/SLP). [] In this rare instance due to the nature of this patient's medical involvement, this patient will be seen 15 hours per week (900 minutes within a 7 day period). In addition, dietician/nutritionist may monitor calorie count as well as intake and collaboratively work with SLP on dietary upgrades. Neuropsychology/Psychology may evaluate and provide necessary support. Medical issues being managed closely and that require 24 hour availability of a physician:  [] Swallowing Precautions  [x] Bowel/Bladder Fx  [] Weight bearing precautions  [] Wound Care    [] Pain Mgmt   [] Infection Protection  [x] DVT Prophylaxis   [x] Fall Precautions  [x] Fluid/Electrolyte/Nutrition Balance  [] Voice Protection   [x] Respiratory  [] Other:    Medical Prognosis: [x] Good  [] Fair    [] Guarded   Total expected IRF days: 14  Anticipated discharge destination: Home  [] Home Independently   [x] Home with supervision - For stairs    []SNF     [] Other                                           Physician anticipated functional outcomes:  By discharge, patient will progress to being independent with all mobility and activities except requiring SBA for stairs. IPOC brief synthesis: 34-year-old male with a history of diabetes, MI, HTN, HLD, diabetic neuropathy, and nonrheumatic aortic valve stenosis who was admitted to  on 10/11 for a scheduled TAVR procedure.   The procedure was complicated by an episode of V. fib and postoperatively he was noted to have neurologic deficits and work-up revealed a thromboembolic stroke involving the left cerebral territories. Work-up including TTE was unremarkable for PFO. He was initiated on Eliquis for prophylaxis. Patient requires significant insulin at baseline with his normal home regimen of 80 units of Lantus with 20 units Humalog 3 times daily. A1c was 8.6. Due to decreased p.o. intake his regimen was adjusted. He was evaluated by therapy and suggested to continue in an inpatient setting prior to returning home. He was admitted with the above goal.        I have reviewed this initial plan of care and agree with its contents:    Title   Name    Date    Time    Physician: Electronically signed by Chi Morataya MD on 10/20/2022 at 11:24 AM      Case Mgmt: Carol Ramos, MSW, LSW, 10/19/2022 @ 8:50.     OT: Abdirizak Lobato OTR/L #[id number], 10/19/2022, 1204    PT: Myah Stanton DPT [id number] 10/19/2022 1400    RN: Amilcar Nath RN 10/18/22 19:02    ST: Carlos Santo MA CCC-SLP 10/19/2022 [id number]    :  Raheem Tirado PT, DPT [id number]  10/20/22  9:24 AM

## 2022-10-18 NOTE — PROGRESS NOTES
ARU Admission Assessment    Ethnicity  \"Are you of , /a, or [place]isian origin? \"  Check all that apply:  [x] A. No, not of , /a, or [place] Origin  [] B.  Yes, [place], [place] American, Chicano/a  [] C.  Yes, [address] [place] [address]  [] D.  Yes, [place]  [] E.  Yes, another , , or [place]ian origin  [] X. Patient unable to respond  [] Y. Patient declines to respond    Race  \"What is your race? \"  Check all that apply:  [x] A. White  [] B. Black or   [] C. American [place] [facility] or [place] Native  [] D.  [place]  [] E. [place]  [] F. Turkish  [] G. [place]  [] Juluis Belen  [] I. [place]  [] J.  Other   [] K.   [] L. Citizen of [place] or [place]  [] M. Canadian  [] N. Other [place]  [] X. Patient unable to respond  [] Y. Patient declines to respond  [] Z. None of the above    Language  A. \"What is your preferred language? \"   English    B. \"Do you need or want an  to communicate with a doctor or health care staff? \"  Check only one:  [x] 0. No  [] 1. Yes  [] 9. Unable to determine    Transportation  \"Has lack of transportation kept you from medical appointments, meetings, work, or from getting things needed for daily living? \"Check all that apply:  [] A.  Yes, it has kept me from medical appointments or from getting my medications  [] B.  Yes, it has kept me from non-medical meetings, appointments, work, or from getting things that I need  [x] C.  No  [] X. Patient unable to respond  [] Y. Patient declines to respond    Hearing  Ability to hear (with hearing aid or hearing appliances if normally used)  [x]  0. Adequate - no difficulty in normal conversation, social interaction, listening to TV  []  1. Minimal difficulty - difficulty in some environments (e.g. when person speaks softly or setting is noisy)  []  2. Moderate difficulty - speaker has to increase volume and speak distinctly   []  3.   Highly impaired - absence of useful hearing    Vision  Ability to see in adequate light (with glasses or other visual appliances)  []  0. Adequate - sees fine detail, such as regular print in newspapers/books  [x]  1. Impaired - sees large print, but not regular print in newspapers/books  []  2. Moderately impaired - limited vision; not able to see newspaper headlines but can identify objects  []  3. Highly impaired - object identification in question, but eyes appear to follow objects  []  4. Severely impaired - no vision or sees only light, colors, or shapes; eyes do not appear to follow objects    Health Literacy  \"How often do you need to have someone help you when you read instructions, pamphlets, or other written material from your doctor or pharmacy? \"  [x]  0. Never  []  1. Rarely  []  2. Sometimes  []  3. Often  []  4. Always  []  8. Patient unable to respond    BIMS - **Must be completed in the flowsheet at admission prior to proceeding with Delirium Assessment**  [x] BIMS completed in flowsheet at admission    Signs and Symptoms of Delirium  A. Acute Onset Mental Status Change - Is there evidence of an acute change in mental status from the patient's baseline? [x] 0. No  [] 1. Yes    B. Inattention - Did the patient have difficulty focusing attention, for example being easily distractible or having difficulty keeping track of what was being said? [x]  0. Behavior not present  []  1. Behavior continuously present, does not fluctuate  []  2. Behavior present, fluctuates (comes and goes, changes in severity)    C. Disorganized thinking - Was the patient's thinking disorganized or incoherent (rambling or irrelevant conversation, unclear or illogical flow of ideas, or unpredictable switching from subject to subject)? [x]  0. Behavior not present  []  1. Behavior continuously present, does not fluctuate  []  2. Behavior present, fluctuates (comes and goes, changes in severity)    D.   Altered level of consciousness - Did the patient have altered level of consciousness as indicated by any of the following criteria? Vigilant - startled easily to any sound or touch  Lethargic - repeatedly dozed off while being asked questions, but responded to voice or touch  Stuporous - very difficulty to arouse and keep aroused for the interview  Comatose - could not be aroused  [x]  0. Behavior not present  []  1. Behavior continuously present, does not fluctuate  []  2. Behavior present, fluctuates (comes and goes, changes in severity)    Mood    \"Over the last 2 weeks, have you been bothered by any of the following problems?\" 1. Symptom Presence    0 = No  1 = Yes  9 = No Response 2. Symptom Frequency    0 = Never or 1 day  1 = 2-6 days (several days)  2 = 7-11 days (half or more of the days)  3 = 12-14 days (nearly every day)  **Leave blank if 'No Reponse'**      Enter scores in boxes    Column 1 Column 2   Little interest or pleasure in doing things   0 0   Feeling down, depressed, or hopeless   0 0   **If either A or B in column 2 is coded 2 or 3, CONTINUE asking the questions below. If not, END the interview. **     Trouble falling or staying asleep, or sleeping too much       Feeling tired or having little energy       Poor appetite or overeating       Feeling bad about yourself - or that you are a failure or have let yourself or your family down       Trouble concentrating on things, such as reading the newspaper or watching television       Moving or speaking so slowly that other people could have noticed. Or the opposite- being so fidgety or restless that you have been moving around a lot more than usual.       Thoughts that you would be better off dead, or of hurting yourself in some way. Total Severity: Add scores for all frequency responses in column 2 (possible score 0-27, or enter 99 if unable to complete (if symptom frequency (column 2) is blank for 3 or more items).         Social Isolation  \"How often do you feel lonely or isolated from those around you? \"  [] 0. Never  [x] 1. Rarely  [] 2. Sometimes  [] 3. Often  [] 4. Always  [] 7. Patient declines to respond  [] 8. Patient unable to respond    Pain Effect on Sleep  \"Over the past 5 days, how much of the time has pain made it hard for you to sleep at night? \"  [x]  0. Does not apply - I have not had any pain or hurting in the past 5 days  []  1. Rarely or not at all  []  2. Occasionally  []  3. Frequently  []  4. Almost constantly  []  8. Unable to answer    **If the patient answers \"0. Does not apply\" to this question, skip the next two \"Pain Effect. Guillen Colon Guillen Colon \" questions**    Pain Interference with Therapy Activities  \"Over the past 5 days, how often have you limited your participation in rehabilitation therapy sessions due to pain? \"  []  0. Does not apply - I have not received rehabilitation therapy in the past 5 days  [x]  1. Rarely or not at all  []  2. Occasionally  []  3. Frequently  []  4. Almost constantly  []  8. Unable to answer    Pain Interference with Day-to-Day Activities: \"Over the past 5 days, how often have you limited your day-to-day activities (excluding rehabilitation therapy session)? \"  [x]  1. Rarely or not at all  []  2. Occasionally  []  3. Frequently  []  4. Almost constantly  []  8. Unable to answer    Nutritional Approaches  Check all of the following nutritional approaches that apply on admission:  []  A. Parenteral/IV feeding (including IV fluids if needed for hydration, but not as part of dialysis/chemo)  []  B. Feeding tube (e.g., nasogastric or abdominal (PEG))  []  C. Mechanically altered diet - requires change in texture of food or liquids (e.g., pureed food, thickened liquids)  [x]  D. Therapeutic diet (e.g., low salt, diabetic, low cholesterol)  []  Z.   None of the above    High Risk Drug Classes:  Use and Indication    Is taking: Check if the pt is taking any medications by pharmacological classification, not how it is used, in the following classes  Indication noted: If column 1 is checked, check if there is an indication noted for all meds in the drug class Is taking  (check all that apply) Indication noted (check all that apply)   Antipsychotic [] []   Anticoagulant [] []   Antibiotic [] []   Opioid [x] [x]   Antiplatelet [x] [x]   Hypoglycemic (including insulin) [x] [x]   None of the above []     Special Treatments, Procedures, and Programs    Check all of the following treatments, procedures, and programs that apply on admission. On admission (check all that apply)   Cancer Treatments   A1. Chemotherapy []           A2. IV []           A3. Oral []           A10. Other []   B1. Radiation []   Respiratory Therapies   C1. Oxygen Therapy []           C2. Continuous (continuously for at least 14 hours per day) []           C3. Intermittent []           C4. High-concentration []   D1. Suctioning (Does not include oral suctioning) []           D2. Scheduled []           D3. As needed []   E1. Tracheostomy Care []   F1. Invasive Mechanical Ventilator (ventilator or respirator) []   G1. Non-invasive Mechanical Ventilator []           G2. BiPAP []           G3. CPAP []   Other   H1. IV Medications (Do not include sub Q pumps, flushes, Dextrose 50% or lactated ringers) []           H2. Vasoactive medications []           H3. Antibiotics []           H4. Anticoagulation []           H10. Other []   I1. Transfusions []   J1. Dialysis []           J2. Hemodialysis []           J3. Peritoneal dialysis []   O1. IV access (including a catheter in a vein) []           O2. Peripheral [x]           O3. Midline []           O4. Central (PICC, tunneled, port) []      None of the above (select if no Cancer, Respiratory, or Other boxes are checked) []     The above items have been reviewed and updated as necessary, and are accurate for the admission assessment period.     Reviewing RN:   Beryle Cambric, RN

## 2022-10-19 LAB
ANION GAP SERPL CALCULATED.3IONS-SCNC: 13 MMOL/L (ref 3–16)
BUN BLDV-MCNC: 23 MG/DL (ref 7–20)
CALCIUM SERPL-MCNC: 9.4 MG/DL (ref 8.3–10.6)
CHLORIDE BLD-SCNC: 105 MMOL/L (ref 99–110)
CO2: 21 MMOL/L (ref 21–32)
CREAT SERPL-MCNC: 1.2 MG/DL (ref 0.8–1.3)
GFR SERPL CREATININE-BSD FRML MDRD: >60 ML/MIN/{1.73_M2}
GLUCOSE BLD-MCNC: 162 MG/DL (ref 70–99)
GLUCOSE BLD-MCNC: 178 MG/DL (ref 70–99)
GLUCOSE BLD-MCNC: 181 MG/DL (ref 70–99)
GLUCOSE BLD-MCNC: 210 MG/DL (ref 70–99)
GLUCOSE BLD-MCNC: 224 MG/DL (ref 70–99)
HCT VFR BLD CALC: 38.3 % (ref 40.5–52.5)
HEMOGLOBIN: 12.5 G/DL (ref 13.5–17.5)
MCH RBC QN AUTO: 25.7 PG (ref 26–34)
MCHC RBC AUTO-ENTMCNC: 32.5 G/DL (ref 31–36)
MCV RBC AUTO: 78.9 FL (ref 80–100)
PDW BLD-RTO: 14.3 % (ref 12.4–15.4)
PERFORMED ON: ABNORMAL
PLATELET # BLD: 129 K/UL (ref 135–450)
PMV BLD AUTO: 8.4 FL (ref 5–10.5)
POTASSIUM SERPL-SCNC: 3.9 MMOL/L (ref 3.5–5.1)
RBC # BLD: 4.86 M/UL (ref 4.2–5.9)
SODIUM BLD-SCNC: 139 MMOL/L (ref 136–145)
WBC # BLD: 7.9 K/UL (ref 4–11)

## 2022-10-19 PROCEDURE — 6370000000 HC RX 637 (ALT 250 FOR IP): Performed by: PHYSICAL MEDICINE & REHABILITATION

## 2022-10-19 PROCEDURE — 85027 COMPLETE CBC AUTOMATED: CPT

## 2022-10-19 PROCEDURE — 97535 SELF CARE MNGMENT TRAINING: CPT

## 2022-10-19 PROCEDURE — 97530 THERAPEUTIC ACTIVITIES: CPT

## 2022-10-19 PROCEDURE — 92610 EVALUATE SWALLOWING FUNCTION: CPT

## 2022-10-19 PROCEDURE — 97116 GAIT TRAINING THERAPY: CPT

## 2022-10-19 PROCEDURE — 97161 PT EVAL LOW COMPLEX 20 MIN: CPT

## 2022-10-19 PROCEDURE — 80048 BASIC METABOLIC PNL TOTAL CA: CPT

## 2022-10-19 PROCEDURE — 2580000003 HC RX 258: Performed by: PHYSICAL MEDICINE & REHABILITATION

## 2022-10-19 PROCEDURE — 36415 COLL VENOUS BLD VENIPUNCTURE: CPT

## 2022-10-19 PROCEDURE — 92523 SPEECH SOUND LANG COMPREHEN: CPT

## 2022-10-19 PROCEDURE — 97166 OT EVAL MOD COMPLEX 45 MIN: CPT

## 2022-10-19 PROCEDURE — 1280000000 HC REHAB R&B

## 2022-10-19 RX ADMIN — CYANOCOBALAMIN TAB 1000 MCG 500 MCG: 1000 TAB at 09:51

## 2022-10-19 RX ADMIN — SODIUM CHLORIDE, PRESERVATIVE FREE 10 ML: 5 INJECTION INTRAVENOUS at 13:14

## 2022-10-19 RX ADMIN — POTASSIUM CHLORIDE 20 MEQ: 1500 TABLET, EXTENDED RELEASE ORAL at 09:50

## 2022-10-19 RX ADMIN — FUROSEMIDE 20 MG: 20 TABLET ORAL at 09:50

## 2022-10-19 RX ADMIN — ATENOLOL 25 MG: 50 TABLET ORAL at 09:49

## 2022-10-19 RX ADMIN — INSULIN LISPRO 10 UNITS: 100 INJECTION, SOLUTION INTRAVENOUS; SUBCUTANEOUS at 13:14

## 2022-10-19 RX ADMIN — INSULIN LISPRO 10 UNITS: 100 INJECTION, SOLUTION INTRAVENOUS; SUBCUTANEOUS at 09:47

## 2022-10-19 RX ADMIN — CAPSAICIN: 0.25 CREAM TOPICAL at 22:22

## 2022-10-19 RX ADMIN — APIXABAN 5 MG: 5 TABLET, FILM COATED ORAL at 22:21

## 2022-10-19 RX ADMIN — LISINOPRIL 5 MG: 5 TABLET ORAL at 09:50

## 2022-10-19 RX ADMIN — SODIUM CHLORIDE, PRESERVATIVE FREE 10 ML: 5 INJECTION INTRAVENOUS at 22:21

## 2022-10-19 RX ADMIN — INSULIN LISPRO 10 UNITS: 100 INJECTION, SOLUTION INTRAVENOUS; SUBCUTANEOUS at 17:32

## 2022-10-19 RX ADMIN — HYDROCHLOROTHIAZIDE 12.5 MG: 25 TABLET ORAL at 09:50

## 2022-10-19 RX ADMIN — ATORVASTATIN CALCIUM 40 MG: 40 TABLET, FILM COATED ORAL at 22:22

## 2022-10-19 RX ADMIN — INSULIN GLARGINE 40 UNITS: 100 INJECTION, SOLUTION SUBCUTANEOUS at 22:22

## 2022-10-19 RX ADMIN — APIXABAN 5 MG: 5 TABLET, FILM COATED ORAL at 09:50

## 2022-10-19 RX ADMIN — CAPSAICIN: 0.25 CREAM TOPICAL at 09:48

## 2022-10-19 ASSESSMENT — PAIN SCALES - GENERAL: PAINLEVEL_OUTOF10: 0

## 2022-10-19 NOTE — PROGRESS NOTES
[facility] Department   Phone: [phone]    Physical Therapy    [x] Initial Evaluation            [] Daily Treatment Note         [] Discharge Summary      Patient: Fabio Herrera   : 1960   MRN: [id number]   Date of Service:  10/19/2022  Admitting Diagnosis: Acute embolic stroke [facility])  Current Admission Summary: 69-year-old male with a history of diabetes, MI, HTN, HLD, diabetic neuropathy, and nonrheumatic aortic valve stenosis who was admitted to  on 10/11 for a scheduled TAVR procedure. The procedure was complicated by an episode of V. fib and postoperatively he was noted to have neurologic deficits and work-up revealed a thromboembolic stroke involving the left cerebral territories. Work-up including TTE was unremarkable for PFO. He was initiated on Eliquis for prophylaxis. Patient requires significant insulin at baseline with his normal home regimen of 80 units of Lantus with 20 units Humalog 3 times daily. A1c was 8.6. Due to decreased p.o. intake his regimen was adjusted. He was evaluated by therapy and suggested to continue in an inpatient setting prior to returning home. He reports he is frustrated by his situation but motivated to recover. Past Medical History:  has a past medical history of Carbuncle, DM2 (diabetes mellitus, type 2) ([address] ED (erectile dysfunction), History of heart attack, HTN (hypertension), Hyperlipidemia, Peripheral neuropathy, Sleep apnea, and Wears glasses. Past Surgical History:  has a past surgical history that includes Cardiac catheterization (); Pilonidal cyst excision; cyst removal; cardiovascular stress test (2012); cardiovascular stress test (2019); back surgery; Colonoscopy (2021); and Colonoscopy (N/A, 2021).   Discharge Recommendations: home with home health PT  DME Required For Discharge: DME to be determined pending patient progress  Precautions/Restrictions: high fall risk  Weight Bearing Restrictions: weight bearing as tolerated  [] Right Upper Extremity  [] Left Upper Extremity [] Right Lower Extremity  [] Left Lower Extremity     Required Braces/Orthotics: no braces required   [] Right  [] Left  Positional Restrictions:no positional restrictions    Pre-Admission Information   Lives With: spouse    Type of Home: house  Home Layout: one level  Home Access:  5 step to enter without rails   Bathroom Layout: walker accessible, wheelchair accessible, tub only  Bathroom Equipment: hand held shower head, will use shower chair  Toilet Height: standard height- low  Home Equipment: single point cane, manual wheelchair  Transfer Assistance: Independent without use of device  Ambulation Assistance:Independent without use of device  ADL Assistance: independent with all ADL's, help with putting on socks  IADL Assistance: requires assistance with all homemaking tasks, would mow at home, handles his own medications  Active :        [x] Yes  [] No  Hand Dominance: [] Left  [x] Right  Current Employment: retired. Occupation: Engineering  Hobbies: Drive, fish, working on the car, yard work  [employer]: once a week in yard, has slipped a few times in shower    Examination   Vision:   Vision Corrective Device: wears glasses for reading  Hearing:   [facility]  Observation:   General Observation:  Pt highly motivated and in good spirits. Unable to fully  or extend R fingers, although some function was observable. Posture: Forward head, rounded shoulders  Sensation:   reports numbness and tingling in (B) UE, (B) LE due to neuropathy  Proprioception:    diminished proprioception in (B) LE  Tone:   Hypertonic in (R) UE, (R) LE   Coordination Testing:   Coordination and Movement Description: (R) UE, (R) LE Inability to move R UE through full ROM,    ROM:   (B) LE AROM WFL  R UE ROM into elbow flexion and extension, and wrist and finger flexion and extension impaired.    Strength:   (B) LE strength grossly WFL  Decision Making: low complexity  Clinical Presentation: stable      Subjective  General: Pt upright in bed on arrival.   Pain: 0/10  Pain Interventions: not applicable       Functional Mobility  Bed Mobility  Supine to Sit: minimal assistance  Rolling Right: contact guard assistance  Comments: verbal cues for sequencing, HOB flat and no bed rail  Transfers  Sit to stand transfer: contact guard assistance, minimal assistance  Stand to sit transfer: contact guard assistance, minimal assistance  Stand pivot transfer: contact guard assistance, minimal assistance  Car transfer: contact guard assistance, minimal assistance  Comments: Pt very unsteady with transfers due to loss of balance to the R. Required verbal cues to improve safety awareness. Generally inconsistent with transfers, requires multiple trials and varying levels of assistance between CGA-Amarilis with each one. Ambulation  Surface:level surface  Assistive Device: rolling walker  Other Appliance: (R), dorsiflex assist, walker splint attachment  Assistance: contact guard assistance, minimal assistance  Distance: 42'+ 20'  Gait Mechanics: Slight R foot drop, inconsistent skip speed causing fall risk, pushes walker out too far in front of him and requires assistance to keep close, decreased R step length and height, improved R heel strike with dorsiflex assist  Comments: A RW hand splint was utilized to assist R hand with gripping walker. Ace wrap was also used to assist with dorsiflexion.    Ambulation Trial 2  Surface:level surface  Assistive Device: rolling walker  Assistance: minimal assistance  Distance: 10'  Gait Mechanics: decreased R step length, R knee hyperextension during stance phase, decreased R step height  Comments:      Stair Mobility  Number of Steps: 4  Step Height: 4 inch  Hand Rails: (B) handrail  Assistance: moderate assistance  Comments: Has difficulty gripping R handrail due to weakness, LOB to the L  Wheelchair Mobility:  No w/c mobility completed on this date. Comments:  Balance  Static Sitting Balance: fair (-): maintains balance at CGA with use of UE support  Dynamic Sitting Balance: fair (-): maintains balance at CGA with use of UE support  Static Standing Balance: fair (-): maintains balance at CGA with use of UE support  Dynamic Standing Balance: fair (-): maintains balance at CGA with use of UE support  Comments: Very unsteady with transfers and ambulation. Has extensive history of falls. Other Therapeutic Interventions  Pt was assisted into a shower and helped with bathing (see OT notes). Functional Outcomes                 Cognition  WFL  Orientation:    alert and oriented x 4  Command Following:   [facility]    Education  Barriers To Learning: none  Patient Education: patient educated on goals, PT role and benefits, general safety, functional mobility training, proper use of assistive device/equipment, transfer training  Learning Assessment:  patient verbalizes and demonstrates understanding    Assessment  Activity Tolerance: decreased endurance  Impairments Requiring Therapeutic Intervention: decreased functional mobility, decreased ADL status, decreased ROM, decreased strength, decreased safety awareness, decreased endurance, decreased sensation, decreased balance  Prognosis: good  Clinical Assessment: Pt is a 60year old male presenting with impaired functional mobility and endurance secondary to being s/p post acute embolic stroke ([facility] [address] Pt also presents with impaired function of R UE including strength and ROM deficits that are affecting his ability to complete ADLs and functional mobility without limitations. Pt also presents as a high fall risk due to current presentation along with his extensive history of falls. Pt requires skilled ARU physical therapy services in order to address deficits and promote independence to allow the patient to safely succeed in his home environment.    Safety Interventions: patient left in chair, chair alarm in place, call light within reach, and patient at risk for falls    Plan  Frequency: 5 x/week, 90 min/day  Current Treatment Recommendations: strengthening, ROM, balance training, functional mobility training, transfer training, gait training, stair training, endurance training, neuromuscular re-education, ADL/self-care training, and home management training    Goals  Patient Goals: Get back to driving, get back to moving around without falling   Short Term Goals:  Time Frame: 2 weeks  Patient will complete bed mobility at modified independent   Patient will complete transfers at [facility]   Patient will ambulate 150 ft with use of LRAD at [facility]  Patient will ascend/descend 8 stairs with (B) handrail at stand by assistance  Patient will complete car transfer at [facility], modified independent    Therapy Session Time      Individual Group Co-treatment   Time In      830   Time Out      945   Minutes      75     Timed Code Treatment Minutes:   60  Total Treatment Minutes:  75       Electronically Signed By: Jazzy Roche, PT    [address] [facility]  Therapist was present, directed the patient's care, made skilled judgement, and was responsible for assessment and treatment of the patient. Co-signed and supervised by:  Jazzy Roche [id number]

## 2022-10-19 NOTE — PROGRESS NOTES
[facility] Department   Phone: [phone]    Speech Therapy    [x] Initial Evaluation            [] Daily Treatment Note         [] Discharge Summary      Patient: Cierra Hurley   : 1960   MRN: [id number]   Date of Service:  10/19/2022  Admitting Diagnosis: Acute embolic stroke [facility])  Current Admission Summary: 76-year-old male with a history of diabetes, MI, HTN, HLD, diabetic neuropathy, and nonrheumatic aortic valve stenosis who was admitted to  on 10/11 for a scheduled TAVR procedure. The procedure was complicated by an episode of V. fib and postoperatively he was noted to have neurologic deficits and work-up revealed a thromboembolic stroke involving the left cerebral territories. Work-up including TTE was unremarkable for PFO. He was initiated on Eliquis for prophylaxis. Patient requires significant insulin at baseline with his normal home regimen of 80 units of Lantus with 20 units Humalog 3 times daily. A1c was 8.6. Due to decreased p.o. intake his regimen was adjusted. He was evaluated by therapy and suggested to continue in an inpatient setting prior to returning home. He reports he is frustrated by his situation but motivated to recover. Past Medical History:  has a past medical history of Carbuncle, DM2 (diabetes mellitus, type 2) ([address] ED (erectile dysfunction), History of heart attack, HTN (hypertension), Hyperlipidemia, Peripheral neuropathy, Sleep apnea, and Wears glasses. Past Surgical History:  has a past surgical history that includes Cardiac catheterization (); Pilonidal cyst excision; cyst removal; cardiovascular stress test (2012); cardiovascular stress test (2019); back surgery; Colonoscopy (2021); and Colonoscopy (N/A, 2021).   Precautions/Restrictions: high fall risk, up as tolerated  Instrumental Swallow Study: FEES at outside hospital completed on 10/12/2022      Pre-Admission Information   Living Status: Pt lives with his significant other Benedict Covarrubias, a dog and cat. 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 Pt with intermittent emotional lability (new s/p CVA). Pain: Denies    Safety Interventions: patient left in chair, chair alarm in place, and family/caregiver present      Dysphagia Bedside Swallow Evaluation     Prior Dysphagia History: Pt seen at other facility with recommended diet of regular texture with thin liquid, avoid straws. Patient Complaint: Right sided weakness   Patient Positioning:   Upright in chair  Respiratory Status:   Room air  Pre-Evaluation Consistency Recommendation:   Regular texture diet  with Thin liquids    Dentition:   Edentulous   Oral Hygiene:   Moist   Baseline Vocal Quality:   normal   Volitional Cough:   strong  Volitional Swallow:   Adequate   Oral Mechanism Exam:   Impairment Severity:Mild   Labial ROM, Labial Symmetry , Labial Strength  Oral Phase:   Impairment Severity:Mild   Impaired mastication   Pharyngeal Phase:   Impairment Severity:Mild   Suspect Delayed swallow initiation  and Cough with thins via straw   Eating Assistance:   Setup or clean-up assistance     Dysphagia  Impressions:  Pt upright in chair on room air. OME (oral motor exam) revealed R sided labial weakness and reduced ROM/ Coordination. Trails of thin liquid via straw and regular solids provided to assess swallow function ad safety. Oral phase characterized by prolonged mastication with regular solids secondary to edentulous. Pt denies pocketing or difficulties with mastication due to R sided labial weakness and reduced ROM/coordination.  Pharyngeal phase characterized by suspected delayed swallow initiation with adequate laryngeal elevation via manual palpation. Pt with immediate cough following thin liquid via straw. Pt recalled recommendation from previous speech at other facility to avoid straws. Based on swallow assessment recommend regular texture diet with thin liquids (no straws) and diet tolerance monitoring to ensure continued safety. Speech Language Cognitive Evaluation     Patient complaint: Changes in speech     Oral Mechanism Exam:    Impairment Severity:Mild   Labial ROM, Labial Symmetry , Labial Strength  Comprehension  Auditory Comprehension:   Impairment Severity:Within functional limits      Reading Comprehension:   Impairment Severity: To be assessed   Expression  Primary Mode of Expression:   Verbal  Verbal Expression:   Impairment Severity:Within functional limits      Written Expression:   Impairment Severity: To be assessed   Pragmatics/Social Functioning:   Impairment Severity:Mild   Emotionally labile  Motor Speech  Impairment Severity:Moderate   Dysarthria: Decreased rate  and Imprecise articulation    Voice  Impairment Severity:Within functional limits      Cognition   [] Unable to be assessed secondary to Aphasia     Overall Orientation:   Impairment Severity:Within functional limits   Oriented x4  Attention:   Impairment Severity:Mild   Impaired Alternating Attention  Memory:   Impairment Severity:Mild   Impaired Short-term Memory  Impaired Immediate/working Memory  Problem Solving:   Impairment Severity:Within functional limits      Safety/Judgement:   Impairment Severity:Within functional limits        Additional Assessment   Pt was assessed using the Brief Cognitive Assessment Tool (BCAT).      COGNITIVE DOMAIN Pt Score Points Possible  COMMENTS   ORIENTATION  6 6    IMMEDIATE VERBAL RECALL 4 4    VISUAL RECOGNITION/NAMING 2 3    ATTENTION 5 7    ABSTRACTION DNT 3    LANGUAGE DNT 3    EXECUTIVE  2 4    VISUOSPATIAL  3 4 Numbers written backwards    DELAYED VERBAL RECALL 0 4 4/4 w/ category cue    IMMEDIATE STORY RECALL 2 2    DELAYED VISUAL MEMORY  2 3    DELAYED STORY RECALL 1 2    STORY RECOGNITION  5 5    SUM 32/44 50      Composite score not calculated as entire assessment was not completed. Please see domain scores above. Education  Barriers To Learning: none  Patient Education: Provided education regarding role of SLP, results of assessment, recommendations and general speech pathology plan of care. Learning Assessment: Pt verbalized understanding     Assessment  Impairments Requiring Therapeutic Intervention: Dysarthria , Cognitive-Linguistic Deficits , and Oropharyngeal Dysphagia   Prognosis: good    Clinical Assessment:  Pt presents with mild dysarthria, cognitive-linguistic deficits, and oropharyngeal dysphagia. Pt motor speech characterized by reduced R sided labial ROM, coordination, strength and symmetry contributing to decreased rate of speech with imprecise articulation. Deficits contribute to infrequent mild intelligibility. Pt with good awareness of deficits. Cognitive-linguistic deficits characterized by impaired short term recall, immediate/working memory, and alternating attention. Visuospatial errors during clock drawing characterized by numbers being drawn backwards or upside down. Error did not impact placement of numbers or ability to draw correct time. Recommend skilled speech intervention to help pt return to prior level of communication and cognitive functioning and to increase level of independence at d/c.              Diet Solids Recommendation:   Liquid Consistency Recommendation:   Recommended Form of Meds:   Regular texture diet     Thin liquids     Meds whole with water       Recommended Compensatory Swallowing Strategies: Upright as possible with all PO intake , No straws , Small bites/sips , Eat/feed slowly, Remain upright 30-45 min       Plan  Frequency: 60 minutes/day; 5 days per week, as tolerated, until goals met, or discharged from ARU. Therapeutic Interventions: Diet Tolerance Monitoring  Speech / Motor Planning / Voice intervention  and Cognitive-Linguistic intervention   Discharge Recommendations: Home independently  Continued SLP at Discharge: TBD based upon progress     Goals  Patient Goals: \"improve my speech\"   Time Frame: 2 weeks     Pt will tolerate recommended diet and advanced trials with no overt s/s of aspiration. Pt will improve oral motor strength and ROM for coordinated and alternating motions, via graded tasks to improve speech back to baseline level as subjectively rated by SLP/pt and family. Pt will improve executive function for multifactor task completion via graded tasks to 80% accuracy  Pt will improve attention to detail for graded complex information to 80%, for improved recall and retention of newly learned information       Therapy Session Time      Session 1 Session 2   Time In 0945    Time Out 1045    Time Code Minutes 0    Individual Minutes 60      Timed Code Treatment Minutes:  0  Total Treatment Minutes:  60    Electronically Signed By:  Bro Andrews M.A. [facility] GIL [id number]  Speech-Language Pathologist   10/19/2022 10:52 AM     The speech-language pathologist was present, directed the patient's care, made skilled judgment and was responsible for assessment and treatment.     Emer[place] SRAAH,  Speech-Language Pathology

## 2022-10-19 NOTE — PATIENT CARE CONFERENCE
[facility]  Inpatient Rehabilitation  Weekly Team Conference Note    Patient Name: Yoel Sampson        MRN: [id number]    : 1960  (60 y.o.)  Gender: male           The team conference for this patient was held on 10/20/22 at 11:00am and led by:  Christina Jimenez MD    CASE MANAGEMENT:  Assessment:   Patient lives at home with his girl-friend. Patient is a  and has a PCP @ the [facility]. While in ARU patient participating with PT/OT/SLP to promote independence so that patient can safely discharge to home with needed supports. PHYSICAL THERAPY:    Bed Mobility  Supine to Sit: minimal assistance  Rolling Right: contact guard assistance  Comments: verbal cues for sequencing, HOB flat and no bed rail  Transfers  Sit to stand transfer: contact guard assistance, minimal assistance  Stand to sit transfer: contact guard assistance, minimal assistance  Stand pivot transfer: contact guard assistance, minimal assistance  Car transfer: contact guard assistance, minimal assistance  Comments: Pt very unsteady with transfers due to loss of balance to the R. Required verbal cues to improve safety awareness. Generally inconsistent with transfers, requires multiple trials and varying levels of assistance between CGA-Amarilis with each one. Ambulation  Surface:level surface  Assistive Device: rolling walker  Other Appliance: (R), dorsiflex assist, walker splint attachment  Assistance: contact guard assistance, minimal assistance  Distance: 42'+ 20'  Gait Mechanics: Slight R foot drop, inconsistent skip speed causing fall risk, pushes walker out too far in front of him and requires assistance to keep close, decreased R step length and height, improved R heel strike with dorsiflex assist  Comments: A RW hand splint was utilized to assist R hand with gripping walker. Ace wrap was also used to assist with dorsiflexion.    Ambulation Trial 2  Surface:level surface  Assistive Device: rolling walker  Assistance: minimal assistance  Distance: 10'  Gait Mechanics: decreased R step length, R knee hyperextension during stance phase, decreased R step height  Comments:       Stair Mobility  Number of Steps: 4  Step Height: 4 inch  Hand Rails: (B) handrail  Assistance: moderate assistance  Comments: Has difficulty gripping R handrail due to weakness, LOB to the L    QM:  Roll Left and Right  Assistance Needed: Partial/moderate assistance  CARE Score: 3  Discharge Goal: Independent  Sit to Lying  Assistance Needed: Partial/moderate assistance  CARE Score: 3  Discharge Goal: Independent  Lying to Sitting on Side of Bed  Assistance Needed: Partial/moderate assistance  CARE Score: 3  Discharge Goal: Independent  Sit to Stand  Assistance Needed: Partial/moderate assistance  CARE Score: 3  Discharge Goal: Independent  Chair/Bed-to-Chair Transfer  Assistance Needed: Partial/moderate assistance  CARE Score: 3  Discharge Goal: Independent  Car Transfer  Assistance Needed: Partial/moderate assistance  CARE Score: 3  Discharge Goal: Independent  Walk 10 Feet  Assistance Needed: Partial/moderate assistance  CARE Score: 3  Discharge Goal: Independent  Walk 50 Feet with Two Turns  Assistance Needed: Partial/moderate assistance  CARE Score: 3  Discharge Goal: Independent  Walk 150 Feet  Reason if not Attempted: Not attempted due to medical condition or safety concerns  CARE Score: 88  Discharge Goal: Independent  Walking 10 Feet on Uneven Surfaces  Reason if not Attempted: Not attempted due to medical condition or safety concerns  CARE Score: 88  Discharge Goal: Independent  1 Step (Curb)  Assistance Needed: Partial/moderate assistance  CARE Score: 3  Discharge Goal: Supervision or touching assistance  4 Steps  Assistance Needed: Partial/moderate assistance  CARE Score: 3  Discharge Goal: Supervision or touching assistance  12 Steps  Assistance Needed: Partial/moderate assistance  CARE Score: 3  Discharge Goal: Supervision or touching assistance  Picking Up Object  Reason if not Attempted: Not attempted due to medical condition or safety concerns  CARE Score: 88  Discharge Goal: Independent  Wheelchair Ability  Uses a Wheelchair and/or Scooter?: No    Goals:                   Patient Goals: Get back to driving, get back to moving around without falling   Short Term Goals:  Time Frame: 2 weeks  Patient will complete bed mobility at modified independent   Patient will complete transfers at [facility]   Patient will ambulate 150 ft with use of LRAD at [facility]  Patient will ascend/descend 8 stairs with (B) handrail at stand by assistance  Patient will complete car transfer at Independent, modified independent               These goals were reviewed with this patient at the time of assessment and Nataly Veliz is in agreement. Plan of Care: Pt to be seen 5 out of 7 days per week per ARU protocol ( 60 minutes with PT)                     SPEECH THERAPY:    Diet Level:ADULT DIET; Regular; 4 carb choices (60 gm/meal)    Assessment: Impressions  Diagnosis: Pt presents with mild dysarthria, cognitive-linguistic deficits, and oropharyngeal dysphagia. Pt motor speech characterized by reduced R sided labial ROM, coordination, strength and symmetry contributing to decreased rate of speech with imprecise articulation. Deficits contribute to infrequent mild intelligibility. This date, pt tolerated vital stim to target oral symmetry, labial and buccal strength, ROM and coordination and oral phase deficits including improvement with oral containment and ease of mastication. Pt with good awareness of deficits. Cognitive-linguistic deficits characterized by impaired short term recall, immediate/working memory, and attention to detail. Pt demonstrates good awareness of deficits and is able to self correct cognitive errors >50% of time.  Recommend ongoing skilled speech intervention to help pt return to prior level of communication and cognitive functioning and to increase level of independence at d/c. Goals:               Short Term Goals  Time Frame for Short Term Goals: 2 weeks  Goal 1: Pt will tolerate recommended diet and advanced trials with no overt s/s of aspiration. ongoing  Goal 2: Pt will improve oral motor strength and ROM for coordinated and alternating motions, via graded tasks to improve speech back to baseline level as subjectively rated by SLP/pt and family. ongoing  Goal 3: Pt will improve executive function for multifactor task completion via graded tasks to 80% accuracy. ongoing  Goal 4: Pt will improve attention to detail for graded complex information to 80%, for improved recall and retention of newly learned information.  ongoing              Time Frame for Short Term Goals: 2 weeks    Plan of Care:  Pt to be seen 5 out of 7 days per week per ARU protocol ( 60 minutes with SLP)    OCCUPATIONAL THERAPY:    ADL:   Grooming: minimal assistance  Grooming Comments: oral care w/c level- assist to open mouth wash container   Upper Extremity Bathing: moderate assistance  Lower Extremity Bathing: maximum assistance   Bathing Comments: assist to wash/dry R UE, assist for thoroughness of L UE; assist for LE blow knee<>feet + posterior roberto care  Upper Extremity Dressing: moderate assistance  Lower Extremity Dressing: dependent  Dressing Comments: assist to thread shirt on R UE and adjust down trunk; TD for socks, assist to thread feet into pants/brief, pt able to assist in ankle>over knees of clothing only, assist for clothing over hips in stance + balance     Toilet/ShowerTransfers: Baron w/ RW, VC for sequence and safety     QM:  Eating  Assistance Needed: Setup or clean-up assistance  CARE Score: 5  Discharge Goal: Independent  Oral Hygiene  Assistance Needed: Supervision or touching assistance  CARE Score: 4  Discharge Goal: [address] needed: Partial/moderate assistance  CARE Score: 3  Discharge Goal: Independent  Toilet Transfer  Assistance needed: Partial/moderate assistance  CARE Score: 3  Discharge Goal: Independent  Shower/Bathe Self  Assistance Needed: Substantial/maximal assistance  CARE Score: 2  Discharge Goal: Independent  Upper Body Dressing  Assistance Needed: Partial/moderate assistance  CARE Score: 3  Discharge Goal: Independent  Lower Body Dressing  Assistance Needed: Dependent  CARE Score: 1  Discharge Goal: Independent  Putting On/Taking Off Footwear  Assistance Needed: Dependent  CARE Score: 1  Discharge Goal: Independent    Goals:               :Short Term Goals:  Time Frame: 10-14 days  Patient will complete upper body ADL at supervision   Patient will complete lower body ADL at minimal assistance   Patient will complete toileting at minimal assistance   Patient will complete self-feeding at modified independent   Patient will complete grooming at modified independent   Patient will complete functional transfers at supervision      Long Term Goals:  Time Frame: 2-3 weeks   Patient will complete upper body ADL at modified independent   Patient will complete lower body ADL at modified independent   Patient will complete toileting at modified independent   Patient will complete functional transfers at modified independent   Patient will complete functional mobility at modified independent   Patient to gather and transport IADL items at supervision    : These goals were reviewed with this patient at the time of assessment and Shruti Loaiza is in agreement    Plan of Care:  Pt to be seen 5 out of 7 days per week per ARU protocol ( 60 minutes with OT)     NUTRITION:  Weight: 273 lb 12.8 oz (124.2 kg) / Body mass index is 39.29 kg/m². Diet Order: 4 CCC  Supplements: None    Pt has consistent PO intake of % of meals and weight is stable. Please see nutrition note for details.     NURSING:    Risk for Readmission: 10%    Fox Fall Risk Score: high  Wounds/Incisions/Ulcers: None  Medication Review: Reviewed daily with patient  Pain: denies pain  Consultations/Labs/X-rays: Labs Monday and Thursday    Patient/Family Education provided by team:    Discharge Plan   Estimated Length of Stay:12 days  Destination: Home  Pass:No  Services at Discharge: [facility] OT/PT S3  Equipment at Discharge: TBD pending progress   Factors facilitating achievement of predicted outcomes: Family support, Motivated, and Sense of humor  Barriers to the achievement of predicted outcomes: residual deficits and r-side weakness s/p CVA    Patient Goals:  Get back to driving, get back to moving around without falling, regain IND with mobility      Interdisciplinary Individualized Plan of Care Review of Previous Week:    Medical and functional progress towards goals:  Medically doing well, labs and vitals good, will decreased incidence of lab draws, IV to come out, will increase insulin as pt eats more. Progressing with ambulation with walker, will attempt ambulation without AD, cues and sequencing required for Right side with ADLs, high level thinking and attention still limited, SLP focusing on improving, SLP also working with pt on vital stim due to weak swallowing musculature. Barriers towards progress:  Right sided weakness and coordination  Interventions to address Barriers:  PT/OT focus on right sided neuromuscular re-education and strengthening  Goals still appropriate:  Yes  Modifications to goals: None  Continue Current Plan of Care:  Yes  Modifications to Plan of Care: None    Rehab Team Members in attendance for Team Conference:  Kurt Aaron, MSW, LSW    Mariaelena Griffith RD, LD    Ish Aldrich, OTR/L    Marcus Rice, PT, DPT    Luci Zuniga M.A., AICHA Wells PT, DPT,     I approve the established interdisciplinary plan of care as documented within the medical record of [facility].     Shayla Malcolm MD Electronically signed by More Meyers MD on 10/20/2022 at 11:24 AM

## 2022-10-19 NOTE — PROGRESS NOTES
Nutrition Note    RECOMMENDATIONS  No nutrition rec's @ this time. Monitor for diet education needs prior to d/c if pt receptive. NUTRITION ASSESSMENT   Pt is s/p acute embolic stroke. Receives a [address] diet & is eating % of meals so far. No wt loss per EMR. Skin is intact. Will monitor for diet education needs prior to d/c. No nutrition concerns expressed @ this time. Nutrition Related Findings: Gluc 181; +2 nonpitting edema RUE/RLE  Wounds: None  Nutrition Education:  Education not indicated   Nutrition Goals: PO intake 75% or greater     MALNUTRITION ASSESSMENT      Malnutrition Status: No malnutrition    NUTRITION DIAGNOSIS   No nutrition diagnosis at this time     CURRENT NUTRITION THERAPIES  ADULT DIET; Regular; 4 carb choices (60 gm/meal)     PO Intake: %   PO Supplement Intake:None Ordered    ANTHROPOMETRICS  Current Height: 5' 10\" (177.8 cm)  Current Weight: 273 lb 12.8 oz (124.2 kg)    Ideal Body Weight (IBW): 166 lbs  (75 kg)        BMI: 39.2      The patient will be monitored per nutrition standards of care. Consult dietitian if additional nutrition interventions are needed prior to RD reassessment.      Jonathan Sue RD, LD    Contact: [phone]

## 2022-10-19 NOTE — H&P
drugs: Marijuana (Weed) and Cocaine. family history includes Cancer in his father and mother; Oralia Stefan in his father and mother.     Allergies: Tradjenta [linagliptin] and Codeine    Current Facility-Administered Medications   Medication Dose Route Frequency Provider Last Rate Last Admin    acetaminophen (TYLENOL) tablet 650 mg  650 mg Oral Q4H PRN Harish Otto MD        apixaban Harriet Nehemiah) tablet 5 mg  5 mg Oral BID Harish Otto MD   5 mg at 10/19/22 0950    capsaicin (ZOSTRIX) 0.025 % cream   Topical BID Harish Otto MD   Given at 10/19/22 0948    insulin lispro (HUMALOG) injection vial 10 Units  10 Units SubCUTAneous TID WC Harish Otto MD   10 Units at 10/19/22 0947    melatonin tablet 6 mg  6 mg Oral Nightly PRN Harish Otto MD        atorvastatin (LIPITOR) tablet 40 mg  40 mg Oral Nightly Harish Otto MD   40 mg at 10/18/22 2027    methocarbamol (ROBAXIN) tablet 500 mg  500 mg Oral TID PRN Harish Otto MD        insulin glargine (LANTUS) injection vial 40 Units  40 Units SubCUTAneous Nightly Harish Otto MD   40 Units at 10/18/22 2029    atenolol (TENORMIN) tablet 25 mg  25 mg Oral Daily Harish Otto MD   25 mg at 10/19/22 6222    vitamin B-12 (CYANOCOBALAMIN) tablet 500 mcg  500 mcg Oral Every Other Day Harish Otto MD   500 mcg at 10/19/22 0951    furosemide (LASIX) tablet 20 mg  20 mg Oral Every Other Day Harish Otto MD   20 mg at 10/19/22 0950    lisinopril (PRINIVIL;ZESTRIL) tablet 5 mg  5 mg Oral Daily Harish Otto MD   5 mg at 10/19/22 0950    hydroCHLOROthiazide (HYDRODIURIL) tablet 12.5 mg  12.5 mg Oral Daily Harish Otto MD   12.5 mg at 10/19/22 0950    potassium chloride (KLOR-CON M) extended release tablet 20 mEq  20 mEq Oral Daily with breakfast Harish Otto MD   20 mEq at 10/19/22 0950    diphenhydrAMINE (BENADRYL) tablet 25 mg  25 mg Oral Q6H PRN Harish Otto MD        hydrALAZINE (APRESOLINE) tablet 50 mg  50 mg Oral Q8H PRN Cindy Grififn MD        ondansetron (ZOFRAN-ODT) disintegrating tablet 4 mg  4 mg Oral Q8H PRN Cindy Griffin MD        traZODone (DESYREL) tablet 50 mg  50 mg Oral Nightly PRN Cindy Griffin MD        traMADol Renaldo Marcus) tablet 50 mg  50 mg Oral Q6H PRN Cindy Griffin MD        dextrose bolus 10% 125 mL  125 mL IntraVENous PRN Cindy Griffin MD        Or    dextrose bolus 10% 250 mL  250 mL IntraVENous PRN Cindy Griffin MD        glucagon (rDNA) injection 1 mg  1 mg IntraMUSCular PRN Cindy Griffin MD        dextrose 10 % infusion   IntraVENous Continuous PRN Cindy Griffin MD        insulin lispro (HUMALOG) injection vial 0-8 Units  0-8 Units SubCUTAneous TID WC Cindy Griffin MD        insulin lispro (HUMALOG) injection vial 0-4 Units  0-4 Units SubCUTAneous Nightly Cindy Griffin MD        sodium chloride flush 0.9 % injection 5-40 mL  5-40 mL IntraVENous BID Cindy Griffin MD   10 mL at 10/18/22 2037       REVIEW OF SYSTEMS:   CONSTITUTIONAL: negative for fevers, chills, diaphoresis, appetite change, night sweats and unexpected weight change. EYES: negative for blurred vision, eye discharge, visual disturbance and icterus. HEENT: negative for hearing loss, tinnitus, ear drainage, sinus pressure, nasal congestion, and epistaxis. RESPIRATORY: Negative for hemoptysis, cough, sputum production. CARDIOVASCULAR: negative for chest pain, palpitations, exertional chest pressure/discomfort, edema, syncope. GASTROINTESTINAL: negative for nausea, vomiting, diarrhea, constipation, blood in stool and abdominal pain. GENITOURINARY: negative for frequency, dysuria, urinary incontinence, decreased urine volume, and hematuria. HEMATOLOGIC/LYMPHATIC: negative for easy bruising, bleeding and lymphadenopathy. ALLERGIC/IMMUNOLOGIC: negative for recurrent infections, angioedema, anaphylaxis and drug reactions.    ENDOCRINE: negative for weight changes and diabetic symptoms including polyuria, polydipsia and polyphagia. MUSCULOSKELETAL: negative for pain, joint swelling, decreased range of motion and muscle weakness. NEUROLOGICAL: negative for headaches. Positive slurred speech, unilateral weakness. PSYCHIATRIC/BEHAVIORAL: negative for hallucinations, behavioral problems, confusion and agitation. All pertinent positives are noted in the HPI. Physical Examination:  Vitals: Patient Vitals for the past 24 hrs:   BP Temp Temp src Pulse Resp SpO2 Height Weight   10/19/22 1000 -- -- -- 65 -- -- -- --   10/19/22 0910 135/77 97.9 °F (36.6 °C) Oral 65 18 95 % -- --   10/18/22 2015 (!) 139/54 98.1 °F (36.7 °C) Oral 61 18 94 % -- --   10/18/22 1809 138/72 97.9 °F (36.6 °C) Oral 68 18 -- -- --   10/18/22 1753 -- -- -- -- -- -- 5' 10\" (1.778 m) 273 lb 12.8 oz (124.2 kg)   10/18/22 1725 138/72 -- -- 68 -- -- -- --     Psych: Stable mood, normal judgement, normal affect   Const: No distress  Eyes: Conjunctiva noninjected, no icterus noted; pupils equal, round. HENT: Atraumatic, normocephalic; Oral mucosa moist  Neck: Trachea midline, neck supple. No thyromegaly noted. CV: Regular rate and rhythm, no murmur rub or gallop noted  Resp: Lungs clear to auscultation bilaterally, no rales wheezes or ronchi, no retractions. Respirations unlabored. GI: Soft, nontender, nondistended. Normal bowel sounds. No palpable masses. Neuro: Alert, oriented, appropriate. Right facial droop with flaccid dysarthria. No other cranial nerve deficits appreciated. Sensation intact to light touch. Motor examination reveals: LUE 5/5 LLE 5/5 RUE 4/5 SA EF 4-/5 EE 3/5  RLE 4/5 HF/KE/KF 2/5 DF. Reflexes decreased on right  Skin: Normal temperature and turgor  MSK: No joint abnormalities noted. Ext: No significant edema appreciated. No varicosities.     Lab Results   Component Value Date    WBC 7.9 10/19/2022    HGB 12.5 (L) 10/19/2022    HCT 38.3 (L) 10/19/2022    MCV 78.9 (L) 10/19/2022     (L) 10/19/2022     No results found for: INR, PROTIME  Lab Results   Component Value Date    CREATININE 1.2 10/19/2022    BUN 23 (H) 10/19/2022     10/19/2022    K 3.9 10/19/2022     10/19/2022    CO2 21 10/19/2022     Lab Results   Component Value Date    ALT 27 2021    AST 28 2021    ALKPHOS 62 2021    BILITOT 0.4 2021       Most recent echocardiogram revealed   Transthoracic Echocardiography    Patient: Lexus Guo  MR #: [id number]  Account:  Study Date: 10/12/2022  Gender: M  Age: 60  : 1960  Room: Sunny Vogt MD  SONOGRAPHER Marcia Frazier MD  REFERRING Arnulfo Liang MD  CONSULTING Team,  Neurology Consult  Nel Diallo, MD Albaro Summers MD    -------------------------------------------------------------------    Procedure:ECHO 2D COMPLETE W/CONTRAST (TTE) Order: Accession SEFERINO:[id number]    -------------------------------------------------------------------  Indications: Afib with RVR (I48.0). One day post TAVR.    -------------------------------------------------------------------  Risk factors: PMHx of CAD, HTN, HLD, DM, neuropathy, obesity who  was seen at [facility] for TAVR placement and subsequently developed  V-fib during procedure on 10/11.    -------------------------------------------------------------------  Study data: Height: 71in. 180.3cm. Weight: 287.4lb. 130.6kg. Comparison was made to the study of 10/11/2022. Study status:  Routine. Procedure: Transthoracic echocardiography. Image quality  was adequate. Scanning was performed from the parasternal, apical,  and subcostal acoustic windows. Intravenous contrast Optison and  agitated saline was administered. Transthoracic  echocardiography. M-mode, complete 2D, complete spectral Doppler,  color Doppler, and intravenous contrast injection. Birthdate:  Patient YOB: 1960. Age: Patient is 62yr old. Sex:  Birth gender: male.  Body mass index: BMI: 40.2kg/m^2. Body  surface area: BSA: 2.46m^2. Blood pressure: 123/57 Patient  status: Inpatient. Study date: Study date: 10/12/2022. Study  time: 11:05 AM. Location: Bedside. -------------------------------------------------------------------  Study Conclusions    - Left ventricle: Wall thickness was increased in a pattern of moderate LVH. Systolic function was  normal. The estimated ejection fraction was in the range of 55% to 60%. Doppler parameters are  consistent with abnormal left ventricular relaxation (grade 1 diastolic dysfunction). - Aortic valve: There is a bioprosthetic valve(TAVR). The prosthesis is not well visualised but has  a normal range of motion. The sewing ring appears normal, has no rocking motion, and shows no  evidence of dehiscence. No significant perivalvular regurgitation.  - Mitral valve: Mildly calcified annulus. Mild thickening. The findings are consistent with mild  stenosis. - Left atrium: The atrium is dilated. - Right ventricle: Systolic function was normal by visual assessment.  - Pulmonic valve: The peak systolic gradient is 7mm Hg. Impressions:    - No significant change compared to prior echo(10/11/22)  - Limited echo for LV/RV/AV function. Most recent imaging studies revealed   10/12/2022    Formatting of this note might be different from the original.  EXAM: MRI BRAIN WO CONTRAST     INDICATION: Stroke, follow up    TECHNIQUE: MRI brain performed including multiplanar T1 and T2 weighted sequences. COMPARISON: Prior head CT dated October 11, 2022    FINDINGS:    Adequate diagnostic quality. Brain parenchyma: Small 10 mm area of diffusion restriction is noted within the anterior left midbrain/upper yefri (series 5, image 14). Additional punctate areas of diffusion restriction is seen within the cerebellar hemispheres, left medial temporal occipital junction, left occipital and left parietal cortex.  There is T2/FLAIR hyperintense signal in the left midbrain and yefri. A focus of remote microhemorrhage in the right centrum semiovale bilaterally. Normal pituitary gland, optic chiasm, and cerebellar tonsils. Ventricles and extraaxial spaces: Proportionate enlargement of sulci and ventricles. No extra-axial fluid collection. Marrow signal of calvarium and skull base: Normal.    Orbits, paranasal sinuses, and mastoid regions: Prior bilateral cataract surgery. Minimal paranasal sinus mucosal thickening. Small mucus retention cyst within the right maxillary sinus. Aerated mastoid air cells. Vascular structures: Normal arterial and venous flow voids. Other: Normal included extracranial structures. Upper cervical spine without significant abnormality. IMPRESSION:     1. Acute infarct of the anterior left midbrain/upper yefri, punctate acute infarct of the cerebral hemispheres, left medial temporal occipital junction, and the left parietal and occipital cortex, concerning for thromboembolic event. 2. No intracranial mass, signal abnormality, or acute hemorrhage. The above laboratory data have been reviewed. The above imaging data have been reviewed. The above medical testing have been reviewed. Body mass index is 39.29 kg/m². Barriers to Discharge: Right hemiparesis, decreased safety, decreased endurance, ADLs    Disposition: Home    Prognosis: Fair    Rehabilitation goals: To return patient to home setting at their prior level of function. POST ADMISSION PHYSICIAN EVALUATION  The patient has agreed to being admitted to our comprehensive inpatient  rehabilitation facility consisting of at least 180 minutes of therapy a day,  5 out of 7 days a week. The patient/family has a good understanding of our discharge process.  The  patient has potential to make improvement and is in need of at least two of  the following multidisciplinary therapies including but not limited to  physical, occupational, respiratory, and speech, nutritional services, wound care, and prosthetics and orthotics. Given the patients complex condition  and risk of further medical complications, rehabilitation services cannot be  safely provided at a lower level of care such as a skilled nursing facility. I have compared the patients medical and functional status at the time of the  preadmission screening and the same on this date, and there are no significant changes except as documented below in the assessment and plan. By signing this document, I acknowledge that I have personally performed a  full physical examination on this patient within 24 hours of admission to  this inpatient rehabilitation facility and have determined the patient to be  able to tolerate the above course of treatment at an intensive level for a  reasonable period of time. I will be completing a detailed individualized  Plan of Care for this patient by day four of the patients stay based upon the  Preadmission Screen, this Post-Admission Evaluation, and the therapy  evaluations. Assessment and Plan:  Left cerebral hemisphere thromboembolic infarcts: Eliquis, statin. PT/OT/SLP    Nonrheumatic aortic valve stenosis: S/p TAVR on 10/11. Eliquis    DM: A1c 8.6, Lantus 40 (80), prandial 10 (20), and SSI    DM neuropathy: no current meds    CAD: eliquis, statin    HTN: atenolol 25, lisinopril 5, HCTZ 12.5    HLD: lipitor 40    Hypokalemia: supplement    Bowels: Per protocol  Bladder: Per protocol   Sleep: Trazodone provided prn. Pain: tylenol, tramadol, robaxin PRN   DVT PPx: Eliquis   ELOS: 14-17    Jolynn Morrell MD 10/19/2022, 10:04 AM     * This document was created using dictation software. While all precautions were taken to ensure accuracy, errors may have occurred. Please disregard any typographical errors.

## 2022-10-19 NOTE — PLAN OF CARE
Problem: Safety - Adult  Goal: Free from fall injury  10/18/2022 2247 by Mehran Waddell RN  Outcome: Progressing     Problem: ABCDS Injury Assessment  Goal: Absence of physical injury  10/18/2022 2247 by Mehran Waddell RN  Outcome: Progressing

## 2022-10-19 NOTE — CARE COORDINATION
Social Work Admission Assessment    Objective:  Met with pt to complete initial assessment and review role of  in rehab process. Pt oriented to unit. Pt states understanding of this. Current Home Situation:  Patient lives at home with his girl-friend. They reside in a 2 story home . 5 steps to enter. Pt's plans re:  Return to work/school/volunteer:  Patient not employed. 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 Accessibility to community resources/transportation:  None. PCP with [facility]/Dr. Pk Grubbs    Has pt experienced a recent loss or signigicant life event that would impact their care or ability to participate? _x_No  __Yes - Explain    Has pt ever been treated for emotional disorders? _x_No  __Yes--How does that affect current situation:    How does pt and family cope with stressful events and this hospitalization? I get a little emotional when dealing with stress. Special Problem Areas:  Frequent Falls    Discharge Plan: To home with needed supports. Impression/Plan:  Ritu Loaiza (patient )is a 60year old male that has been admitted to ARU. Provided patient with this SW's contact information to contact as needed. Will continue to follow for support and discharge planning.       Electronically signed by KAMI Levy on 10/19/2022 at 5:40 PM

## 2022-10-19 NOTE — PROGRESS NOTES
Patient was admitted to room 4907 at [phone]. Patient was oriented to the Call Light, Phone, TV, Thermostat, Bed Controls, Bathroom and Emergency Cord. Patient verbalized and demonstrated understanding of all. Patient was also given an over view of Unit Routines for Acute Rehab. Patient states that their normal bowel regime is daily. Meal times were explained, including how to order food. The white board, (which is posted on the wall by the door is used for communication) has the Therapy Scheduled that is posted each day along with the name of your doctor, nurse, and therapist for your convenience. We recommend any family that will be care givers or any care givers the patient has, take part in therapy. We have no set visiting hours, we suggest non-caregiver friends and family visitors come after therapy (at 4 PM or later) to allow patient to rest in between sessions.       In conjunction with the patient and patients family, this nurse worked to establish a tailored Fall TIPS plan to ensure patient safety and compliance:    Falls TIPS Completion    Patient identified as increased risk for harm if fall:  [x] Yes     Fall Risks  History of Falls:    [] Yes   Medication Side Effects:   [] Yes   Walking Aid:    [] Yes   IV Pole or Equipment:   [] Yes   Unsteady Walk:     [x] Yes   May Forget or Choose Not to Call: [] Yes     Fall Interventions   Communicate Recent Fall and/or Risk of Harm: [x] Yes   Walking Aids:  Crutches: [] Yes   Cane: [] Yes   Walker: [] Yes   IV Assistance When Walking: [] Yes   Toileting Schedule: Every 2 Hours  Bedpan:   [] Yes   Assist to Commode: [] Yes   Assist to Bathroom: [x] Yes   Bed Alarm On: [x] Yes   Assistance Out of Bed:  Bedrest: [] Yes   1 Person: [] Yes   2 People: [x] Yes

## 2022-10-19 NOTE — PROGRESS NOTES
Admission Period/Goal QM Codes for [facility] [address] QM Admit Code Goal Code   Eating 5 6   Oral Hygiene 3 6   Toileting Hygiene 3 6   Shower/Bathing 2 6   UB Dressing 3 6   LB Dressing 1 6   Putting on/off Footwear 1 6   Rolling Left and Right 3 6   Sit To Lying 3 6   Lying to Sitting on Bedside 3 6   Sit to Stand 3 6   Chair/Bed to Chair Transfer 3 6   Toilet Transfers 3 6   Car Transfers 3 6   Walk 10 Feet 3 6   Walk 50 Feet with Two Turns 3 6   Walk 150 Feet 88 6   Walk 10 Feet on Uneven Surfaces 88 6   1 Step (Curb) 3 4   4 Steps 3 4   12 Steps 3 4   Picking up Object from Floor 88 6   Wheel 50 Feet with 2 Turns 9 -   Type - -   Wheel 150 Feet 9 -   Type - -       Following discussion at the Quality Measure Huddle, the above codes were determined to be the patient's usual performance at admission. OT:  Alison Mckenzie OTR/L #[id number], 10/21/2022, 1517     PT: Daria Agudelo, DPT [id number] 10/24/2022 722     RN:  Blair Diamond, 10/21/22, 11:20 AM     ST:  Ryan SEGUNDO  CCC-SLP #[id number] 10/21/2022, [address] [place] [address]     :  Ephraim Champagne PT, DPT [id number]  10/24/22  9:01 AM

## 2022-10-20 LAB
ANION GAP SERPL CALCULATED.3IONS-SCNC: 9 MMOL/L (ref 3–16)
BUN BLDV-MCNC: 28 MG/DL (ref 7–20)
CALCIUM SERPL-MCNC: 9.1 MG/DL (ref 8.3–10.6)
CHLORIDE BLD-SCNC: 105 MMOL/L (ref 99–110)
CO2: 25 MMOL/L (ref 21–32)
CREAT SERPL-MCNC: 1.1 MG/DL (ref 0.8–1.3)
GFR SERPL CREATININE-BSD FRML MDRD: >60 ML/MIN/{1.73_M2}
GLUCOSE BLD-MCNC: 134 MG/DL (ref 70–99)
GLUCOSE BLD-MCNC: 136 MG/DL (ref 70–99)
GLUCOSE BLD-MCNC: 162 MG/DL (ref 70–99)
GLUCOSE BLD-MCNC: 201 MG/DL (ref 70–99)
GLUCOSE BLD-MCNC: 209 MG/DL (ref 70–99)
HCT VFR BLD CALC: 35.9 % (ref 40.5–52.5)
HEMOGLOBIN: 11.6 G/DL (ref 13.5–17.5)
MCH RBC QN AUTO: 25.9 PG (ref 26–34)
MCHC RBC AUTO-ENTMCNC: 32.3 G/DL (ref 31–36)
MCV RBC AUTO: 80.3 FL (ref 80–100)
PDW BLD-RTO: 14.3 % (ref 12.4–15.4)
PERFORMED ON: ABNORMAL
PLATELET # BLD: 108 K/UL (ref 135–450)
PMV BLD AUTO: 8.7 FL (ref 5–10.5)
POTASSIUM SERPL-SCNC: 4 MMOL/L (ref 3.5–5.1)
RBC # BLD: 4.47 M/UL (ref 4.2–5.9)
SODIUM BLD-SCNC: 139 MMOL/L (ref 136–145)
WBC # BLD: 6 K/UL (ref 4–11)

## 2022-10-20 PROCEDURE — 97110 THERAPEUTIC EXERCISES: CPT

## 2022-10-20 PROCEDURE — 97116 GAIT TRAINING THERAPY: CPT

## 2022-10-20 PROCEDURE — 2580000003 HC RX 258: Performed by: PHYSICAL MEDICINE & REHABILITATION

## 2022-10-20 PROCEDURE — 97130 THER IVNTJ EA ADDL 15 MIN: CPT

## 2022-10-20 PROCEDURE — 92526 ORAL FUNCTION THERAPY: CPT

## 2022-10-20 PROCEDURE — 1280000000 HC REHAB R&B

## 2022-10-20 PROCEDURE — 80048 BASIC METABOLIC PNL TOTAL CA: CPT

## 2022-10-20 PROCEDURE — 97129 THER IVNTJ 1ST 15 MIN: CPT

## 2022-10-20 PROCEDURE — 92507 TX SP LANG VOICE COMM INDIV: CPT

## 2022-10-20 PROCEDURE — 97530 THERAPEUTIC ACTIVITIES: CPT

## 2022-10-20 PROCEDURE — 85027 COMPLETE CBC AUTOMATED: CPT

## 2022-10-20 PROCEDURE — 6370000000 HC RX 637 (ALT 250 FOR IP): Performed by: PHYSICAL MEDICINE & REHABILITATION

## 2022-10-20 PROCEDURE — 97535 SELF CARE MNGMENT TRAINING: CPT

## 2022-10-20 RX ORDER — CAPSAICIN 0.025 %
1 CREAM (GRAM) TOPICAL 2 TIMES DAILY
Status: ON HOLD | COMMUNITY
End: 2022-11-01 | Stop reason: HOSPADM

## 2022-10-20 RX ORDER — METHOCARBAMOL 500 MG/1
500 TABLET, FILM COATED ORAL 3 TIMES DAILY
Status: ON HOLD | COMMUNITY
End: 2022-11-01 | Stop reason: HOSPADM

## 2022-10-20 RX ORDER — POTASSIUM CHLORIDE 750 MG/1
10 CAPSULE, EXTENDED RELEASE ORAL DAILY
COMMUNITY

## 2022-10-20 RX ORDER — CHOLECALCIFEROL (VITAMIN D3) 125 MCG
500 CAPSULE ORAL EVERY OTHER DAY
COMMUNITY

## 2022-10-20 RX ORDER — LANOLIN ALCOHOL/MO/W.PET/CERES
6 CREAM (GRAM) TOPICAL NIGHTLY
COMMUNITY

## 2022-10-20 RX ORDER — FUROSEMIDE 20 MG/1
20 TABLET ORAL SEE ADMIN INSTRUCTIONS
Status: ON HOLD | COMMUNITY
End: 2022-11-01 | Stop reason: HOSPADM

## 2022-10-20 RX ADMIN — INSULIN LISPRO 2 UNITS: 100 INJECTION, SOLUTION INTRAVENOUS; SUBCUTANEOUS at 08:27

## 2022-10-20 RX ADMIN — INSULIN GLARGINE 40 UNITS: 100 INJECTION, SOLUTION SUBCUTANEOUS at 20:12

## 2022-10-20 RX ADMIN — INSULIN LISPRO 10 UNITS: 100 INJECTION, SOLUTION INTRAVENOUS; SUBCUTANEOUS at 12:09

## 2022-10-20 RX ADMIN — ATORVASTATIN CALCIUM 40 MG: 40 TABLET, FILM COATED ORAL at 20:12

## 2022-10-20 RX ADMIN — APIXABAN 5 MG: 5 TABLET, FILM COATED ORAL at 20:12

## 2022-10-20 RX ADMIN — LISINOPRIL 5 MG: 5 TABLET ORAL at 08:37

## 2022-10-20 RX ADMIN — APIXABAN 5 MG: 5 TABLET, FILM COATED ORAL at 08:38

## 2022-10-20 RX ADMIN — CAPSAICIN: 0.25 CREAM TOPICAL at 20:07

## 2022-10-20 RX ADMIN — MELATONIN TAB 3 MG 6 MG: 3 TAB at 20:12

## 2022-10-20 RX ADMIN — HYDROCHLOROTHIAZIDE 12.5 MG: 25 TABLET ORAL at 08:36

## 2022-10-20 RX ADMIN — SODIUM CHLORIDE, PRESERVATIVE FREE 10 ML: 5 INJECTION INTRAVENOUS at 12:28

## 2022-10-20 RX ADMIN — INSULIN LISPRO 10 UNITS: 100 INJECTION, SOLUTION INTRAVENOUS; SUBCUTANEOUS at 08:30

## 2022-10-20 RX ADMIN — POTASSIUM CHLORIDE 20 MEQ: 1500 TABLET, EXTENDED RELEASE ORAL at 08:37

## 2022-10-20 RX ADMIN — INSULIN LISPRO 10 UNITS: 100 INJECTION, SOLUTION INTRAVENOUS; SUBCUTANEOUS at 16:59

## 2022-10-20 RX ADMIN — ATENOLOL 25 MG: 50 TABLET ORAL at 08:45

## 2022-10-20 ASSESSMENT — PAIN DESCRIPTION - ORIENTATION: ORIENTATION: LOWER

## 2022-10-20 ASSESSMENT — PAIN SCALES - GENERAL
PAINLEVEL_OUTOF10: 1
PAINLEVEL_OUTOF10: 0
PAINLEVEL_OUTOF10: 2

## 2022-10-20 ASSESSMENT — PAIN DESCRIPTION - DESCRIPTORS: DESCRIPTORS: DISCOMFORT

## 2022-10-20 ASSESSMENT — PAIN - FUNCTIONAL ASSESSMENT: PAIN_FUNCTIONAL_ASSESSMENT: ACTIVITIES ARE NOT PREVENTED

## 2022-10-20 ASSESSMENT — PAIN DESCRIPTION - LOCATION: LOCATION: BACK

## 2022-10-20 ASSESSMENT — PAIN DESCRIPTION - FREQUENCY: FREQUENCY: INTERMITTENT

## 2022-10-20 ASSESSMENT — PAIN DESCRIPTION - PAIN TYPE: TYPE: CHRONIC PAIN

## 2022-10-20 ASSESSMENT — PAIN DESCRIPTION - ONSET: ONSET: GRADUAL

## 2022-10-20 NOTE — PROGRESS NOTES
Rolene Haezl Loaiza  10/20/2022  [id number]    Chief Complaint: Acute embolic stroke ([address]    Subjective:   No overnight events. No current complaints. Did very well in therapy yesterday. Labs reviewed. ROS: No CP, SOB, dyspnea    Objective:  Patient Vitals for the past 24 hrs:   BP Temp Temp src Pulse Resp SpO2   10/19/22 2145 (!) 149/78 97.8 °F (36.6 °C) Oral 62 18 95 %   10/19/22 1000 -- -- -- 65 -- --   10/19/22 0910 135/77 97.9 °F (36.6 °C) Oral 65 18 95 %     Gen: No distress, pleasant. Resting in bedside chair  HEENT: Normocephalic, atraumatic. CV: Regular rate and rhythm. No MRG   Resp: No respiratory distress. CTAB   Abd: Soft, nontender nondistended  Ext: No edema. Neuro: Alert, oriented, appropriately interactive. Laboratory data: Available via EMR. Therapy progress:    PT    Supine to Sit: Partial/moderate assistance  Sit to Supine:     Sit to Stand: Partial/moderate assistance  Chair/Bed to Chair Transfer: Partial/moderate assistance  Car Transfer: Partial/moderate assistance  Ambulation 10 ft: Partial/moderate assistance  Ambulation 50 ft: Partial/moderate assistance  Ambulation 150 ft:    Stairs - 1 Step: Partial/moderate assistance  Stairs - 4 Step: Partial/moderate assistance  Stairs - 12 Step: Partial/moderate assistance    OT    Eating:    Oral Hygiene: Supervision or touching assistance  Bathing: Substantial/maximal assistance  Upper Body Dressing: Partial/moderate assistance  Lower Body Dressing: Dependent  Toilet Transfer: Toilet Hygiene:      Speech Therapy    Pt upright in chair on room air. OME (oral motor exam) revealed R sided labial weakness and reduced ROM/ Coordination. Trails of thin liquid via straw and regular solids provided to assess swallow function ad safety. Oral phase characterized by prolonged mastication with regular solids secondary to edentulous.  Pt denies pocketing or difficulties with mastication due to R sided labial weakness and reduced ROM/coordination. Pharyngeal phase characterized by suspected delayed swallow initiation with adequate laryngeal elevation via manual palpation. Pt with immediate cough following thin liquid via straw. Pt recalled recommendation from previous speech at other facility to avoid straws. Based on swallow assessment recommend regular texture diet with thin liquids (no straws) and diet tolerance monitoring to ensure continued safety. Pt presents with mild dysarthria, cognitive-linguistic deficits, and oropharyngeal dysphagia. Pt motor speech characterized by reduced R sided labial ROM, coordination, strength and symmetry contributing to decreased rate of speech with imprecise articulation. Deficits contribute to infrequent mild intelligibility. Pt with good awareness of deficits. Cognitive-linguistic deficits characterized by impaired short term recall, immediate/working memory, and alternating attention. Visuospatial errors during clock drawing characterized by numbers being drawn backwards or upside down. Error did not impact placement of numbers or ability to draw correct time. Recommend skilled speech intervention to help pt return to prior level of communication and cognitive functioning and to increase level of independence at d/c. Body mass index is 39.29 kg/m². Assessment:  Patient Active Problem List   Diagnosis    HTN (hypertension)    DM2 (diabetes mellitus, type 2) ([facility]    High cholesterol    Acute embolic stroke ([facility] [address]       Plan:   Left cerebral hemisphere thromboembolic infarcts: Eliquis, statin. PT/OT/SLP     Nonrheumatic aortic valve stenosis: S/p TAVR on 10/11. Eliquis     DM: A1c 8.6, Lantus 40 (80), prandial 10 (20), and SSI     DM neuropathy: no current meds     CAD: eliquis, statin     HTN: atenolol 25, lisinopril 5, HCTZ 12.5     HLD: lipitor 40     Hypokalemia: supplement     Bowels: Per protocol  Bladder: Per protocol   Sleep: Trazodone provided prn.    Pain: tylenol, tramadol, robaxin PRN DVT PPx: Eliquis   KAYE: 11/1    Team conference was held today on the patient and discussed directly with the patient utilizing their entire treatment team. Please see separate team note for details. Total treatment time for today's care >35 min. Merline Broody, MD 10/20/2022, 8:35 AM    * This document was created using dictation software. While all precautions were taken to ensure accuracy, errors may have occurred. Please disregard any typographical errors.

## 2022-10-20 NOTE — PROGRESS NOTES
[facility] Department   Phone: [phone]    Physical Therapy    [] Initial Evaluation            [x] Daily Treatment Note         [] Discharge Summary      Patient: Scotty Acuna   : 1960   MRN: [id number]   Date of Service:  10/20/2022  Admitting Diagnosis: Acute embolic stroke [facility])  Current Admission Summary: 65-year-old male with a history of diabetes, MI, HTN, HLD, diabetic neuropathy, and nonrheumatic aortic valve stenosis who was admitted to  on 10/11 for a scheduled TAVR procedure. The procedure was complicated by an episode of V. fib and postoperatively he was noted to have neurologic deficits and work-up revealed a thromboembolic stroke involving the left cerebral territories. Work-up including TTE was unremarkable for PFO. He was initiated on Eliquis for prophylaxis. Patient requires significant insulin at baseline with his normal home regimen of 80 units of Lantus with 20 units Humalog 3 times daily. A1c was 8.6. Due to decreased p.o. intake his regimen was adjusted. He was evaluated by therapy and suggested to continue in an inpatient setting prior to returning home. He reports he is frustrated by his situation but motivated to recover. Past Medical History:  has a past medical history of Carbuncle, DM2 (diabetes mellitus, type 2) ([address] ED (erectile dysfunction), History of heart attack, HTN (hypertension), Hyperlipidemia, Peripheral neuropathy, Sleep apnea, and Wears glasses. Past Surgical History:  has a past surgical history that includes Cardiac catheterization (); Pilonidal cyst excision; cyst removal; cardiovascular stress test (2012); cardiovascular stress test (2019); back surgery; Colonoscopy (2021); and Colonoscopy (N/A, 2021).   Discharge Recommendations: home with home health PT  DME Required For Discharge: DME to be determined pending patient progress  Precautions/Restrictions: high fall risk  Weight Bearing Restrictions: weight bearing as tolerated  [] Right Upper Extremity  [] Left Upper Extremity [] Right Lower Extremity  [] Left Lower Extremity     Required Braces/Orthotics: no braces required   [] Right  [] Left  Positional Restrictions:no positional restrictions    Pre-Admission Information   Lives With: spouse    Type of Home: house  Home Layout: one level  Home Access:  5 step to enter without rails   Bathroom Layout: walker accessible, wheelchair accessible, tub only  Bathroom Equipment: hand held shower head, will use shower chair  Toilet Height: standard height- low  Home Equipment: single point cane, manual wheelchair  Transfer Assistance: Independent without use of device  Ambulation Assistance:Independent without use of device  ADL Assistance: independent with all ADL's, help with putting on socks  IADL Assistance: requires assistance with all homemaking tasks, would mow at home, handles his own medications  Active :        [x] Yes  [] No  Hand Dominance: [] Left  [x] Right  Current Employment: retired. Occupation: Engineering  Hobbies: Drive, fish, working on the car, yard work  [employer]: once a week in yard, has slipped a few times in shower        Subjective  General: Pt in chair on arrival.  States he is ready to get to work. Pain: 0/10  Pain Interventions: not applicable       Functional Mobility  Bed Mobility  Bed mobility not completed on this date. Comments:   Transfers  Sit to stand transfer: contact guard assistance, minimal assistance  Stand to sit transfer: contact guard assistance, minimal assistance  Stand pivot transfer: contact guard assistance, minimal assistance  Comments: Inconsistent with transfers and varying levels of assistance between CGA-Amarilis with each one.    Ambulation  Surface:level surface  Assistive Device: rolling walker  Other Appliance: (R), dorsiflex assist, walker splint attachment  Assistance: contact guard assistance, minimal assistance  Distance: 80' ft  Gait Mechanics: Inconsistent skip speed causing fall risk, decreased R step length and height. Required verbal cues to increase R step length and height. Improved ability to push walker on his own, improved R heel strike with dorsiflex assist  Comments: A RW hand splint was utilized to assist R hand with gripping walker. Ace wrap was also used to assist with dorsiflexion. Stair Mobility  Stair mobility not completed on this date. Comments:   Wheelchair Mobility:  No w/c mobility completed on this date. Comments:  Balance  Static Sitting Balance: fair (-): maintains balance at CGA with use of UE support  Dynamic Sitting Balance: fair (-): maintains balance at CGA with use of UE support  Static Standing Balance: fair (-): maintains balance at CGA with use of UE support  Dynamic Standing Balance: fair (-): maintains balance at CGA with use of UE support  Comments: Very unsteady with transfers and ambulation. Has extensive history of falls. Other Therapeutic Interventions  First Session: Pt in chair on arrival. Pt performed ambulation as noted above. Pt performed X 10 B toe taps and X 10 B step ups, both on 4\" step with B handrail and CGA. Pt required verbal and tactile cues to keep R hip anteriorly rotated when performing step ups with his L foot. When R pelvis anteriorly rotated, pt demonstrated difficulty maintaining straight knee during stance on RLE. Hyperextension of R knee noted when pelvis note facilitated to neutral.  Pt also performed seated coordination/stability activity consisting of moving R foot clockwise X 10 and counterclockwise X 10 on small stability ball. Pt required tactile cues to help facilitate and control his R foot with the seated coordination activity. Pt was returned to room and placed from wc to recliner using a stand pivot transfer, and pt was left with chair alarm on. Second Session: Patient sitting in recliner chair upon arrival with visitor.  Patient is agreeable to PT. Patient reports fatigue from this morning session. Patient denies pain. Patient performs STS from recliner chair with RW placed in front min Ax1. Patient ambulated 15 feet with CGA and DF assist wrap. Patient performs stand to sit to w/c with RW min Ax1. Patient performed seated B marching with 3 lb ankle weights 2x10, seated hip adductor squeeze 2x10 with 5 second hold, seated orange resisted hip abduction 2x10, and resisted R knee flexion with orange resistance band 2x10. Patient ambulated 10 feet with RW to recliner chair at min Ax1 and DF assist wrap. Patient reports fatigue following session, otherwise in good spirits. Patient's chair alarm was turned on and call light was provided. Functional Outcomes                 Cognition  WFL  Orientation:    alert and oriented x 4  Command Following:   [facility]    Education  Barriers To Learning: none  Patient Education: patient educated on goals, PT role and benefits, general safety, functional mobility training, proper use of assistive device/equipment, transfer training  Learning Assessment:  patient verbalizes and demonstrates understanding    Assessment  Activity Tolerance: decreased endurance  Impairments Requiring Therapeutic Intervention: decreased functional mobility, decreased ADL status, decreased ROM, decreased strength, decreased safety awareness, decreased endurance, decreased sensation, decreased balance  Prognosis: good  Clinical Assessment:  Pt continues to present with impaired functional mobility and R sided UE/LE strength and ROM deficits. Pt's deficits are affecting his gait mechanics and ability to transfer safely. While pt demonstrated improvements with transfers sequencing, he remains inconsistent with eccentric control when sitting and at times requires Amarilis to help when standing from sitting.  Pt continues to require skilled ARU physical therapy services in order to address deficits and promote independence to allow the patient to safely succeed in his home environment. Safety Interventions: patient left in chair, chair alarm in place, call light within reach, and patient at risk for falls    Plan  Frequency: 5 x/week, 90 min/day  Current Treatment Recommendations: strengthening, ROM, balance training, functional mobility training, transfer training, gait training, stair training, endurance training, neuromuscular re-education, ADL/self-care training, and home management training    Goals  Patient Goals: Get back to driving, get back to moving around without falling   Short Term Goals:  Time Frame: 2 weeks  Patient will complete bed mobility at modified independent   Patient will complete transfers at [facility]   Patient will ambulate 150 ft with use of LRAD at [facility]  Patient will ascend/descend 8 stairs with (B) handrail at stand by assistance  Patient will complete car transfer at [facility]    Therapy Session Time      Individual Group Co-treatment   Time In  900      Time Out  945      Minutes  45        Timed Code Treatment Minutes:  45    Second Session Therapy Time:   Individual Concurrent Group Co-treatment   Time In  1400         Time Out  1430         Minutes  30           Timed Code Treatment Minutes:  30      Total Treatment Minutes:  75       Electronically Signed By: Angélica Rivera PT    [facility]  Therapist was present, directed the patient's care, made skilled judgement, and was responsible for assessment and treatment of the patient. Co-signed and supervised by:  Angélica Rivera DPT [id number]    Second Session:  Jeffy Olvera PT, DPT, [id number]

## 2022-10-20 NOTE — PROGRESS NOTES
[facility] Department   Phone: [phone]    Occupational Therapy    [] Initial Evaluation            [x] Daily Treatment Note         [] Discharge Summary      Patient: Gil Das   : 1960   MRN: [id number]   Date of Service:  10/20/2022    Admitting Diagnosis:  Acute embolic stroke [facility])  Current Admission Summary: 27-year-old male with a history of diabetes, MI, HTN, HLD, diabetic neuropathy, and nonrheumatic aortic valve stenosis who was admitted to  on 10/11 for a scheduled TAVR procedure. The procedure was complicated by an episode of V. fib and postoperatively he was noted to have neurologic deficits and work-up revealed a thromboembolic stroke involving the left cerebral territories. Work-up including TTE was unremarkable for PFO. He was initiated on Eliquis for prophylaxis. Patient requires significant insulin at baseline with his normal home regimen of 80 units of Lantus with 20 units Humalog 3 times daily. A1c was 8.6. Due to decreased p.o. intake his regimen was adjusted. He was evaluated by therapy and suggested to continue in an inpatient setting prior to returning home  Past Medical History:  has a past medical history of Carbuncle, DM2 (diabetes mellitus, type 2) ([address] ED (erectile dysfunction), History of heart attack, HTN (hypertension), Hyperlipidemia, Peripheral neuropathy, Sleep apnea, and Wears glasses. Past Surgical History:  has a past surgical history that includes Cardiac catheterization (); Pilonidal cyst excision; cyst removal; cardiovascular stress test (2012); cardiovascular stress test (2019); back surgery; Colonoscopy (2021); and Colonoscopy (N/A, 2021).     Discharge Recommendations: [place] [facility] S[facility]    DME Required For Discharge: DME to be determined pending patient progress; likely to require shower chair    Precautions/Restrictions: high fall risk, up as tolerated  Weight Bearing Restrictions: no restrictions    Required Braces/Orthotics: no braces required  Positional Restrictions:no positional restrictions    Pre-Admission Information   Lives With: spouse                  Type of Home: house  Home Layout: one level  Home Access:  5 step to enter without rails   Bathroom Layout: walker accessible, wheelchair accessible, tub/shower only  Bathroom Equipment: hand held shower head  Toilet Height: standard height- low  Home Equipment: single point cane, manual wheelchair  Transfer Assistance: Independent without use of device  Ambulation Assistance:Independent without use of device  ADL Assistance: independent with all ADL's with exception of assist to don socks- pt is IND w/ shoes pending shoe style   IADL Assistance: requires assistance with most homemaking tasks; pt IND to mow lawn on riding mower; IND with medications and light meal prep (microwave use/snacks)  Active :        [x] Yes                 [] No  Hand Dominance: [] Left                 [x] Right  Current Employment: retired. Occupation: Engineering  Hobbies: Drive, fish, working on the car, yard work  [employer]: once a week in yard, has slipped a few times in shower        Subjective: Pt supine in bed upon entry, pleasant and agreeable to therapy session. General: Pt supine to sit Min A, pt sit to stand Min A and pt ambulated to bathroom toilet Min A with rw and  assist right ~12ft. Pt toilet transfer Min A and pt toileted Mod A for assist with brief mgmt. Pt ambulated several ft to sink and stand to sit CGA. Pt seated at sink with oral care/wash face/shave electric razor. Pt sit to stand Min A and ambulated ~18ft to recliner. Pt stand to sit Min A. Pt doffed t-shirt and donned new t-shirt (pt educated on meli dressing) pt impulsive with dressing and required Min A to thread RUE. Pt threaded pants with assist to thread RLE and pt required Min A to thread LLE. Pt sit to stand Min A and pt pulled up by this writer.  Call light in reach and chair alarm on. Pain: 0/10  Pain Interventions: not applicable        Activities of Daily Living  Basic Activities of Daily Living  Grooming: stand by assistance Comment: oral care/wash face/shave with electric razor seated at sink  Upper Extremity Dressing: minimal assistance  Lower Extremity Dressing: maximum assistance  Dressing Comments: pt impulsive and inpatient with dressing  Toileting: moderate assistance. Instrumental Activities of Daily Living  No IADL completed on this date. Functional Mobility  Bed Mobility  Supine to Sit: minimal assistance  Comments:assist to trunk, HOB flat  Transfers  Sit to stand transfer:minimal assistance  Stand to sit transfer: minimal assistance  Toilet transfer: minimal assistance  Toilet transfer equipment: standard toilet, grab bars  Comments: vcs for safety and hand placement  Functional Mobility:  Sitting Balance: stand by assistance. Standing Balance: contact guard assistance. Functional Mobility: .  minimal assistance  Functional Mobility Activity: to/from bathroom  Functional Mobility Device Use: rolling walker  Functional Mobility Comment: use of R hand RW  splint for mobility      ADDENDUM [id number] SECOND THERAPY SESSION  Pt seated in recliner, reports no pain, pt's girlfriend Priscila Milder present. Pt motivated for therapy session. Pt doffed t-shirt and donned t-shirt with vcs/meli technique/setup at [facility]. Pt used reacher to doff pants and  socks. Pt used reacher to thread BLEs into pants. Pt required total assist to don shoes with LHS. Pt sit to stand Min A and this writer assisted pt in pulling up pants. Pt stand to sit Min A. Pt with small nub ball, passed from left hand to right hand with difficulty and needed assist for  on right hand. Pt with bilateral  of SPC and minimal assist with right  performed 10 reps x 3 sets of the following: elbow flexion/extension and shoulder flexion. Pt with rest breaks between sets.  Pt given wash cloth on table and performed ~10 wash cloth crumbles (pt unable to extend digits after flexion and required assist. Pt also with shoulder flexion pushing washcloth across table. At [facility] call light in reach and chair alarm on. Cognition  Overall Cognitive Status: WFL  Arousal/Alterness: appropriate responses to stimuli  Following Commands: follows all commands without difficulty  Attention Span: attends with cues to redirect  Safety Judgement: decreased awareness of need for assistance, decreased awareness of need for safety  Problem Solving: assistance required to implement solutions  Insights: fully aware of deficits  Initiation: requires cues for some  Sequencing: requires cues for some  Comment: cog appears overall WFL based on functional tasks completed during eval; dysarthric speech  Orientation:    alert and oriented x 4  Command Following:   [facility]     Education  Barriers To Learning: none  Patient Education: patient educated on goals, OT role and benefits, plan of care, ADL adaptive strategies, transfer training, discharge recommendations  Learning Assessment:  patient verbalizes understanding, would benefit from continued reinforcement    Assessment  Activity Tolerance: Good and needed rest breaks  Impairments Requiring Therapeutic Intervention: decreased functional mobility, decreased ADL status, decreased ROM, decreased strength, decreased safety awareness, decreased endurance, decreased balance, decreased IADL, decreased fine motor control, decreased coordination  Prognosis: good  Clinical Assessment: Pt is a 60 yr old male who presents post CVA w/ primarily R side and speech/language deficits. Pt is below baseline level of function d/t above deficits. Pt heavily educated on adaptive meli dressing techniques both therapy sessions. Pt Min A to don pants and SBA to don shirt by end of 2nd therapy session with vcs/setup and pt impulsive with dressing. Pt Min A for functional mobility and transfers.  Pt continues to benefit from intensive inpt therapy to maximize functional independence and safety. Continue with POC.   Safety Interventions: patient left in chair, chair alarm in place, call light within reach, gait belt, patient at risk for falls, nurse notified, and family/caregiver present    Plan  Frequency: 5 x/week, 60 min/day  Current Treatment Recommendations: strengthening, ROM, balance training, functional mobility training, transfer training, endurance training, neuromuscular re-education, ADL/self-care training, IADL training, home management training, safety education, equipment evaluation/education, and positioning    Goals  Patient Goals: \"get back to driving\", regain IND with mobility    Short Term Goals:  Time Frame: 10-14 days  Patient will complete upper body ADL at supervision   Patient will complete lower body ADL at minimal assistance   Patient will complete toileting at minimal assistance   Patient will complete self-feeding at modified independent   Patient will complete grooming at modified independent   Patient will complete functional transfers at supervision     Long Term Goals:  Time Frame: 2-3 weeks   Patient will complete upper body ADL at modified independent   Patient will complete lower body ADL at modified independent   Patient will complete toileting at modified independent   Patient will complete functional transfers at modified independent   Patient will complete functional mobility at modified independent   Patient to gather and transport IADL items at supervision      Therapy Session Time     Individual Group Co-treatment   Time In 0730      Time Out 0800      Minutes 30     Timed Code Treatment Minutes:  30    Second Session Therapy Time:   Individual Concurrent Group Co-treatment   Time In  1015         Time Out  1100         Minutes  45         Timed Code Treatment Minutes: 45   Total Treatment Minutes:   30+ 45 = 75 minutes         Electronically Signed By:Shirley Vazquez, CONNER/L [id number]       I have reviewed and agree with the above treatment note.     Giancarlo Mcleod, OTR/L, [id number]

## 2022-10-20 NOTE — CARE COORDINATION
Team conference held today. Spoke with patient and his significant other to discuss progress with therapy, barriers to discharge, and plans to return home. Estimated discharge date set for 11/01/2022 Patient anticipates discharging to home with needed supports. Will continue to follow for support and discharge planning.     Electronically signed by KAMI Lobo on 10/20/2022 at 4:53 PM

## 2022-10-20 NOTE — PLAN OF CARE
Problem: Discharge Planning  Goal: Discharge to home or other facility with appropriate resources  10/20/2022 1054 by Blair Diamond RN  Outcome: Progressing  10/20/2022 0121 by Angela Apodaca RN  Outcome: Progressing     Problem: Skin/Tissue Integrity  Goal: Absence of new skin breakdown  Description: 1. Monitor for areas of redness and/or skin breakdown  2. Assess vascular access sites hourly  3. Every 4-6 hours minimum:  Change oxygen saturation probe site  4. Every 4-6 hours:  If on nasal continuous positive airway pressure, respiratory therapy assess nares and determine need for appliance change or resting period.   10/20/2022 1054 by Blair Diamond RN  Outcome: Progressing  10/20/2022 0121 by Angela Apodaca RN  Outcome: Progressing     Problem: Safety - Adult  Goal: Free from fall injury  10/20/2022 1054 by Blair Diamnod RN  Outcome: Progressing  10/20/2022 0121 by Angela Apodaca RN  Outcome: Progressing     Problem: ABCDS Injury Assessment  Goal: Absence of physical injury  10/20/2022 1054 by Blair Diamond RN  Outcome: Progressing  10/20/2022 0121 by Angela Apodaca RN  Outcome: Progressing     Problem: Pain  Goal: Verbalizes/displays adequate comfort level or baseline comfort level  Outcome: Progressing

## 2022-10-20 NOTE — PROGRESS NOTES
[facility] Department   Phone: [phone]    Speech Therapy    [] Initial Evaluation            [x] Daily Treatment Note         [] Discharge Summary      Patient: Lance Washburn   : 1960   MRN: [id number]   Date of Service:  10/20/2022  Admitting Diagnosis: Acute embolic stroke [facility])  Current Admission Summary: 57-year-old male with a history of diabetes, MI, HTN, HLD, diabetic neuropathy, and nonrheumatic aortic valve stenosis who was admitted to  on 10/11 for a scheduled TAVR procedure. The procedure was complicated by an episode of V. fib and postoperatively he was noted to have neurologic deficits and work-up revealed a thromboembolic stroke involving the left cerebral territories. Work-up including TTE was unremarkable for PFO. He was initiated on Eliquis for prophylaxis. Patient requires significant insulin at baseline with his normal home regimen of 80 units of Lantus with 20 units Humalog 3 times daily. A1c was 8.6. Due to decreased p.o. intake his regimen was adjusted. He was evaluated by therapy and suggested to continue in an inpatient setting prior to returning home. He reports he is frustrated by his situation but motivated to recover. Past Medical History:  has a past medical history of Carbuncle, DM2 (diabetes mellitus, type 2) ([address] ED (erectile dysfunction), History of heart attack, HTN (hypertension), Hyperlipidemia, Peripheral neuropathy, Sleep apnea, and Wears glasses. Past Surgical History:  has a past surgical history that includes Cardiac catheterization (); Pilonidal cyst excision; cyst removal; cardiovascular stress test (2012); cardiovascular stress test (2019); back surgery; Colonoscopy (2021); and Colonoscopy (N/A, 2021).   Precautions/Restrictions: high fall risk, up as tolerated  Instrumental Swallow Study: FEES at outside hospital completed on 10/12/2022      Pre-Admission Information   Living Status: Pt lives with his significant other Jose Jennings), a dog and cat. 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 Pt pleasant, participative and agreeable to trial of VitalStim. Pain: Did not state    Safety Interventions: patient left in chair, chair alarm in place, and family/caregiver present      Therapeutic Interventions:     Session 1:   Dysphagia: Severity: Mild  and Moderate   Pt seen consuming regular solids and thin liquids during breakfast meal to assess texture tolerance in combination with VitalStim at 4a placement   - SLP provided set-up assist of breakfast meal   - mildly prolonged mastication, delayed oral clearing but no s/s of pocketing across solid intake  - two episodes of coughing with thin liquids via cup, suspect premature bolus loss to pharynx  - good oral containment of solids and liquids this date  - decreased laryngeal elevation and s/s of delayed swallow initiation noted with all PO intake \  Pt seen consuming meds whole with water   - no overt clinical s/s of aspiration, good oral clearance of all meds     Motor Speech: Severity: Mild  and Moderate   Education provided regarding VitalStim protocol and contraindications. Pt agreeable to participation.      Patient tolerated NMES via VitalStim placement 4a (targeting the orpharyngeal \"sling\", intrinsic/extrinsic muscles of the tongue and pharyngeal constrictor) @ 13.5 for 25 minutes  - initially VitalStim device reporting poor connection of R side until pt better shaved, eliciting a better connection response  - pt engaged with reporting tingling, stinging and pulling responses  - overall tolerating and agreeable to further trials in next session    Cognition: Severity: Mild   Subtests of the Assessment of Language-Related Functional Activities (FRANKLYN) were completed to further assess cognition. Results listed below. - Counting money: 90%, required repetition x2-3 in 3 out of 10 trials  presented verbally, appeared related to decreased attention to detail   - Solving daily math problems: 80%, pt read problems and was able to self correct x2, errors related to reading question incorrectly, delayed processing of information in questions (what time would it be 1.5 hours ago vs what time would it be in 1.5 hours)    Pt reported how he manages own medications in cabinet   - able to recall 4/7 meds taken prior to hospitalization independently   - recalled insulin protocol     Additional Interventions:      Education  Barriers To Learning: none  Patient Education: Provided education regarding role of SLP, results of assessment, recommendations and general speech pathology plan of care. Learning Assessment: Pt verbalized understanding     Assessment  Impairments Requiring Therapeutic Intervention: Dysarthria , Cognitive-Linguistic Deficits , and Oropharyngeal Dysphagia   Prognosis: good    Clinical Assessment:  Pt presents with mild dysarthria, cognitive-linguistic deficits, and oropharyngeal dysphagia. Pt motor speech characterized by reduced R sided labial ROM, coordination, strength and symmetry contributing to decreased rate of speech with imprecise articulation. Deficits contribute to infrequent mild intelligibility. This date, pt tolerated vital stim to target oral symmetry, labial and buccal strength, ROM and coordination and oral phase deficits including improvement with oral containment and ease of mastication. Pt with good awareness of deficits. Cognitive-linguistic deficits characterized by impaired short term recall, immediate/working memory, and attention to detail.  Pt demonstrates good awareness of deficits and is able to self correct cognitive errors >50% of time. Recommend ongoing skilled speech intervention to help pt return to prior level of communication and cognitive functioning and to increase level of independence at d/c. Diet Solids Recommendation:   Liquid Consistency Recommendation:   Recommended Form of Meds:   Regular texture diet     Thin liquids     Meds whole with water       Recommended Compensatory Swallowing Strategies: Upright as possible with all PO intake , No straws , Small bites/sips , Eat/feed slowly, Remain upright 30-45 min       Plan  Frequency: 60 minutes/day; 5 days per week, as tolerated, until goals met, or discharged from ARU. Therapeutic Interventions: Diet Tolerance Monitoring  Speech / Motor Planning / Voice intervention  and Cognitive-Linguistic intervention   Discharge Recommendations: Home independently  Continued SLP at Discharge: TBD based upon progress     Goals  Patient Goals: \"improve my speech\"   Time Frame: 2 weeks     Pt will tolerate recommended diet and advanced trials with no overt s/s of aspiration. Ongoing  Pt will improve oral motor strength and ROM for coordinated and alternating motions, via graded tasks to improve speech back to baseline level as subjectively rated by SLP/pt and family. Ongoing   Pt will improve executive function for multifactor task completion via graded tasks to 80% accuracy. Ongoing   Pt will improve attention to detail for graded complex information to 80%, for improved recall and retention of newly learned information.  Ongoing       Therapy Session Time      Session 1   Time In 0800   Time Out 0900   Time Code Minutes 25   Individual Minutes 60     Timed Code Treatment Minutes:  25  Total Treatment Minutes:  60    Electronically Signed By:  Gayathri Baker M.A., [address] [facility]  Speech-Language Pathologist

## 2022-10-21 LAB
GLUCOSE BLD-MCNC: 100 MG/DL (ref 70–99)
GLUCOSE BLD-MCNC: 172 MG/DL (ref 70–99)
GLUCOSE BLD-MCNC: 213 MG/DL (ref 70–99)
GLUCOSE BLD-MCNC: 64 MG/DL (ref 70–99)
GLUCOSE BLD-MCNC: 81 MG/DL (ref 70–99)
PERFORMED ON: ABNORMAL
PERFORMED ON: NORMAL

## 2022-10-21 PROCEDURE — 97116 GAIT TRAINING THERAPY: CPT

## 2022-10-21 PROCEDURE — 97110 THERAPEUTIC EXERCISES: CPT

## 2022-10-21 PROCEDURE — 97530 THERAPEUTIC ACTIVITIES: CPT

## 2022-10-21 PROCEDURE — 92507 TX SP LANG VOICE COMM INDIV: CPT

## 2022-10-21 PROCEDURE — 97129 THER IVNTJ 1ST 15 MIN: CPT

## 2022-10-21 PROCEDURE — 97535 SELF CARE MNGMENT TRAINING: CPT

## 2022-10-21 PROCEDURE — 92526 ORAL FUNCTION THERAPY: CPT

## 2022-10-21 PROCEDURE — 97112 NEUROMUSCULAR REEDUCATION: CPT

## 2022-10-21 PROCEDURE — 6370000000 HC RX 637 (ALT 250 FOR IP): Performed by: PHYSICAL MEDICINE & REHABILITATION

## 2022-10-21 PROCEDURE — 1280000000 HC REHAB R&B

## 2022-10-21 PROCEDURE — 97130 THER IVNTJ EA ADDL 15 MIN: CPT

## 2022-10-21 RX ORDER — ESCITALOPRAM OXALATE 10 MG/1
5 TABLET ORAL DAILY
Status: DISCONTINUED | OUTPATIENT
Start: 2022-10-21 | End: 2022-11-01 | Stop reason: HOSPADM

## 2022-10-21 RX ORDER — LOPERAMIDE HYDROCHLORIDE 2 MG/1
2 CAPSULE ORAL 4 TIMES DAILY PRN
Status: DISCONTINUED | OUTPATIENT
Start: 2022-10-21 | End: 2022-11-01 | Stop reason: HOSPADM

## 2022-10-21 RX ADMIN — CYANOCOBALAMIN TAB 1000 MCG 500 MCG: 1000 TAB at 08:28

## 2022-10-21 RX ADMIN — CAPSAICIN: 0.25 CREAM TOPICAL at 21:42

## 2022-10-21 RX ADMIN — FUROSEMIDE 20 MG: 20 TABLET ORAL at 08:34

## 2022-10-21 RX ADMIN — APIXABAN 5 MG: 5 TABLET, FILM COATED ORAL at 21:34

## 2022-10-21 RX ADMIN — INSULIN LISPRO 10 UNITS: 100 INJECTION, SOLUTION INTRAVENOUS; SUBCUTANEOUS at 08:26

## 2022-10-21 RX ADMIN — LOPERAMIDE HYDROCHLORIDE 2 MG: 2 CAPSULE ORAL at 17:16

## 2022-10-21 RX ADMIN — ATENOLOL 25 MG: 50 TABLET ORAL at 08:32

## 2022-10-21 RX ADMIN — INSULIN LISPRO 10 UNITS: 100 INJECTION, SOLUTION INTRAVENOUS; SUBCUTANEOUS at 13:34

## 2022-10-21 RX ADMIN — HYDROCHLOROTHIAZIDE 12.5 MG: 25 TABLET ORAL at 08:28

## 2022-10-21 RX ADMIN — APIXABAN 5 MG: 5 TABLET, FILM COATED ORAL at 08:28

## 2022-10-21 RX ADMIN — INSULIN LISPRO 2 UNITS: 100 INJECTION, SOLUTION INTRAVENOUS; SUBCUTANEOUS at 13:34

## 2022-10-21 RX ADMIN — ATORVASTATIN CALCIUM 40 MG: 40 TABLET, FILM COATED ORAL at 21:34

## 2022-10-21 RX ADMIN — POTASSIUM CHLORIDE 20 MEQ: 1500 TABLET, EXTENDED RELEASE ORAL at 08:34

## 2022-10-21 RX ADMIN — LISINOPRIL 5 MG: 5 TABLET ORAL at 08:28

## 2022-10-21 RX ADMIN — INSULIN LISPRO 10 UNITS: 100 INJECTION, SOLUTION INTRAVENOUS; SUBCUTANEOUS at 17:16

## 2022-10-21 RX ADMIN — ESCITALOPRAM OXALATE 5 MG: 10 TABLET ORAL at 08:33

## 2022-10-21 RX ADMIN — INSULIN GLARGINE 40 UNITS: 100 INJECTION, SOLUTION SUBCUTANEOUS at 21:41

## 2022-10-21 ASSESSMENT — PAIN - FUNCTIONAL ASSESSMENT
PAIN_FUNCTIONAL_ASSESSMENT: ACTIVITIES ARE NOT PREVENTED
PAIN_FUNCTIONAL_ASSESSMENT: ACTIVITIES ARE NOT PREVENTED

## 2022-10-21 ASSESSMENT — PAIN DESCRIPTION - DESCRIPTORS
DESCRIPTORS: ACHING
DESCRIPTORS: BURNING;ACHING
DESCRIPTORS: BURNING

## 2022-10-21 ASSESSMENT — PAIN DESCRIPTION - LOCATION
LOCATION: BUTTOCKS;BACK
LOCATION: RECTUM
LOCATION: BUTTOCKS;BACK

## 2022-10-21 ASSESSMENT — PAIN SCALES - GENERAL
PAINLEVEL_OUTOF10: 7
PAINLEVEL_OUTOF10: 7
PAINLEVEL_OUTOF10: 0
PAINLEVEL_OUTOF10: 10

## 2022-10-21 ASSESSMENT — PAIN DESCRIPTION - PAIN TYPE
TYPE: CHRONIC PAIN
TYPE: ACUTE PAIN
TYPE: CHRONIC PAIN

## 2022-10-21 ASSESSMENT — PAIN DESCRIPTION - ORIENTATION
ORIENTATION: LOWER
ORIENTATION: LOWER

## 2022-10-21 ASSESSMENT — PAIN DESCRIPTION - ONSET
ONSET: ON-GOING
ONSET: ON-GOING

## 2022-10-21 ASSESSMENT — PAIN DESCRIPTION - FREQUENCY
FREQUENCY: INTERMITTENT
FREQUENCY: INTERMITTENT

## 2022-10-21 NOTE — PROGRESS NOTES
Acute Rehab Unit  Stroke Education      Patient participated in stroke education focused on topics of:  Stroke overview   Signs and symptoms of stroke   Stroke risk factors   Rehabilitation team   Caregiver information   Diet and exercise   Home modification   Post Stroke Depression Management   Bowel and Bladder Management   Driving/Travel   Sexuality   Resources   Medication Log   Weekly Schedule Planning Sheet   Monthly Planning Sheet      Patient:    [] was socially appropriate and engaged in conversation    [x] requires reinforcement of topics covered    [x] caregiver was present, appropriate and engaged in coversation                Name/relationship: significant other, Yulisa Whelan      Materials Provided:  - [facility] Acute Rehabilitation Unit Guide to Stroke Recovery   - Neurologic Drivers Evaluation Information  - Stroke Survivor Story     Time:  Time In: 12  Time Out: 1320  Total Minutes: 35    Signature:   Montserrat Fatima M.A.  CCC-SLP S.P. [id number]  Speech-Language Pathologist   10/21/2022 1:56 PM

## 2022-10-21 NOTE — PROGRESS NOTES
[facility] Department   Phone: [phone]    Speech Therapy    [] Initial Evaluation            [x] Daily Treatment Note         [] Discharge Summary      Patient: Jacqueline David   : 1960   MRN: [id number]   Date of Service:  10/21/2022  Admitting Diagnosis: Acute embolic stroke [facility])  Current Admission Summary: 56-year-old male with a history of diabetes, MI, HTN, HLD, diabetic neuropathy, and nonrheumatic aortic valve stenosis who was admitted to  on 10/11 for a scheduled TAVR procedure. The procedure was complicated by an episode of V. fib and postoperatively he was noted to have neurologic deficits and work-up revealed a thromboembolic stroke involving the left cerebral territories. Work-up including TTE was unremarkable for PFO. He was initiated on Eliquis for prophylaxis. Patient requires significant insulin at baseline with his normal home regimen of 80 units of Lantus with 20 units Humalog 3 times daily. A1c was 8.6. Due to decreased p.o. intake his regimen was adjusted. He was evaluated by therapy and suggested to continue in an inpatient setting prior to returning home. He reports he is frustrated by his situation but motivated to recover. Past Medical History:  has a past medical history of Carbuncle, DM2 (diabetes mellitus, type 2) ([address] ED (erectile dysfunction), History of heart attack, HTN (hypertension), Hyperlipidemia, Peripheral neuropathy, Sleep apnea, and Wears glasses. Past Surgical History:  has a past surgical history that includes Cardiac catheterization (); Pilonidal cyst excision; cyst removal; cardiovascular stress test (2012); cardiovascular stress test (2019); back surgery; Colonoscopy (2021); and Colonoscopy (N/A, 2021).   Precautions/Restrictions: high fall risk, up as tolerated  Instrumental Swallow Study: FEES at outside hospital completed on 10/12/2022      Pre-Admission Information   Living Status: Pt lives with his significant other Jaime Leavitt), a dog and cat. 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 Appropriately attended to tasks. Session 2: Pt awake and alert in chair. Reports being tired from PT but willing to participate in session. Pt motivated and functionally attending to task. Pain: Did not state    Safety Interventions: patient left in chair, chair alarm in place, and family/caregiver present (for first session)       Therapeutic Interventions:     Session 1: Jordin Gonzalez M.A. CCC-SLP   Dysphagia: Severity: Mild  and Moderate   Pt declined any of his breakfast meal; not a breakfast eater and does not like the hospital food. He demonstrated trace anterior loss of thin liquid in the right labial corner without awareness. Provided education regarding frequent checks due to sensory changes s/p CVA.  No overt s/s of aspiration were assessed with small sample size of thin liquids     NMES was not attempted secondary to facial hair; pt plans to shave while with OT to possibly attempt NMES during next session     Targeted right oral motor strength and coordination via resistance exercise   - pudding via straw x3; minimal effort to propel entire length of straw   - SLP applied resistance with frozen stimulus x20; appropriate resistance applied for labial seal and bolus propulsion     Motor Speech: Severity: Mild  and Moderate    Oral reading (rainbow passage)    - recorded on pt's personal device for future progress assessments    - mild to moderately reduced articulatory precision (mainly labial sounds)    - pt acknowledged precision impairments but verbalized \"not my normal, but not bad\"    - encouraged use of loud voice to aid in clear precise speech; Pt verbalized understanding     Cognition: Severity: Mild   Goal not targeted this session. Fx Recall indirectly targeted:    - Recalled this SLP from two day prior    - Recalled new medication recommended by MD this morning     Additional Interventions:    Session 2: Delores Nath M.A. CCC-SLP   Dysphagia: Severity: Mild  and Moderate   Goal not targeted this session     Motor Speech: Severity: Mild  and Moderate   Goal not targeted this session    Cognition: Severity: Mild   FRANKLYN (Assessment of Language-Related Functional Activities)  -pt completed Subtest 6 (understanding medication labels) with 80% accuracy, pt missing one question due to attention to detail (am vs pm)  -pt reports not using a pill organizer at home but functionally used paper version during subtest  -Subtest 7 (using a calendar) skipped as pt reports not using a calendar/planner at home   -pt completed subtest 8 (Reading instructions) with 90% accuracy   -pt completed subtest 10 (Writing a phone message) with 80% accuracy   -pt using non dominate hand to write (L hand) due to right sided weakness. Pt handwriting ~90% legible to SLP grad student and pt stated 100% legible to self      Additional Interventions:        Education  Barriers To Learning: none  Patient Education: Provided education regarding role of SLP, rationale for treatment tasks and general speech pathology plan of care. Learning Assessment: Pt verbalized understanding     Assessment  Impairments Requiring Therapeutic Intervention: Dysarthria , Cognitive-Linguistic Deficits , and Oropharyngeal Dysphagia   Prognosis: good    Clinical Assessment:  Pt continues to present with Mild  Dysarthria , Cognitive-Linguistic Deficits , and Oropharyngeal Dysphagia .  Pt is demonstrating progress with insight into deficits, attention towards tasks, and carry over of Pathologist    Marc SARAH,  Speech-Language Pathology     The speech-language pathologist was present, directed the patient's care, made skilled judgment and was responsible for assessment and treatment.

## 2022-10-21 NOTE — ACP (ADVANCE CARE PLANNING)
Advance Care Planning     Advance Care Planning Inpatient Note  [facility] Department    Today's Date: 10/21/2022  Unit: [facility] ACUTE REHAB UNIT    Received request from IDT Member. Upon review of chart and communication with care team, patient's decision making abilities are not in question. . Patient and Spouse was/were present in the room during visit. Goals of ACP Conversation:  Discuss advance care planning documents    Health Care Decision Makers:         No Decision Maker named by patient at this time    Juan Jose 52 (Patient Wishes):  None     Assessment:  Pt was alert and Orientated. Patient/spouse requested to review AD documents and complete when appropriate. Patient/spouse was given blank copies of POA and LW documents to review and complete. Pt/spouse was instructed to notify the nurse or contact [address] [place], [place] [address]ez [facility] when completed. Interventions:  Provided education on documents for clarity and greater understanding  Discussed and provided education on state decision maker hierarchy    Care Preferences Communicated:   No    Outcomes/Plan:  ACP Discussion: Postponed. Patient will notify the nurse when appropriate.     Electronically signed by Teresa Bentley on 10/21/2022 at 12:54 PM

## 2022-10-21 NOTE — PROGRESS NOTES
[facility] Department   Phone: [phone]    Occupational Therapy    [] Initial Evaluation            [x] Daily Treatment Note         [] Discharge Summary      Patient: Debora Lemos   : 1960   MRN: [id number]   Date of Service:  10/21/2022    Admitting Diagnosis:  Acute embolic stroke [facility])  Current Admission Summary: 60-year-old male with a history of diabetes, MI, HTN, HLD, diabetic neuropathy, and nonrheumatic aortic valve stenosis who was admitted to  on 10/11 for a scheduled TAVR procedure. The procedure was complicated by an episode of V. fib and postoperatively he was noted to have neurologic deficits and work-up revealed a thromboembolic stroke involving the left cerebral territories. Work-up including TTE was unremarkable for PFO. He was initiated on Eliquis for prophylaxis. Patient requires significant insulin at baseline with his normal home regimen of 80 units of Lantus with 20 units Humalog 3 times daily. A1c was 8.6. Due to decreased p.o. intake his regimen was adjusted. He was evaluated by therapy and suggested to continue in an inpatient setting prior to returning home  Past Medical History:  has a past medical history of Carbuncle, DM2 (diabetes mellitus, type 2) ([address] ED (erectile dysfunction), History of heart attack, HTN (hypertension), Hyperlipidemia, Peripheral neuropathy, Sleep apnea, and Wears glasses. Past Surgical History:  has a past surgical history that includes Cardiac catheterization (); Pilonidal cyst excision; cyst removal; cardiovascular stress test (2012); cardiovascular stress test (2019); back surgery; Colonoscopy (2021); and Colonoscopy (N/A, 2021).     Discharge Recommendations: [place] [facility] S3    DME Required For Discharge: DME to be determined pending patient progress; likely to require shower chair    Precautions/Restrictions: high fall risk, up as tolerated  Weight Bearing Restrictions: no restrictions    Required Braces/Orthotics: no braces required  Positional Restrictions:no positional restrictions    Pre-Admission Information   Lives With: spouse                  Type of Home: house  Home Layout: one level  Home Access:  5 step to enter without rails   Bathroom Layout: walker accessible, wheelchair accessible, tub/shower only  Bathroom Equipment: hand held shower head  Toilet Height: standard height- low  Home Equipment: single point cane, manual wheelchair  Transfer Assistance: Independent without use of device  Ambulation Assistance:Independent without use of device  ADL Assistance: independent with all ADL's with exception of assist to don socks- pt is IND w/ shoes pending shoe style   IADL Assistance: requires assistance with most homemaking tasks; pt IND to mow lawn on riding mower; IND with medications and light meal prep (microwave use/snacks)  Active :        [x] Yes                 [] No  Hand Dominance: [] Left                 [x] Right  Current Employment: retired.   Occupation: Engineering  Hobbies: Drive, fish, working on the car, yard work  [employer]: once a week in yard, has slipped a few times in shower        Subjective:   General: pt in recliner at entry, agreeable to session and shower, denied pain   Pain: 0/10  Pain Interventions: not applicable        Activities of Daily Living  Basic Activities of Daily Living  Grooming: minimal assistance  Grooming Comments: shaving w/c level w/ set-up; assist for HOHA to R for opening mouth wash  Upper Extremity Bathing: minimal assistance  Lower Extremity Bathing: moderate assistance   Bathing Comments: VC for meli-techniques for UB/LB, use of LHS for L underarm, feet/LE and buttocks; assist for thorough roberto hygiene and for drying LEs/feet  Upper Extremity Dressing: minimal assistance  Lower Extremity Dressing: moderate assistance  Dressing Comments: assist for recall of UB meli-dressing, assist to adjust down trunk on R and intermittent support to R UE; use of reacher to doff clothing, HOHA to problem solve use to thread Les- TD for socks, assist for threading fully B LE, assist to clothing over R hip. Toileting: minimal assistance. Toileting Comments: assist for clothing/balance in stance, pt unable to void on commode   General Comments: Pt ambulated to bathroom for ADL needs- post toileting attempt pt ambulated to shower for UB/LB bathing and dressing seated on TTB in shower. Transfer to w/c post for seated grooming. Instrumental Activities of Daily Living  No IADL completed on this date. Functional Mobility  Bed Mobility  Bed mobility not completed on this date. Comments:  Transfers  Sit to stand transfer:contact guard assistance  Stand to sit transfer: contact guard assistance, minimal assistance  Toilet transfer: contact guard assistance, minimal assistance  Toilet transfer equipment: standard toilet, grab bars  Shower transfer: contact guard assistance, minimal assistance  Shower transfer equipment: tub transfer bench, grab bars, walker  Comments: VC for sequence and R UE placing and removing in splint   Functional Mobility:  Sitting Balance: stand by assistance, contact guard assistance. Standing Balance: contact guard assistance. Functional Mobility: .  minimal assistance  Functional Mobility Activity: recliner>bathroom   Functional Mobility Device Use: rolling walker  Functional Mobility Comment: use of RW  splint for mobility     Second Session:  Pt in recliner at entry, agreeable to session, denied pain and declined ADL needs. All sit<>stands at [facility] w/ VC to slow/control descent. Pt ambulated recliner>w/c in hallway ~25ft w/ RW- CGA progressing to Baron d/t fatigue and slight R LE buckling w/ assist to correct.    Multiple TA completed w/ focus on Wbing and AROM to increase strength, coordination and functional use: AROM R UE 10 reps x1 of scap elevation, scap retraction, elbow flexion, supination<>pronation, thumb extension isolated, palmar grasp/extension (increased extensor activation noted in hand, partial supination achieved, scap w/ fair mobility); R UE arm glides to colored targets seated w/ targets placed across multiple planes; In stance to table to completed 2 reps of target taps- 1 rep w/ R UE in WB position while reaching with L hand (R knee block provided for support to reduce [place] of buckling), 2nd rep w/ R UE completed arm glides to targets. Pt tolerated well, increased fatigue noted, rest breaks as needed. SPT w/c>EOB at [facility]. Sit>supine w/ ModA (assist to LEs). Pt left in bed, bed alarm on, call light and needs in reach. Cognition  Overall Cognitive Status: WFL  Arousal/Alterness: appropriate responses to stimuli  Following Commands: follows all commands without difficulty  Attention Span: attends with cues to redirect  Safety Judgement: decreased awareness of need for assistance, decreased awareness of need for safety  Problem Solving: assistance required to implement solutions  Insights: fully aware of deficits  Initiation: requires cues for some  Sequencing: requires cues for some  Comment: cog appears overall WFL based on functional tasks completed during eval; dysarthric speech  Orientation:    alert and oriented x 4  Command Following:   [facility]     Education  Barriers To Learning: none  Patient Education: patient educated on goals, OT role and benefits, plan of care, ADL adaptive strategies, proper use of assistive device/equipment, adaptive device training, transfer training, discharge recommendations  Learning Assessment:  patient verbalizes understanding, would benefit from continued reinforcement    Assessment  Activity Tolerance: tolerated well but increased fatigue noted with shower level ADL; period of \"dizziness\" upon sitting in shower- /71, HR 65; pt reporting improved symptoms w/ rest, no further complaints throughout session.    Impairments Requiring Therapeutic Intervention: decreased functional mobility, decreased ADL status, decreased ROM, decreased strength, decreased safety awareness, decreased endurance, decreased balance, decreased IADL, decreased fine motor control, decreased coordination  Prognosis: good  Clinical Assessment: Pt is a 60 yr old male who presents post CVA w/ primarily R side and speech/language deficits. Pt is below baseline level of function d/t above deficits. Pt is progressing slowly, requiring ongoing training and education on meli-dressing and AE use. Tranfers and mobility assist levels are improving w/ pt beginning to manage R hand splint on RW. R hand demo's slightly increased flex/ext of digits. Pt is motivated and participates well within sessions. Pt continues to benefit from intensive inpt therapy to maximize functional independence and safety. Continue with POC.   Safety Interventions: patient left in chair, chair alarm in place, call light within reach, gait belt, and patient at risk for falls    Plan  Frequency: 5 x/week, 60 min/day  Current Treatment Recommendations: strengthening, ROM, balance training, functional mobility training, transfer training, endurance training, neuromuscular re-education, ADL/self-care training, IADL training, home management training, safety education, equipment evaluation/education, and positioning    Goals  Patient Goals: \"get back to driving\", regain IND with mobility    Short Term Goals:  Time Frame: 10-14 days  Patient will complete upper body ADL at supervision   Patient will complete lower body ADL at minimal assistance   Patient will complete toileting at minimal assistance   Patient will complete self-feeding at modified independent   Patient will complete grooming at modified independent   Patient will complete functional transfers at supervision     Long Term Goals:  Time Frame: 2-3 weeks   Patient will complete upper body ADL at modified independent   Patient will complete lower body ADL at modified independent   Patient will complete toileting at modified independent   Patient will complete functional transfers at modified independent   Patient will complete functional mobility at modified independent   Patient to gather and transport IADL items at supervision      -pt progressing towards goals  10/21    Therapy Session Time     Individual Group Co-treatment   Time In 0845      Time Out 0930      Minutes 45     Timed Code Treatment Minutes:      Second Session Therapy Time:   Individual Concurrent Group Co-treatment   Time In [address]         Minutes 30         Timed Code Treatment Minutes: 45 + 30  Total Treatment Minutes:   75         Electronically Signed By:Nahid Michael [id number]

## 2022-10-21 NOTE — PROGRESS NOTES
Lisa Loaiza  10/21/2022  [id number]    Chief Complaint: Acute embolic stroke ([address]    Subjective:   No overnight events. Reports poor sleep with melatonin. Didn't request trazodone. Willing to start SSRI for recovery. ROS: No CP, SOB, dyspnea    Objective:  Patient Vitals for the past 24 hrs:   BP Temp Temp src Pulse Resp SpO2   10/20/22 2000 (!) 111/58 98.4 °F (36.9 °C) Oral 60 16 92 %   10/20/22 0830 (!) 149/74 98.5 °F (36.9 °C) Oral 67 18 94 %       Gen: No distress, pleasant. Resting in bed  HEENT: Normocephalic, atraumatic. CV: Regular rate and rhythm. No MRG   Resp: No respiratory distress. CTAB   Abd: Soft, nontender nondistended  Ext: No edema. Neuro: Alert, oriented, appropriately interactive. Laboratory data: Available via EMR. Therapy progress:    PT    Supine to Sit: Partial/moderate assistance  Sit to Supine:     Sit to Stand: Partial/moderate assistance  Chair/Bed to Chair Transfer: Partial/moderate assistance  Car Transfer: Partial/moderate assistance  Ambulation 10 ft: Partial/moderate assistance  Ambulation 50 ft: Partial/moderate assistance  Ambulation 150 ft:    Stairs - 1 Step: Partial/moderate assistance  Stairs - 4 Step: Partial/moderate assistance  Stairs - 12 Step: Partial/moderate assistance    OT    Eating:    Oral Hygiene: Supervision or touching assistance  Bathing: Substantial/maximal assistance  Upper Body Dressing: Supervision or touching assistance  Lower Body Dressing: Partial/moderate assistance  Toilet Transfer: Partial/moderate assistance  Toilet Hygiene: Partial/moderate assistance    Speech Therapy    Pt presents with mild dysarthria, cognitive-linguistic deficits, and oropharyngeal dysphagia. Pt motor speech characterized by reduced R sided labial ROM, coordination, strength and symmetry contributing to decreased rate of speech with imprecise articulation. Deficits contribute to infrequent mild intelligibility.  This date, pt tolerated vital stim to target oral symmetry, labial and buccal strength, ROM and coordination and oral phase deficits including improvement with oral containment and ease of mastication. Pt with good awareness of deficits. Cognitive-linguistic deficits characterized by impaired short term recall, immediate/working memory, and attention to detail. Pt demonstrates good awareness of deficits and is able to self correct cognitive errors >50% of time. Recommend ongoing skilled speech intervention to help pt return to prior level of communication and cognitive functioning and to increase level of independence at d/c. Body mass index is 39.29 kg/m². Assessment:  Patient Active Problem List   Diagnosis    HTN (hypertension)    DM2 (diabetes mellitus, type 2) ([facility] [address]    High cholesterol    Acute embolic stroke ([facility] [address]       Plan:   Left cerebral hemisphere thromboembolic infarcts: Eliquis, statin. PT/OT/SLP. - start lexapro     Nonrheumatic aortic valve stenosis: S/p TAVR on 10/11. Eliquis     DM: A1c 8.6, Lantus 40 (80), prandial 10 (20), and SSI     DM neuropathy: no current meds     CAD: eliquis, statin     HTN: atenolol 25, lisinopril 5, HCTZ 12.5     HLD: lipitor 40     Hypokalemia: supplement     Bowels: Per protocol  Bladder: Per protocol   Sleep: Trazodone provided prn. Pain: tylenol, tramadol, robaxin PRN   DVT PPx: Eliquis   KAYE: 11/1    Antonina Mortimer, MD 10/21/2022, 7:56 AM    * This document was created using dictation software. While all precautions were taken to ensure accuracy, errors may have occurred. Please disregard any typographical errors.

## 2022-10-21 NOTE — PROGRESS NOTES
[facility] Department   Phone: [phone]    Physical Therapy    [] Initial Evaluation            [x] Daily Treatment Note         [] Discharge Summary      Patient: James Wheatley   : 1960   MRN: [id number]   Date of Service:  10/21/2022  Admitting Diagnosis: Acute embolic stroke [facility])  Current Admission Summary: 74-year-old male with a history of diabetes, MI, HTN, HLD, diabetic neuropathy, and nonrheumatic aortic valve stenosis who was admitted to  on 10/11 for a scheduled TAVR procedure. The procedure was complicated by an episode of V. fib and postoperatively he was noted to have neurologic deficits and work-up revealed a thromboembolic stroke involving the left cerebral territories. Work-up including TTE was unremarkable for PFO. He was initiated on Eliquis for prophylaxis. Patient requires significant insulin at baseline with his normal home regimen of 80 units of Lantus with 20 units Humalog 3 times daily. A1c was 8.6. Due to decreased p.o. intake his regimen was adjusted. He was evaluated by therapy and suggested to continue in an inpatient setting prior to returning home. He reports he is frustrated by his situation but motivated to recover. Past Medical History:  has a past medical history of Carbuncle, DM2 (diabetes mellitus, type 2) ([address] ED (erectile dysfunction), History of heart attack, HTN (hypertension), Hyperlipidemia, Peripheral neuropathy, Sleep apnea, and Wears glasses. Past Surgical History:  has a past surgical history that includes Cardiac catheterization (); Pilonidal cyst excision; cyst removal; cardiovascular stress test (2012); cardiovascular stress test (2019); back surgery; Colonoscopy (2021); and Colonoscopy (N/A, 2021).   Discharge Recommendations: home with home health PT  DME Required For Discharge: DME to be determined pending patient progress  Precautions/Restrictions: high fall risk  Weight Bearing Restrictions: weight bearing as tolerated  [] Right Upper Extremity  [] Left Upper Extremity [] Right Lower Extremity  [] Left Lower Extremity     Required Braces/Orthotics: no braces required   [] Right  [] Left  Positional Restrictions:no positional restrictions    Pre-Admission Information   Lives With: spouse    Type of Home: house  Home Layout: one level  Home Access:  5 step to enter without rails   Bathroom Layout: walker accessible, wheelchair accessible, tub only  Bathroom Equipment: hand held shower head, will use shower chair  Toilet Height: standard height- low  Home Equipment: single point cane, manual wheelchair  Transfer Assistance: Independent without use of device  Ambulation Assistance:Independent without use of device  ADL Assistance: independent with all ADL's, help with putting on socks  IADL Assistance: requires assistance with all homemaking tasks, would mow at home, handles his own medications  Active :        [x] Yes  [] No  Hand Dominance: [] Left  [x] Right  Current Employment: retired. Occupation: Engineering  Hobbies: Drive, fish, working on the car, yard work  [employer]: once a week in yard, has slipped a few times in shower        Subjective  General: Pt in bed on arrival.  Pants half down. States he had trouble getting them up, so he left them that way. Pain: 0/10  Pain Interventions: not applicable       Functional Mobility  Bed Mobility  Supine to Sit: contact guard assistance  Rolling Right: stand by assistance  Comments: Required heavy use of handrail, and required multiple trials to sit himself up and adjust his positioning  Transfers  Sit to stand transfer: contact guard assistance, minimal assistance  Stand to sit transfer: contact guard assistance, minimal assistance  Stand pivot transfer: contact guard assistance, minimal assistance  Comments: Inconsistent with transfers and varying levels of assistance between CGA-Amarilis with each one. Ambulation  Surface:level surface  Assistive Device: rolling walker  Other Appliance: (R), dorsiflex assist, walker splint attachment  Assistance: contact guard assistance, minimal assistance  Distance: 94' ft  Gait Mechanics: Inconsistent skip speed causing fall risk, decreased R step length and height. Required verbal cues to increase R step length and height. Improved ability to push walker on his own, improved R heel strike with dorsiflex assist  Comments: A RW hand splint was utilized to assist R hand with gripping walker. Ace wrap was also used to assist with dorsiflexion. As pt became fatigued, his skip became inconsistent, reduced step length on the R, and he had difficulty pushing the walker straight. Pt required quick standing breaks to attempt to regroup himself. Stair Mobility  Stair mobility not completed on this date. Comments:   Wheelchair Mobility:  No w/c mobility completed on this date. Comments:  Balance  Static Sitting Balance: fair (-): maintains balance at CGA with use of UE support  Dynamic Sitting Balance: fair (-): maintains balance at CGA with use of UE support  Static Standing Balance: fair (-): maintains balance at CGA with use of UE support  Dynamic Standing Balance: fair (-): maintains balance at CGA with use of UE support  Comments: Very unsteady with transfers and ambulation. Has extensive history of falls. Other Therapeutic Interventions  First Session: Pt in bed on arrival. Pt reports he did not sleep well last night, MD notified. Pt's pants were down on his R, and he reported that he was unable to get them up himself so he just left them that way. Pt performed bed mobility as noted above. PT performed sit to stand with RW and CGA and was able to get his pants up himself with some assistance pulling them over his R hip. Pt performed ambulation to the gym as noted above. As pt turned into the gym, he lost balance to the R and required modA to regain balance.  Pt performed sidestepping at the ballet bars with B UE assist and CGA. Pt was provided verbal cues on adjusting his feet placement as his were too narrow. Pt also compensated when side-stepping to the R, as his foot would ER as opposed to remaining straight. Pt also performed sit to stands with L foot placed on 4\" step in order for him to force him to use his R LE more. Pt required blocking of his R foot and knee, and Amarilis to perform sit to stand. Pt also occasionally required multiple trials in order to perform sit to stand as he became fatigued. Pt performed X 10 seated cone taps on the R, and required facilitation to help control his foot to the cone. Pt compensation for this task was excessive trunk extension, as the wc would slightly tip back. Pt was taken back to his room, and a stand pivot transfer with Amarilis was utilized to take him from the  to his recliner. Pt was left with alarm on and all needs within reach. Second Session: Pt sitting in recliner upon arrival with visitor. Pt performed stand pivot with CGA from recliner to . Pt performed another stand pivot with Amarilis from  to bike. Pt performed 8 min on bike with emphasis on control and full ROM of R LE. Pt performed another stand pivot transfer with Amarilis back to the . Performed X 11 chest press with stability ball, X 11 trunk rotations with stability ball, X 11 stability ball to floor with transition to ceiling, and X 11 B UE chops with stability ball. When using the stability ball for each exercise, pt required assistance to maintain firm, and full grasp of the ball with R hand. Pt was returned to room and performed stand pivot with CGA to return to recliner. Pt was left with alarm on and all needs within reach.      Functional Outcomes                 Cognition  WFL  Orientation:    alert and oriented x 4  Command Following:   [facility]    Education  Barriers To Learning: none  Patient Education: patient educated on goals, PT role and benefits, general safety, functional mobility training, proper use of assistive device/equipment, transfer training  Learning Assessment:  patient verbalizes and demonstrates understanding    Assessment  Activity Tolerance: decreased endurance  Impairments Requiring Therapeutic Intervention: decreased functional mobility, decreased ADL status, decreased ROM, decreased strength, decreased safety awareness, decreased endurance, decreased sensation, decreased balance  Prognosis: good  Clinical Assessment:  Pt continues to present with impaired functional mobility and R sided UE/LE strength and ROM deficits. Pt's deficits and compensation for those deficits worsen as he becomes more fatigued. Pt continues to require skilled ARU physical therapy services in order to address deficits and promote independence to allow the patient to safely succeed in his home environment.    Safety Interventions: patient left in chair, chair alarm in place, call light within reach, and patient at risk for falls    Plan  Frequency: 5 x/week, 90 min/day  Current Treatment Recommendations: strengthening, ROM, balance training, functional mobility training, transfer training, gait training, stair training, endurance training, neuromuscular re-education, ADL/self-care training, and home management training    Goals  Patient Goals: Get back to driving, get back to moving around without falling   Short Term Goals:  Time Frame: 2 weeks  Patient will complete bed mobility at modified independent   Patient will complete transfers at [facility]   Patient will ambulate 150 ft with use of LRAD at [facility]  Patient will ascend/descend 8 stairs with (B) handrail at stand by assistance  Patient will complete car transfer at [facility]    Therapy Session Time      Individual Group Co-treatment   Time In  730      Time Out  815      Minutes  45        Timed Code Treatment Minutes:  45    Second Session Therapy Time: Individual Concurrent Group Co-treatment   Time In  1115         Time Out  1145         Minutes  30           Timed Code Treatment Minutes:  41+81      Total Treatment Minutes:  76       Electronically Signed By: EARL Asencio SPT  Therapist was present, directed the patient's care, made skilled judgement, and was responsible for assessment and treatment of the patient. Co-signed and supervised by:  Myah Stanton DPT [id number]

## 2022-10-21 NOTE — PLAN OF CARE
Problem: Pain  Goal: Verbalizes/displays adequate comfort level or baseline comfort level  10/20/2022 2310 by Clint Kelley RN  Outcome: Progressing

## 2022-10-22 LAB
GLUCOSE BLD-MCNC: 105 MG/DL (ref 70–99)
GLUCOSE BLD-MCNC: 112 MG/DL (ref 70–99)
GLUCOSE BLD-MCNC: 126 MG/DL (ref 70–99)
GLUCOSE BLD-MCNC: 181 MG/DL (ref 70–99)
GLUCOSE BLD-MCNC: 186 MG/DL (ref 70–99)
GLUCOSE BLD-MCNC: 378 MG/DL (ref 70–99)
PERFORMED ON: ABNORMAL

## 2022-10-22 PROCEDURE — 94760 N-INVAS EAR/PLS OXIMETRY 1: CPT

## 2022-10-22 PROCEDURE — 97130 THER IVNTJ EA ADDL 15 MIN: CPT

## 2022-10-22 PROCEDURE — 97530 THERAPEUTIC ACTIVITIES: CPT

## 2022-10-22 PROCEDURE — 6370000000 HC RX 637 (ALT 250 FOR IP): Performed by: PHYSICAL MEDICINE & REHABILITATION

## 2022-10-22 PROCEDURE — 97116 GAIT TRAINING THERAPY: CPT

## 2022-10-22 PROCEDURE — 1280000000 HC REHAB R&B

## 2022-10-22 PROCEDURE — 97112 NEUROMUSCULAR REEDUCATION: CPT

## 2022-10-22 PROCEDURE — 97110 THERAPEUTIC EXERCISES: CPT

## 2022-10-22 PROCEDURE — 97535 SELF CARE MNGMENT TRAINING: CPT

## 2022-10-22 PROCEDURE — 97129 THER IVNTJ 1ST 15 MIN: CPT

## 2022-10-22 RX ADMIN — LISINOPRIL 5 MG: 5 TABLET ORAL at 10:48

## 2022-10-22 RX ADMIN — CAPSAICIN: 0.25 CREAM TOPICAL at 10:49

## 2022-10-22 RX ADMIN — APIXABAN 5 MG: 5 TABLET, FILM COATED ORAL at 10:49

## 2022-10-22 RX ADMIN — CAPSAICIN: 0.25 CREAM TOPICAL at 20:58

## 2022-10-22 RX ADMIN — ATORVASTATIN CALCIUM 40 MG: 40 TABLET, FILM COATED ORAL at 20:58

## 2022-10-22 RX ADMIN — POTASSIUM CHLORIDE 20 MEQ: 1500 TABLET, EXTENDED RELEASE ORAL at 10:48

## 2022-10-22 RX ADMIN — APIXABAN 5 MG: 5 TABLET, FILM COATED ORAL at 20:58

## 2022-10-22 RX ADMIN — INSULIN LISPRO 10 UNITS: 100 INJECTION, SOLUTION INTRAVENOUS; SUBCUTANEOUS at 17:02

## 2022-10-22 RX ADMIN — ATENOLOL 25 MG: 50 TABLET ORAL at 10:49

## 2022-10-22 RX ADMIN — METHOCARBAMOL 500 MG: 500 TABLET ORAL at 13:31

## 2022-10-22 RX ADMIN — INSULIN GLARGINE 40 UNITS: 100 INJECTION, SOLUTION SUBCUTANEOUS at 21:15

## 2022-10-22 RX ADMIN — INSULIN LISPRO 10 UNITS: 100 INJECTION, SOLUTION INTRAVENOUS; SUBCUTANEOUS at 13:31

## 2022-10-22 RX ADMIN — HYDROCHLOROTHIAZIDE 12.5 MG: 25 TABLET ORAL at 10:47

## 2022-10-22 RX ADMIN — ESCITALOPRAM OXALATE 5 MG: 10 TABLET ORAL at 10:48

## 2022-10-22 ASSESSMENT — PAIN DESCRIPTION - DESCRIPTORS
DESCRIPTORS: ACHING
DESCRIPTORS: ACHING;DISCOMFORT

## 2022-10-22 ASSESSMENT — PAIN SCALES - GENERAL
PAINLEVEL_OUTOF10: 8
PAINLEVEL_OUTOF10: 4
PAINLEVEL_OUTOF10: 0
PAINLEVEL_OUTOF10: 0
PAINLEVEL_OUTOF10: 2
PAINLEVEL_OUTOF10: 0
PAINLEVEL_OUTOF10: 7
PAINLEVEL_OUTOF10: 0
PAINLEVEL_OUTOF10: 10

## 2022-10-22 ASSESSMENT — PAIN DESCRIPTION - ORIENTATION
ORIENTATION: LOWER
ORIENTATION: LOWER

## 2022-10-22 ASSESSMENT — PAIN DESCRIPTION - ONSET
ONSET: ON-GOING
ONSET: ON-GOING

## 2022-10-22 ASSESSMENT — PAIN DESCRIPTION - LOCATION
LOCATION: BACK
LOCATION: BACK

## 2022-10-22 ASSESSMENT — PAIN DESCRIPTION - FREQUENCY
FREQUENCY: CONTINUOUS
FREQUENCY: INTERMITTENT

## 2022-10-22 ASSESSMENT — PAIN DESCRIPTION - PAIN TYPE
TYPE: CHRONIC PAIN
TYPE: CHRONIC PAIN

## 2022-10-22 NOTE — PROGRESS NOTES
Marisa Loaiza  10/22/2022  [id number]    Chief Complaint: Acute embolic stroke ([address]    Subjective:   No overnight events. No new c/o this morning. ROS: No CP, SOB, dyspnea    Objective:  Patient Vitals for the past 24 hrs:   BP Temp Temp src Pulse Resp SpO2   10/22/22 1035 (!) 151/75 98.3 °F (36.8 °C) Oral 69 19 94 %   10/21/22 2000 116/62 98.6 °F (37 °C) Oral 59 18 96 %   10/21/22 1315 127/69 97.3 °F (36.3 °C) Oral 61 16 97 %       Gen: No distress, pleasant. Resting in bed  HEENT: Normocephalic, atraumatic. CV: Regular rate and rhythm. No MRG   Resp: No respiratory distress. CTAB   Abd: Soft, nontender nondistended  Ext: No edema. Neuro: Alert, oriented, appropriately interactive. Laboratory data: Available via EMR. Therapy progress:    PT    Supine to Sit: Partial/moderate assistance  Sit to Supine:     Sit to Stand: Partial/moderate assistance  Chair/Bed to Chair Transfer: Partial/moderate assistance  Car Transfer: Partial/moderate assistance  Ambulation 10 ft: Partial/moderate assistance  Ambulation 50 ft: Partial/moderate assistance  Ambulation 150 ft:    Stairs - 1 Step: Partial/moderate assistance  Stairs - 4 Step: Partial/moderate assistance  Stairs - 12 Step: Partial/moderate assistance    OT    Eating:    Oral Hygiene: Supervision or touching assistance  Bathing: Partial/moderate assistance  Upper Body Dressing: Partial/moderate assistance  Lower Body Dressing: Partial/moderate assistance  Toilet Transfer: Partial/moderate assistance  Toilet Hygiene: Partial/moderate assistance    Speech Therapy    Pt continues to present with Mild  Dysarthria , Cognitive-Linguistic Deficits , and Oropharyngeal Dysphagia . Pt is demonstrating progress with insight into deficits, attention towards tasks, and carry over of new information.   Continue to target right oral motor strength and coordination to facilitate speech precision, safe diet tolerance; and cognitive tasks for safe return to prior level of independence. Body mass index is 39.29 kg/m². Assessment:  Patient Active Problem List   Diagnosis    HTN (hypertension)    DM2 (diabetes mellitus, type 2) ([facility]    High cholesterol    Acute embolic stroke ([facility]       Plan:   Left cerebral hemisphere thromboembolic infarcts: Eliquis, statin. PT/OT/SLP. - started lexapro     Nonrheumatic aortic valve stenosis: S/p TAVR on 10/11. Eliquis     DM: A1c 8.6, Lantus 40 (80), prandial 10 (20), and SSI     DM neuropathy: no current meds     CAD: eliquis, statin     HTN: atenolol 25, lisinopril 5, HCTZ 12.5     HLD: lipitor 40     Hypokalemia: supplement     Bowels: Per protocol  Bladder: Per protocol   Sleep: Trazodone provided prn. Pain: tylenol, tramadol, robaxin PRN   DVT PPx: Eliquis   KAYE: 11/1    Bronson Santamaria MD  10/22/2022, 10:45 AM    * This document was created using dictation software. While all precautions were taken to ensure accuracy, errors may have occurred. Please disregard any typographical errors.

## 2022-10-22 NOTE — PROGRESS NOTES
[facility]   Phone: [phone]    Speech Therapy    [] Initial Evaluation            [x] Daily Treatment Note         [] Discharge Summary      Patient: Yosef Woo   : 1960   MRN: [id number]   Date of Service:  10/22/2022  Admitting Diagnosis: Acute embolic stroke [facility])  Current Admission Summary: 76-year-old male with a history of diabetes, MI, HTN, HLD, diabetic neuropathy, and nonrheumatic aortic valve stenosis who was admitted to  on 10/11 for a scheduled TAVR procedure. The procedure was complicated by an episode of V. fib and postoperatively he was noted to have neurologic deficits and work-up revealed a thromboembolic stroke involving the left cerebral territories. Work-up including TTE was unremarkable for PFO. He was initiated on Eliquis for prophylaxis. Patient requires significant insulin at baseline with his normal home regimen of 80 units of Lantus with 20 units Humalog 3 times daily. A1c was 8.6. Due to decreased p.o. intake his regimen was adjusted. He was evaluated by therapy and suggested to continue in an inpatient setting prior to returning home. He reports he is frustrated by his situation but motivated to recover. Past Medical History:  has a past medical history of Carbuncle, DM2 (diabetes mellitus, type 2) ([address] ED (erectile dysfunction), History of heart attack, HTN (hypertension), Hyperlipidemia, Peripheral neuropathy, Sleep apnea, and Wears glasses. Past Surgical History:  has a past surgical history that includes Cardiac catheterization (); Pilonidal cyst excision; cyst removal; cardiovascular stress test (2012); cardiovascular stress test (2019); back surgery; Colonoscopy (2021); and Colonoscopy (N/A, 2021).   Precautions/Restrictions: high fall risk, up as tolerated  Instrumental Swallow Study: FEES at outside hospital completed on 10/12/2022      Pre-Admission Information   Living Status: Pt lives with his significant other Lana Danielle), a dog and cat. 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  Vision:    Vision Corrective Device: wears glasses for reading      Subjective  General:   Session 1: Pt alert, seated upright in his chair with significant other present. Pt motivated to participate in therapy. Appropriately attended to tasks. Session 2: Pt awake and alert in chair. Pt motivated to participate in therapy and appropriately attended to tasks despite several interruptions. Pain: Session 1: no reports of pain. Session 2: reported 10/10 pain within his back. RN present/aware. Pt declined medication or repositioning. Safety Interventions: patient left in chair, chair alarm in place, and family/caregiver present (for first session)       Therapeutic Interventions:     Session 1:   Dysphagia: Severity: Mild  and Moderate   Goal not targeted this session  Pt reports having decreased respiratory/swallowing coordination during his AM meal, resulting in a coughing episode (prior to SLP's arrival). Pt's significant other reports that the pt was talking with her while also trying to eat. SLP completed education with the pt to recommendations to avoid communication while eating his meal to reduce his risk of penetration/aspiration. Motor Speech: Severity: Mild  and Moderate   Goal not targeted this session    Cognition: Severity: Mild   Orientation:   -Pt oriented to month, year, JANUSZ, date, and his current location, 100% independently.   -Pt encouraged to use his visual aids within his room to assist with orientation.       Fx Recall    - Pt provided extensive detail regarding his recent medical history     Mental Manipulation (3 targets, largest number)   -100% accuracy (10/10) independently     Mental Manipulation (3 targets, increasing order)   -100% accuracy (10/10) independently      Category matrix  -100% accuracy (16/16) with mild verbal cues required from the pt's significant other for task progression and a single verbal cue from the SLP for a correction. -pt using non dominate hand to write (L hand) due to right sided weakness. Pt handwriting ~80-90% legible to SLP    Verbal Functional Math Problems  -62.5% accuracy (5/8) with moderate verbal cues required for corrections  -Pt reports \"I'm guessing\" while completing this task    Additional Interventions:    Session 2:   Dysphagia: Severity: Mild  and Moderate   Pt was presented with his whole medications and thin water via straw by his RN during this session. Pt demonstrated dry and strong coughing immediately following intake of all of his medications together. Pt reported that his coughing was due to the water being cold. Recommend further assessment of pt's swallow response with medications and coaching in compensatory strategies to reduce his risk of penetration/aspiration. Motor Speech: Severity: Mild  and Moderate   Goal not targeted this session    Cognition: Severity: Mild   Verbal Problem Solving (who to contact?) for various situations upon discharge home:  -100% accuracy (10/10) independently     Written Task/Questions Regarding A Bill:  -100% accuracy (10/10) independently  -pt using non dominate hand to write (L hand) due to right sided weakness. Pt handwriting ~80-90% legible to SLP    Mental Manipulation Task (3 targets, progressive order)  -100% accuracy (10/10) independently    Additional Interventions: Pt demonstrated appropriate attention to cognitive tasks and conversation with SLP/RN despite multiple interruptions throughout this session.         Education  Barriers To Learning: none  Patient Education: Provided education regarding role of SLP, rationale for treatment tasks and general speech pathology plan of care. Learning Assessment: Pt verbalized understanding     Assessment  Impairments Requiring Therapeutic Intervention: Dysarthria , Cognitive-Linguistic Deficits , and Oropharyngeal Dysphagia   Prognosis: good    Clinical Assessment:  Pt continues to present with Mild  Dysarthria , Cognitive-Linguistic Deficits , and Oropharyngeal Dysphagia . Pt is demonstrating progress with insight into deficits, attention towards tasks, and carry over of new information. Pt may benefit from further assessment of his swallow while taking medications and coaching in compensatory strategies to promote increased safety. Continue to target right oral motor strength and coordination to facilitate speech precision, safe diet tolerance; and cognitive tasks for safe return to prior level of independence. Diet Solids Recommendation:   Liquid Consistency Recommendation:   Recommended Form of Meds:   Regular texture diet     Thin liquids     Meds whole with water       Recommended Compensatory Swallowing Strategies: Upright as possible with all PO intake , No straws , Small bites/sips , Eat/feed slowly, Remain upright 30-45 min       Plan  Frequency: 60 minutes/day; 5 days per week, as tolerated, until goals met, or discharged from ARU. Therapeutic Interventions: Diet Tolerance Monitoring  Speech / Motor Planning / Voice intervention  and Cognitive-Linguistic intervention   Discharge Recommendations: Home independently  Continued SLP at Discharge: TBD based upon progress     Goals  Patient Goals: \"improve my speech\"   Time Frame: 2 weeks     Pt will tolerate recommended diet and advanced trials with no overt s/s of aspiration. Ongoing  Pt will improve oral motor strength and ROM for coordinated and alternating motions, via graded tasks to improve speech back to baseline level as subjectively rated by SLP/pt and family.  Ongoing   Pt will improve executive function for multifactor task completion via graded tasks to 80% accuracy. Ongoing   Pt will improve attention to detail for graded complex information to 80%, for improved recall and retention of newly learned information. Ongoing       Therapy Session Time      Session 1 Session 2   Time In 0930 1030   Time Out 1000 1102   Time Code Minutes 30 32   Individual Minutes 30 32     Timed Code Treatment Minutes: 62  Total Treatment Minutes:  58    Electronically Signed By:  Bar Baeza M.S. [id number] [facility] [id number]  10/22/2022 12:42 PM

## 2022-10-22 NOTE — PLAN OF CARE
Problem: Discharge Planning  Goal: Discharge to home or other facility with appropriate resources  Outcome: Progressing     Problem: Skin/Tissue Integrity  Goal: Absence of new skin breakdown  Outcome: Progressing     Problem: Safety - Adult  Goal: Free from fall injury  Outcome: Progressing     Problem: ABCDS Injury Assessment  Goal: Absence of physical injury  Outcome: Progressing     Problem: Pain  Goal: Verbalizes/displays adequate comfort level or baseline comfort level  Outcome: Progressing     Problem: Chronic Conditions and Co-morbidities  Goal: Patient's chronic conditions and co-morbidity symptoms are monitored and maintained or improved  Outcome: Progressing

## 2022-10-22 NOTE — PROGRESS NOTES
2132 Glucose reading 64 ; gave 4oz apple juice. No signs or symptoms of hypoglycemia. 2205 glucose reading 81; no signs or symptoms of hypoglycemia. Patient is resting in bed with call light in hand.

## 2022-10-22 NOTE — PROGRESS NOTES
[facility] Department   Phone: [phone]    Physical Therapy    [] Initial Evaluation            [x] Daily Treatment Note         [] Discharge Summary      Patient: Jacqueline David   : 1960   MRN: [id number]   Date of Service:  10/22/2022  Admitting Diagnosis: Acute embolic stroke [facility])  Current Admission Summary: 68-year-old male with a history of diabetes, MI, HTN, HLD, diabetic neuropathy, and nonrheumatic aortic valve stenosis who was admitted to  on 10/11 for a scheduled TAVR procedure. The procedure was complicated by an episode of V. fib and postoperatively he was noted to have neurologic deficits and work-up revealed a thromboembolic stroke involving the left cerebral territories. Work-up including TTE was unremarkable for PFO. He was initiated on Eliquis for prophylaxis. Patient requires significant insulin at baseline with his normal home regimen of 80 units of Lantus with 20 units Humalog 3 times daily. A1c was 8.6. Due to decreased p.o. intake his regimen was adjusted. He was evaluated by therapy and suggested to continue in an inpatient setting prior to returning home. He reports he is frustrated by his situation but motivated to recover. Past Medical History:  has a past medical history of Carbuncle, DM2 (diabetes mellitus, type 2) ([address] ED (erectile dysfunction), History of heart attack, HTN (hypertension), Hyperlipidemia, Peripheral neuropathy, Sleep apnea, and Wears glasses. Past Surgical History:  has a past surgical history that includes Cardiac catheterization (); Pilonidal cyst excision; cyst removal; cardiovascular stress test (2012); cardiovascular stress test (2019); back surgery; Colonoscopy (2021); and Colonoscopy (N/A, 2021).   Discharge Recommendations: home with home health PT  DME Required For Discharge: DME to be determined pending patient progress  Precautions/Restrictions: high fall risk  Weight Bearing Restrictions: weight bearing as tolerated  [] Right Upper Extremity  [] Left Upper Extremity [] Right Lower Extremity  [] Left Lower Extremity     Required Braces/Orthotics: no braces required   [] Right  [] Left  Positional Restrictions:no positional restrictions    Pre-Admission Information   Lives With: spouse    Type of Home: house  Home Layout: one level  Home Access:  5 step to enter without rails   Bathroom Layout: walker accessible, wheelchair accessible, tub only  Bathroom Equipment: hand held shower head, will use shower chair  Toilet Height: standard height- low  Home Equipment: single point cane, manual wheelchair  Transfer Assistance: Independent without use of device  Ambulation Assistance:Independent without use of device  ADL Assistance: independent with all ADL's, help with putting on socks  IADL Assistance: requires assistance with all homemaking tasks, would mow at home, handles his own medications  Active :        [x] Yes  [] No  Hand Dominance: [] Left  [x] Right  Current Employment: retired. Occupation: Engineering  Hobbies: Drive, fish, working on the car, yard work  [address] [place] [address] Nw: once a week in yard, has slipped a few times in shower        Subjective  General: Pt supine in flat bed on arrival.  Pants half down. States he had trouble getting them up, so he left them that way. Pain: 0/10  Pain Interventions: not applicable       Functional Mobility  Bed Mobility  Supine to Sit: stand by assistance  Rolling Right: stand by assistance  Scooting: stand by assistance  Comments: Required heavy use of handrail, increased time, and required multiple trials to sit himself up and adjust his positioning  Transfers  Sit to stand transfer: contact guard assistance  Stand to sit transfer: contact guard assistance, minimal assistance  Comments: 4 total sit<>stands.  decreased eccentric control for stand to sits; VC to utilized R UE when sitting for safety and to gain feedback - states  \"I didn't forget about my R hand, but it was easier to just use my L hand. \"    Ambulation  Surface:level surface  Assistive Device: rolling walker  Other Appliance: (R), walker splint attachment  Assistance: contact guard assistance, minimal assistance, moderate assistance - varied  Distance: 58' + 94'   Gait Mechanics: Inconsistent skip speed causing fall risk, decreased R step length and height, R external rotation, decreased R knee bend and heel strike. Required verbal/tactile cues to increase R step length and height and prevent hyperextension. Improved ability to push walker on his own, improved R heel strike with dorsiflex assist  Comments: A RW hand splint was utilized to assist R hand with gripping walker. As pt became fatigued, his skip became inconsistent, reduced step length on the R, and he had difficulty pushing the walker straight. Pt required quick standing breaks to attempt to regroup himself. Stair Mobility  Stair mobility not completed on this date. Comments:   Wheelchair Mobility:  No w/c mobility completed on this date. Comments:  Balance  Static Sitting Balance: fair (-): maintains balance at CGA with use of UE support  Dynamic Sitting Balance: fair (-): maintains balance at CGA with use of UE support  Static Standing Balance: fair (-): maintains balance at CGA with use of UE support  Dynamic Standing Balance: fair (-): maintains balance at CGA with use of UE support  Comments: No LOB. Occasionally requires Min A/Mod A michael to R knee buckle while standing. Other Therapeutic Interventions  First Session: Supine: AP's x10 B, QS  x10 B. Pre-gait ~90 sec prior to ambulation. Ballet bars: standing marches/high knees on blue airex 2 x10. Heelraises x10 B. Crossing midline with toe taps anteriorly and posteriorly with B LE 2 x10. Seated rest break required between each activity. Second Session: Denies pain. Reports being tired, but enthusiastic and very willing to participate in therapy. Sit<>stands Min A improving to CGA, requiring VC and demonstration for nose over toes. Min A/CGA for ambulation with RW 94'. Toe taps on 6\" step B 2 x10 requiring VC/TC for R foot placement and blocking of R knee in stance phase. Ascend/descend 3 6\" steps B HR CGA and max VC/TC for sequencing. Pt fatigued by end of tx. CGA for 2 SPT's recliner<>w/c. Needs and call light in reacch. Alarm on. RN aware. Functional Outcomes                 Cognition  WFL  Orientation:    alert and oriented x 4  Command Following:   [facility]    Education  Barriers To Learning: none  Patient Education: patient educated on goals, PT role and benefits, general safety, functional mobility training, proper use of assistive device/equipment, transfer training  Learning Assessment:  patient verbalizes and demonstrates understanding    Assessment  Activity Tolerance: decreased endurance  Impairments Requiring Therapeutic Intervention: decreased functional mobility, decreased ADL status, decreased ROM, decreased strength, decreased safety awareness, decreased endurance, decreased sensation, decreased balance  Prognosis: good  Clinical Assessment:  Pt continues to present with impaired functional mobility and R sided UE/LE strength and ROM deficits. Pt's deficits and compensation for those deficits worsen as he becomes more fatigued. Pt continues to require skilled ARU physical therapy services in order to address deficits and promote independence to allow the patient to safely succeed in his home environment.    Safety Interventions: patient left in chair, chair alarm in place, call light within reach, and patient at risk for falls    Plan  Frequency: 5 x/week, 90 min/day  Current Treatment Recommendations: strengthening, ROM, balance training, functional mobility training, transfer training, gait training, stair training, endurance training, neuromuscular re-education, ADL/self-care training, and home management training    Goals  Patient Goals: Get back to driving, get back to moving around without falling   Short Term Goals:  Time Frame: 2 weeks  Patient will complete bed mobility at modified independent   Patient will complete transfers at [facility]   Patient will ambulate 150 ft with use of LRAD at [facility]  Patient will ascend/descend 8 stairs with (B) handrail at stand by assistance  Patient will complete car transfer at [facility]    Therapy Session Time      Individual Group Co-treatment   Time In 0730      Time Out 0817      Minutes 47        Timed Code Treatment Minutes:  47    Second Session Therapy Time:   Individual Concurrent Group Co-treatment   Time In 1102         Time Out 1132         Minutes 30           Timed Code Treatment Minutes:  47 + 30    Total Treatment Minutes:   77  Electronically Signed By: Geronimo Mendez PTA

## 2022-10-22 NOTE — PROGRESS NOTES
[facility] Department   Phone: [phone]    Occupational Therapy    [] Initial Evaluation            [x] Daily Treatment Note         [] Discharge Summary      Patient: Shea Awad   : 1960   MRN: [id number]   Date of Service:  10/22/2022    Admitting Diagnosis:  Acute embolic stroke [facility])  Current Admission Summary: 71-year-old male with a history of diabetes, MI, HTN, HLD, diabetic neuropathy, and nonrheumatic aortic valve stenosis who was admitted to  on 10/11 for a scheduled TAVR procedure. The procedure was complicated by an episode of V. fib and postoperatively he was noted to have neurologic deficits and work-up revealed a thromboembolic stroke involving the left cerebral territories. Work-up including TTE was unremarkable for PFO. He was initiated on Eliquis for prophylaxis. Patient requires significant insulin at baseline with his normal home regimen of 80 units of Lantus with 20 units Humalog 3 times daily. A1c was 8.6. Due to decreased p.o. intake his regimen was adjusted. He was evaluated by therapy and suggested to continue in an inpatient setting prior to returning home  Past Medical History:  has a past medical history of Carbuncle, DM2 (diabetes mellitus, type 2) ([address] ED (erectile dysfunction), History of heart attack, HTN (hypertension), Hyperlipidemia, Peripheral neuropathy, Sleep apnea, and Wears glasses. Past Surgical History:  has a past surgical history that includes Cardiac catheterization (); Pilonidal cyst excision; cyst removal; cardiovascular stress test (2012); cardiovascular stress test (2019); back surgery; Colonoscopy (2021); and Colonoscopy (N/A, 2021).     Discharge Recommendations: [facility] OT S3    DME Required For Discharge: DME to be determined pending patient progress; likely to require shower chair    Precautions/Restrictions: high fall risk, up as tolerated  Weight Bearing Restrictions: no restrictions    Required Braces/Orthotics: no braces required  Positional Restrictions:no positional restrictions    Pre-Admission Information   Lives With: spouse                  Type of Home: house  Home Layout: one level  Home Access:  5 step to enter without rails   Bathroom Layout: walker accessible, wheelchair accessible, tub/shower only  Bathroom Equipment: hand held shower head  Toilet Height: standard height- low  Home Equipment: single point cane, manual wheelchair  Transfer Assistance: Independent without use of device  Ambulation Assistance:Independent without use of device  ADL Assistance: independent with all ADL's with exception of assist to don socks- pt is IND w/ shoes pending shoe style   IADL Assistance: requires assistance with most homemaking tasks; pt IND to mow lawn on riding mower; IND with medications and light meal prep (microwave use/snacks)  Active :        [x] Yes                 [] No  Hand Dominance: [] Left                 [x] Right  Current Employment: retired. Occupation: Engineering  Hobbies: Drive, fish, working on the car, yard work  [employer]: once a week in yard, has slipped a few times in shower        Subjective:   General: pt in recliner at entry, agreeable to session. Wife present throughout session   Pain: 0/10  Pain Interventions: not applicable        Activities of Daily Living  Basic Activities of Daily Living  General Comments: declines ADLs this session  Instrumental Activities of Daily Living  No IADL completed on this date. Functional Mobility  Bed Mobility  Bed mobility not completed on this date.   Comments:  Transfers  Sit to stand transfer:contact guard assistance  Stand to sit transfer: contact guard assistance, minimal assistance  Stand pivot transfer: contact guard assistance  Comments: pt completes transfers from w/c x2, anny table x1, and chair x2 with CGA to FWW with R walker splint, requires VC for sequence and R UE placing and removing in splint. pt with fair eccentric control overall, on first stand to sit transfer into w/c pt prematurely sat before aligning hips and fully turning walker- pt demo improvements with further transfers with min cues. Completes SPT to/from w/c<>anny table with CGA and FWW    Functional Mobility:  Sitting Balance: stand by assistance, seated on anny table ~25minutes. Functional Mobility: .  minimal assistance  Functional Mobility Activity: ~12+12ft w/c<>recliner  Functional Mobility Device Use: rolling walker  Functional Mobility Comment: use of RW  splint for mobility, at EOS, R LE hyperextension noted on second walk and requires standing rest break. First session:  Seated on mat table, pt completes multiple TA w/ focus on WB through R UE and RUE AROM/coordination to facilitate functional use of R UE: reaching with LUE across body and forward flexion over R UE x15 each way- support provided at hand and elbow to facilitate WB. Rest breaks taken between each set. Pt completes diagonal reaching with BUE on therapy ball completes x20 each direction- requires A to maintain R grasp on ball. With tray table in front, pt stacks x15 cones with R UE- pt able to grasp cones with increased time and able to complete 2-3 grasp,place, release with light support at elbow; however, with fatigue requires A to coordinate UE movement to place and to release further cones. Seated in w/c pt completes shoulder ladder up/down 6 rungs x10 with A to maintain R grasp on blue therapy  bar- pt takes frequent rest breaks between sets. Pt left in recliner chair at EOS with all needs met, chair alarm in place and call light within reach    Second Session:  Pt seated EOB with RN present, agreeable to OT treatment session. Pt completes UB sponge bathing seated EOB with Min A for thoroughness, verbal cues to use R UE with HOHA with L UE.  Pt with fair recall of UB dressing techniques, able to doff shirt with SBA, don shirt with Min A to manage RUE - able to use RUE to assist with managing shirt over trunk. Seated EOB, pt completes AAROM with LUE shoulder flexion, elbow flex, sup/pro, wrist flex/ext, composite fist and digit extension x10 reps each. Pt completes STS from EOB to FWW with R splint and CGA. Ambulates ~15ft with Min A- no R LE hyperextension of knee buckling noted, slight LOB with turning to L however pt able to recover. Pt returns to supine with SBA and increased effort to manage BLE. Pt left in bed with bed alarm on, call light in reach, and all needs met. Cognition  Overall Cognitive Status: WFL  Arousal/Alterness: appropriate responses to stimuli  Following Commands: follows all commands without difficulty  Attention Span: attends with cues to redirect  Safety Judgement: decreased awareness of need for assistance, decreased awareness of need for safety  Problem Solving: assistance required to implement solutions  Insights: fully aware of deficits  Initiation: requires cues for some  Sequencing: requires cues for some  Comment: cog appears overall WFL based on functional tasks completed during eval; dysarthric speech  Orientation:    alert and oriented x 4  Command Following:   [facility]     Education  Barriers To Learning: none  Patient Education: patient educated on goals, OT role and benefits, plan of care, ADL adaptive strategies, proper use of assistive device/equipment, adaptive device training, transfer training, discharge recommendations  Learning Assessment:  patient verbalizes understanding, would benefit from continued reinforcement    Assessment  Activity Tolerance: tolerated well but increased fatigue noted at EOS and requires rest breaks through activities.   Impairments Requiring Therapeutic Intervention: decreased functional mobility, decreased ADL status, decreased ROM, decreased strength, decreased safety awareness, decreased endurance, decreased balance, decreased IADL, decreased fine motor control, decreased coordination  Prognosis: good  Clinical Assessment: Pt is a 60 yr old male who presents post CVA w/ primarily R side and speech/language deficits. Pt is below baseline level of function d/t above deficits. Pt is progressing slowly, requiring ongoing training and education on meli-dressing and AE use. Tranfers and mobility assist levels are improving w/ pt beginning to manage R hand splint on RW. R hand demo's slightly increased flex/ext of digits and some thumb flex/ext noted as well. Pt is motivated and participates well within sessions. Pt continues to benefit from intensive inpt therapy to maximize functional independence and safety. Continue with POC.   Safety Interventions: patient left in chair, chair alarm in place, call light within reach, gait belt, and patient at risk for falls    Plan  Frequency: 5 x/week, 60 min/day  Current Treatment Recommendations: strengthening, ROM, balance training, functional mobility training, transfer training, endurance training, neuromuscular re-education, ADL/self-care training, IADL training, home management training, safety education, equipment evaluation/education, and positioning    Goals  Patient Goals: \"get back to driving\", regain IND with mobility    Short Term Goals:  Time Frame: 10-14 days  Patient will complete upper body ADL at supervision   Patient will complete lower body ADL at minimal assistance   Patient will complete toileting at minimal assistance   Patient will complete self-feeding at modified independent   Patient will complete grooming at modified independent   Patient will complete functional transfers at supervision     Long Term Goals:  Time Frame: 2-3 weeks   Patient will complete upper body ADL at modified independent   Patient will complete lower body ADL at modified independent   Patient will complete toileting at modified independent   Patient will complete functional transfers at modified independent   Patient will complete functional mobility at modified independent   Patient to gather and transport IADL items at supervision      -pt progressing towards goals  10/21, 10/22    Therapy Session Time     Individual Group Co-treatment   Time In 845      Time Out 0930      Minutes 45     Timed Code Treatment Minutes:  39    Second Session Therapy Time:   Individual Concurrent Group Co-treatment   Time In 1330         Time Out 1400         Minutes 30         Timed Code Treatment Minutes: 45 + 30  Total Treatment Minutes:   75         Electronically Signed By: Min MACDONALD OTR/L [id number]

## 2022-10-23 LAB
GLUCOSE BLD-MCNC: 152 MG/DL (ref 70–99)
GLUCOSE BLD-MCNC: 155 MG/DL (ref 70–99)
GLUCOSE BLD-MCNC: 159 MG/DL (ref 70–99)
GLUCOSE BLD-MCNC: 165 MG/DL (ref 70–99)
PERFORMED ON: ABNORMAL

## 2022-10-23 PROCEDURE — 6370000000 HC RX 637 (ALT 250 FOR IP): Performed by: PHYSICAL MEDICINE & REHABILITATION

## 2022-10-23 PROCEDURE — 1280000000 HC REHAB R&B

## 2022-10-23 RX ADMIN — LISINOPRIL 5 MG: 5 TABLET ORAL at 08:57

## 2022-10-23 RX ADMIN — TRAZODONE HYDROCHLORIDE 50 MG: 50 TABLET ORAL at 20:56

## 2022-10-23 RX ADMIN — POTASSIUM CHLORIDE 20 MEQ: 1500 TABLET, EXTENDED RELEASE ORAL at 08:57

## 2022-10-23 RX ADMIN — HYDROCHLOROTHIAZIDE 12.5 MG: 25 TABLET ORAL at 08:56

## 2022-10-23 RX ADMIN — INSULIN LISPRO 10 UNITS: 100 INJECTION, SOLUTION INTRAVENOUS; SUBCUTANEOUS at 11:53

## 2022-10-23 RX ADMIN — APIXABAN 5 MG: 5 TABLET, FILM COATED ORAL at 08:56

## 2022-10-23 RX ADMIN — CAPSAICIN: 0.25 CREAM TOPICAL at 20:59

## 2022-10-23 RX ADMIN — CYANOCOBALAMIN TAB 1000 MCG 500 MCG: 1000 TAB at 08:55

## 2022-10-23 RX ADMIN — ESCITALOPRAM OXALATE 5 MG: 10 TABLET ORAL at 08:57

## 2022-10-23 RX ADMIN — FUROSEMIDE 20 MG: 20 TABLET ORAL at 08:55

## 2022-10-23 RX ADMIN — CAPSAICIN: 0.25 CREAM TOPICAL at 08:58

## 2022-10-23 RX ADMIN — ATORVASTATIN CALCIUM 40 MG: 40 TABLET, FILM COATED ORAL at 20:56

## 2022-10-23 RX ADMIN — ATENOLOL 25 MG: 50 TABLET ORAL at 08:56

## 2022-10-23 RX ADMIN — APIXABAN 5 MG: 5 TABLET, FILM COATED ORAL at 20:56

## 2022-10-23 RX ADMIN — INSULIN LISPRO 10 UNITS: 100 INJECTION, SOLUTION INTRAVENOUS; SUBCUTANEOUS at 17:04

## 2022-10-23 RX ADMIN — INSULIN GLARGINE 40 UNITS: 100 INJECTION, SOLUTION SUBCUTANEOUS at 20:55

## 2022-10-23 RX ADMIN — INSULIN LISPRO 10 UNITS: 100 INJECTION, SOLUTION INTRAVENOUS; SUBCUTANEOUS at 08:57

## 2022-10-23 ASSESSMENT — PAIN SCALES - GENERAL
PAINLEVEL_OUTOF10: 0

## 2022-10-24 LAB
ANION GAP SERPL CALCULATED.3IONS-SCNC: 11 MMOL/L (ref 3–16)
BUN BLDV-MCNC: 24 MG/DL (ref 7–20)
CALCIUM SERPL-MCNC: 9 MG/DL (ref 8.3–10.6)
CHLORIDE BLD-SCNC: 108 MMOL/L (ref 99–110)
CO2: 22 MMOL/L (ref 21–32)
CREAT SERPL-MCNC: 1 MG/DL (ref 0.8–1.3)
GFR SERPL CREATININE-BSD FRML MDRD: >60 ML/MIN/{1.73_M2}
GLUCOSE BLD-MCNC: 124 MG/DL (ref 70–99)
GLUCOSE BLD-MCNC: 136 MG/DL (ref 70–99)
GLUCOSE BLD-MCNC: 139 MG/DL (ref 70–99)
GLUCOSE BLD-MCNC: 191 MG/DL (ref 70–99)
GLUCOSE BLD-MCNC: 99 MG/DL (ref 70–99)
HCT VFR BLD CALC: 35.9 % (ref 40.5–52.5)
HEMOGLOBIN: 11.7 G/DL (ref 13.5–17.5)
MCH RBC QN AUTO: 25.9 PG (ref 26–34)
MCHC RBC AUTO-ENTMCNC: 32.5 G/DL (ref 31–36)
MCV RBC AUTO: 79.7 FL (ref 80–100)
PDW BLD-RTO: 14.2 % (ref 12.4–15.4)
PERFORMED ON: ABNORMAL
PERFORMED ON: NORMAL
PLATELET # BLD: 116 K/UL (ref 135–450)
PMV BLD AUTO: 8.6 FL (ref 5–10.5)
POTASSIUM SERPL-SCNC: 4 MMOL/L (ref 3.5–5.1)
RBC # BLD: 4.51 M/UL (ref 4.2–5.9)
SODIUM BLD-SCNC: 141 MMOL/L (ref 136–145)
WBC # BLD: 7.1 K/UL (ref 4–11)

## 2022-10-24 PROCEDURE — 85027 COMPLETE CBC AUTOMATED: CPT

## 2022-10-24 PROCEDURE — 97535 SELF CARE MNGMENT TRAINING: CPT

## 2022-10-24 PROCEDURE — 97530 THERAPEUTIC ACTIVITIES: CPT

## 2022-10-24 PROCEDURE — 6370000000 HC RX 637 (ALT 250 FOR IP): Performed by: PHYSICAL MEDICINE & REHABILITATION

## 2022-10-24 PROCEDURE — 92507 TX SP LANG VOICE COMM INDIV: CPT

## 2022-10-24 PROCEDURE — 97116 GAIT TRAINING THERAPY: CPT

## 2022-10-24 PROCEDURE — 97129 THER IVNTJ 1ST 15 MIN: CPT

## 2022-10-24 PROCEDURE — 92526 ORAL FUNCTION THERAPY: CPT

## 2022-10-24 PROCEDURE — 97112 NEUROMUSCULAR REEDUCATION: CPT

## 2022-10-24 PROCEDURE — 97130 THER IVNTJ EA ADDL 15 MIN: CPT

## 2022-10-24 PROCEDURE — 1280000000 HC REHAB R&B

## 2022-10-24 PROCEDURE — 80048 BASIC METABOLIC PNL TOTAL CA: CPT

## 2022-10-24 PROCEDURE — 97110 THERAPEUTIC EXERCISES: CPT

## 2022-10-24 RX ADMIN — ATORVASTATIN CALCIUM 40 MG: 40 TABLET, FILM COATED ORAL at 20:01

## 2022-10-24 RX ADMIN — HYDROCHLOROTHIAZIDE 12.5 MG: 25 TABLET ORAL at 09:22

## 2022-10-24 RX ADMIN — ATENOLOL 25 MG: 50 TABLET ORAL at 09:21

## 2022-10-24 RX ADMIN — APIXABAN 5 MG: 5 TABLET, FILM COATED ORAL at 09:23

## 2022-10-24 RX ADMIN — APIXABAN 5 MG: 5 TABLET, FILM COATED ORAL at 20:01

## 2022-10-24 RX ADMIN — INSULIN LISPRO 10 UNITS: 100 INJECTION, SOLUTION INTRAVENOUS; SUBCUTANEOUS at 16:37

## 2022-10-24 RX ADMIN — INSULIN LISPRO 10 UNITS: 100 INJECTION, SOLUTION INTRAVENOUS; SUBCUTANEOUS at 09:27

## 2022-10-24 RX ADMIN — CAPSAICIN: 0.25 CREAM TOPICAL at 20:01

## 2022-10-24 RX ADMIN — POTASSIUM CHLORIDE 20 MEQ: 1500 TABLET, EXTENDED RELEASE ORAL at 09:22

## 2022-10-24 RX ADMIN — CAPSAICIN: 0.25 CREAM TOPICAL at 09:31

## 2022-10-24 RX ADMIN — LISINOPRIL 5 MG: 5 TABLET ORAL at 09:22

## 2022-10-24 RX ADMIN — INSULIN LISPRO 10 UNITS: 100 INJECTION, SOLUTION INTRAVENOUS; SUBCUTANEOUS at 12:13

## 2022-10-24 RX ADMIN — ESCITALOPRAM OXALATE 5 MG: 10 TABLET ORAL at 09:22

## 2022-10-24 ASSESSMENT — PAIN SCALES - GENERAL
PAINLEVEL_OUTOF10: 0

## 2022-10-24 NOTE — PLAN OF CARE
Problem: Discharge Planning  Goal: Discharge to home or other facility with appropriate resources  Outcome: Progressing  Flowsheets (Taken 10/24/2022 0930)  Discharge to home or other facility with appropriate resources: Identify barriers to discharge with patient and caregiver     Problem: Skin/Tissue Integrity  Goal: Absence of new skin breakdown  Description: 1. Monitor for areas of redness and/or skin breakdown  2. Assess vascular access sites hourly  3. Every 4-6 hours minimum:  Change oxygen saturation probe site  4. Every 4-6 hours:  If on nasal continuous positive airway pressure, respiratory therapy assess nares and determine need for appliance change or resting period.   Outcome: Progressing     Problem: Safety - Adult  Goal: Free from fall injury  Outcome: Progressing     Problem: ABCDS Injury Assessment  Goal: Absence of physical injury  Outcome: Progressing     Problem: Pain  Goal: Verbalizes/displays adequate comfort level or baseline comfort level  Outcome: Progressing  Flowsheets (Taken 10/24/2022 0930)  Verbalizes/displays adequate comfort level or baseline comfort level: Encourage patient to monitor pain and request assistance     Problem: Chronic Conditions and Co-morbidities  Goal: Patient's chronic conditions and co-morbidity symptoms are monitored and maintained or improved  Outcome: Progressing  Flowsheets (Taken 10/24/2022 0930)  Care Plan - Patient's Chronic Conditions and Co-Morbidity Symptoms are Monitored and Maintained or Improved: Monitor and assess patient's chronic conditions and comorbid symptoms for stability, deterioration, or improvement

## 2022-10-24 NOTE — PROGRESS NOTES
[facility] Department   Phone: [phone]    Physical Therapy    [] Initial Evaluation            [x] Daily Treatment Note         [] Discharge Summary      Patient: Shaji Carson   : 1960   MRN: [id number]   Date of Service:  10/24/2022  Admitting Diagnosis: Acute embolic stroke [facility])  Current Admission Summary: 50-year-old male with a history of diabetes, MI, HTN, HLD, diabetic neuropathy, and nonrheumatic aortic valve stenosis who was admitted to  on 10/11 for a scheduled TAVR procedure. The procedure was complicated by an episode of V. fib and postoperatively he was noted to have neurologic deficits and work-up revealed a thromboembolic stroke involving the left cerebral territories. Work-up including TTE was unremarkable for PFO. He was initiated on Eliquis for prophylaxis. Patient requires significant insulin at baseline with his normal home regimen of 80 units of Lantus with 20 units Humalog 3 times daily. A1c was 8.6. Due to decreased p.o. intake his regimen was adjusted. He was evaluated by therapy and suggested to continue in an inpatient setting prior to returning home. He reports he is frustrated by his situation but motivated to recover. Past Medical History:  has a past medical history of Carbuncle, DM2 (diabetes mellitus, type 2) ([address] ED (erectile dysfunction), History of heart attack, HTN (hypertension), Hyperlipidemia, Peripheral neuropathy, Sleep apnea, and Wears glasses. Past Surgical History:  has a past surgical history that includes Cardiac catheterization (); Pilonidal cyst excision; cyst removal; cardiovascular stress test (2012); cardiovascular stress test (2019); back surgery; Colonoscopy (2021); and Colonoscopy (N/A, 2021).   Discharge Recommendations: home with home health PT  DME Required For Discharge: DME to be determined pending patient progress  Precautions/Restrictions: high fall risk  Weight Bearing Restrictions: weight bearing as tolerated  [] Right Upper Extremity  [] Left Upper Extremity [] Right Lower Extremity  [] Left Lower Extremity     Required Braces/Orthotics: no braces required   [] Right  [] Left  Positional Restrictions:no positional restrictions    Pre-Admission Information   Lives With: spouse    Type of Home: house  Home Layout: one level  Home Access:  5 step to enter without rails   Bathroom Layout: walker accessible, wheelchair accessible, tub only  Bathroom Equipment: hand held shower head, will use shower chair  Toilet Height: standard height- low  Home Equipment: single point cane, manual wheelchair  Transfer Assistance: Independent without use of device  Ambulation Assistance:Independent without use of device  ADL Assistance: independent with all ADL's, help with putting on socks  IADL Assistance: requires assistance with all homemaking tasks, would mow at home, handles his own medications  Active :        [x] Yes  [] No  Hand Dominance: [] Left  [x] Right  Current Employment: retired. Occupation: Engineering  Hobbies: Drive, fish, working on the car, yard work  [employer]: once a week in yard, has slipped a few times in shower        Subjective  General: Pt supine in flat bed on arrival.  Pants half down. States he had trouble getting them up, so he left them that way. Pain: 0/10  Pain Interventions: not applicable       Functional Mobility  Bed Mobility  Supine to Sit: stand by assistance  Rolling Right: stand by assistance  Scooting: stand by assistance  Comments: Required heavy use of handrail, increased time, and required multiple trials to sit himself up and adjust his positioning. Also required verbal cues for sequencing.   Transfers  Sit to stand transfer: contact guard assistance, minimal assistance, moderate assistance  Stand to sit transfer: contact guard assistance, minimal assistance  Stand pivot transfer: contact guard assistance, minimal assistance  Comments: Required modA on first sit to stand transfer due to legs buckling. Required varying levels of assistance between CGA and Amarilis for all other transfers. Ambulation  Surface:level surface  Assistive Device: rolling walker  Other Appliance: (R), walker splint attachment  Assistance: contact guard assistance, minimal assistance, moderate assistance - varied  Distance:   Gait Mechanics: Inconsistent skip speed causing fall risk, decreased R step length and height, R external rotation, decreased R knee bend and heel strike. Required verbal/tactile cues to increase R step length and height and prevent hyperextension, with fatigue pt demonstrates decreased R pelvis anterior rotation to assist with R swing through and pt ends up with LLE IR and RLE ER and RW to the L. Comments: A RW hand splint was utilized to assist R hand with gripping walker. As pt became fatigued, his skip became inconsistent, reduced step length on the R, and he had difficulty pushing the walker straight. Pt required quick standing breaks to attempt to regroup himself. Stair Mobility  Stair mobility not completed on this date. Comments:   Wheelchair Mobility:  Chair: manual  Surface: level surface  Method: (L) UE, (R) LE, and (L) LE  Distance: 40 ft + 60' ft   Comments: Unable to use R UE for propulsion. Required heavy use of L LE with inability to use R LE. Demonstrated improvement with R LE propulsion in the 2nd session.    Balance  Static Sitting Balance: fair (-): maintains balance at CGA with use of UE support  Dynamic Sitting Balance: fair (-): maintains balance at CGA with use of UE support  Static Standing Balance: fair (-): maintains balance at CGA with use of UE support  Dynamic Standing Balance: fair (-): maintains balance at CGA with use of UE support  Comments:     Other Therapeutic Interventions  First Session:   Pt supine in bed awake on arrival. Pt reports taking new sleep meds first time last night and that he still feels tired this morning. Pt performed bed mobility as noted above. Pt performed sit to stand transfer with modA, as his legs buckled once he began to stand up. Pt was taken to the gym and performed stand pivot to bike with Amarilis. Pt performed 8 min on bike with Ace wrap around his R hand for stabilization. Pt performed weight shifts X 20 with CGA, with pt demonstrating heavy use of R UE assist on parallel bar, inconsistent R knee extension, and decreased eccentric control when shifting to the R. Pt performed R foot 4-way stability ball exercise, which required facilitation to help control his foot. Pt also performed X 10 B LAQ, with decreased ROM and eccentric control on the R LE. Pt performed wc mobility as noted above. Pt was returned to room and left in wc with all needs within reach. Second Session: Pt in chair on arrival. Performed stand pivot with CGA to wc. Pt performed ambulation as noted above. Pt performed janna step overs in parallel bars X 10 with CGA, and pt required verbal cues to shift weight forward as he was demonstrating a posterior lean. Pt also performed step-to activity X 3 with CGA with emphasis on shifting weight forward and obtaining full R knee extension with each step. Pt demonstrated improvement with wheelchair mobility compared to previous treatment session, as he was able to propel himself forward more with his R LE. Pt was returned to chair with alarm on and all needs within reach.    Functional Outcomes                 Cognition  WFL  Orientation:    alert and oriented x 4  Command Following:   [facility]    Education  Barriers To Learning: none  Patient Education: patient educated on goals, PT role and benefits, general safety, functional mobility training, proper use of assistive device/equipment, transfer training  Learning Assessment:  patient verbalizes and demonstrates understanding    Assessment  Activity Tolerance: decreased endurance  Impairments Requiring Therapeutic Intervention: decreased functional mobility, decreased ADL status, decreased ROM, decreased strength, decreased safety awareness, decreased endurance, decreased sensation, decreased balance  Prognosis: good  Clinical Assessment:  Pt continues to present with impaired functional mobility and R sided UE/LE strength and ROM deficits. Pt also continues to be limited by fatigue, as he begins to compensate and the quality of his movement quickly deteriorates. Pt continues to require skilled ARU physical therapy services in order to address deficits and promote independence to allow the patient to safely succeed in his home environment.    Safety Interventions: patient left in chair, chair alarm in place, call light within reach, and patient at risk for falls    Plan  Frequency: 5 x/week, 90 min/day  Current Treatment Recommendations: strengthening, ROM, balance training, functional mobility training, transfer training, gait training, stair training, endurance training, neuromuscular re-education, ADL/self-care training, and home management training    Goals  Patient Goals: Get back to driving, get back to moving around without falling   Short Term Goals:  Time Frame: 2 weeks  Patient will complete bed mobility at modified independent   Patient will complete transfers at [facility]   Patient will ambulate 150 ft with use of LRAD at [facility]  Patient will ascend/descend 8 stairs with (B) handrail at stand by assistance  Patient will complete car transfer at [facility]    Therapy Session Time      Individual Group Co-treatment   Time In  730      Time Out  815      Minutes  45        Timed Code Treatment Minutes:  45    Second Session Therapy Time:   Individual Concurrent Group Co-treatment   Time In  1245         Time Out  1315         Minutes  30           Timed Code Treatment Minutes:  45 + 30    Total Treatment Minutes:   75  Electronically Signed By: Ana Ceballos PT  Francesco Mccarthy SPT  Therapist was present, directed the patient's care, made skilled judgement, and was responsible for assessment and treatment of the patient. Co-signed and supervised by:  Denisse Santiago DPT [id number]

## 2022-10-24 NOTE — PROGRESS NOTES
Germaine Loaiza  10/24/2022  [id number]    Chief Complaint: Acute embolic stroke ([address]    Subjective:   No significant weekend events. No current complaints. Labs reviewed. Slept great last night with trazodone. ROS: No CP, SOB, dyspnea    Objective:  Patient Vitals for the past 24 hrs:   BP Temp Temp src Pulse Resp SpO2   10/24/22 0814 -- 98.3 °F (36.8 °C) Oral 60 17 93 %   10/23/22 2030 124/72 97.7 °F (36.5 °C) Oral 60 18 93 %       Gen: No distress, pleasant. Resting in WC  HEENT: Normocephalic, atraumatic. CV: Regular rate and rhythm. No MRG   Resp: No respiratory distress. CTAB   Abd: Soft, nontender nondistended  Ext: No edema. Neuro: Alert, oriented, appropriately interactive. Laboratory data: Available via EMR. Therapy progress:    PT    Supine to Sit: Partial/moderate assistance  Sit to Supine:     Sit to Stand: Partial/moderate assistance  Chair/Bed to Chair Transfer: Partial/moderate assistance  Car Transfer: Partial/moderate assistance  Ambulation 10 ft: Partial/moderate assistance  Ambulation 50 ft: Partial/moderate assistance  Ambulation 150 ft:    Stairs - 1 Step: Partial/moderate assistance  Stairs - 4 Step: Partial/moderate assistance  Stairs - 12 Step: Partial/moderate assistance    OT    Eating:    Oral Hygiene: Supervision or touching assistance  Bathing: Partial/moderate assistance  Upper Body Dressing: Partial/moderate assistance  Lower Body Dressing: Partial/moderate assistance  Toilet Transfer: Partial/moderate assistance  Toilet Hygiene: Partial/moderate assistance    Speech Therapy    Pt continues to present with Mild  Dysarthria , Cognitive-Linguistic Deficits , and Oropharyngeal Dysphagia . Pt is demonstrating progress with insight into deficits, attention towards tasks, and carry over of new information.   Continue to target right oral motor strength and coordination to facilitate speech precision, safe diet tolerance; and cognitive tasks for safe return to prior level of independence. Body mass index is 39.29 kg/m². Assessment:  Patient Active Problem List   Diagnosis    HTN (hypertension)    DM2 (diabetes mellitus, type 2) ([facility]    High cholesterol    Acute embolic stroke ([facility]       Plan:   Left cerebral hemisphere thromboembolic infarcts: Eliquis, statin. PT/OT/SLP. Started lexapro     Nonrheumatic aortic valve stenosis: S/p TAVR on 10/11. Eliquis     DM: A1c 8.6, Lantus 40 (80), prandial 10 (20), and SSI     DM neuropathy: no current meds     CAD: eliquis, statin     HTN: atenolol 25, lisinopril 5, HCTZ 12.5     HLD: lipitor 40     Hypokalemia: supplement     Bowels: Per protocol  Bladder: Per protocol   Sleep: Trazodone provided prn. Pain: tylenol, tramadol, robaxin PRN   DVT PPx: Eliquis   KAYE: 11/1    Shemar Cristobal MD 10/24/2022, 9:14 AM    * This document was created using dictation software. While all precautions were taken to ensure accuracy, errors may have occurred. Please disregard any typographical errors.

## 2022-10-24 NOTE — PROGRESS NOTES
[facility] Department   Phone: [phone]    Speech Therapy    [] Initial Evaluation            [x] Daily Treatment Note         [] Discharge Summary      Patient: Pierre Hunter   : 1960   MRN: [id number]   Date of Service:  10/24/2022  Admitting Diagnosis: Acute embolic stroke [facility])  Current Admission Summary: 41-year-old male with a history of diabetes, MI, HTN, HLD, diabetic neuropathy, and nonrheumatic aortic valve stenosis who was admitted to  on 10/11 for a scheduled TAVR procedure. The procedure was complicated by an episode of V. fib and postoperatively he was noted to have neurologic deficits and work-up revealed a thromboembolic stroke involving the left cerebral territories. Work-up including TTE was unremarkable for PFO. He was initiated on Eliquis for prophylaxis. Patient requires significant insulin at baseline with his normal home regimen of 80 units of Lantus with 20 units Humalog 3 times daily. A1c was 8.6. Due to decreased p.o. intake his regimen was adjusted. He was evaluated by therapy and suggested to continue in an inpatient setting prior to returning home. He reports he is frustrated by his situation but motivated to recover. Past Medical History:  has a past medical history of Carbuncle, DM2 (diabetes mellitus, type 2) ([address] ED (erectile dysfunction), History of heart attack, HTN (hypertension), Hyperlipidemia, Peripheral neuropathy, Sleep apnea, and Wears glasses. Past Surgical History:  has a past surgical history that includes Cardiac catheterization (); Pilonidal cyst excision; cyst removal; cardiovascular stress test (2012); cardiovascular stress test (2019); back surgery; Colonoscopy (2021); and Colonoscopy (N/A, 2021).   Precautions/Restrictions: high fall risk, up as tolerated  Instrumental Swallow Study: FEES at outside hospital completed on 10/12/2022      Pre-Admission Information   Living Status: Pt lives with his significant other Luis F Hearn), a dog and cat. 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 Reported feeling more tired today despite sleeping really well last night. Pain: Denied pain   Safety Interventions: patient left in chair and chair alarm in place       Therapeutic Interventions:     Session 1:   Dysphagia: Severity: Mild  and Moderate   Patient tolerated NMES via VitalStim placement 4a (targeting the orpharyngeal \"sling\", intrinsic/extrinsic muscles of the tongue and pharyngeal constrictor) @ 16. 5mA for 25 minutes. Utilized in combination with oropharyngeal strengthening and motor speech exercise. Required shaving assistance, theraband and water droplet for adequate cohesion.      Oropharyngeal resistance   - Pudding via straw x3; propelled entire length of straw with ease   -SLP applied resistance with frozen stimulation x25; adequate oral control and labial seal for bolus containment and propulsion     Motor Speech: Severity: Mild  and Moderate   Oral Motor Exercises (OMEs)   - Labial Retraction/ Protrusion x10   - Alternating movements a/e/o x10     Speech Sample (oral reading)   - mild to moderately reduced vocal intensity; improved given verbal cues for loudness   - verbalized awareness of reduced endurance as passage progressed   - mild articulatory precision errors     Cognition: Severity: Mild   Working memory (person [employer]" information)   - 93% accuracy   - improved accuracy and retention with repetitions of task (awareness to be attentive to details)     Functional problem solving (goal setting and discharge plans)   - pt ARU.  Therapeutic Interventions: Diet Tolerance Monitoring  Speech / Motor Planning / Voice intervention  and Cognitive-Linguistic intervention   Discharge Recommendations: Home independently  Continued SLP at Discharge: TBD based upon progress     Goals  Patient Goals: \"improve my speech\"   Time Frame: 2 weeks     Pt will tolerate recommended diet and advanced trials with no overt s/s of aspiration. Ongoing  Pt will improve oral motor strength and ROM for coordinated and alternating motions, via graded tasks to improve speech back to baseline level as subjectively rated by SLP/pt and family. Ongoing   Pt will improve executive function for multifactor task completion via graded tasks to 80% accuracy. Ongoing   Pt will improve attention to detail for graded complex information to 80%, for improved recall and retention of newly learned information. Ongoing       Therapy Session Time      Session 1 Session 2   Time In 0930    Time Out 1030    Time Code Minutes 25    Individual Minutes 60      Timed Code Treatment Minutes: 60  Total Treatment Minutes:  60    Electronically Signed By:  Nguyen Lanza M.A.  [facility]-SLP GIL [id number]  Speech-Language Pathologist   10/24/2022 2:03 PM

## 2022-10-24 NOTE — PROGRESS NOTES
[facility] Department   Phone: [phone]    Occupational Therapy    [] Initial Evaluation            [x] Daily Treatment Note         [] Discharge Summary      Patient: Aleshia Springer   : 1960   MRN: [id number]   Date of Service:  10/24/2022    Admitting Diagnosis:  Acute embolic stroke [facility])  Current Admission Summary: 54-year-old male with a history of diabetes, MI, HTN, HLD, diabetic neuropathy, and nonrheumatic aortic valve stenosis who was admitted to  on 10/11 for a scheduled TAVR procedure. The procedure was complicated by an episode of V. fib and postoperatively he was noted to have neurologic deficits and work-up revealed a thromboembolic stroke involving the left cerebral territories. Work-up including TTE was unremarkable for PFO. He was initiated on Eliquis for prophylaxis. Patient requires significant insulin at baseline with his normal home regimen of 80 units of Lantus with 20 units Humalog 3 times daily. A1c was 8.6. Due to decreased p.o. intake his regimen was adjusted. He was evaluated by therapy and suggested to continue in an inpatient setting prior to returning home  Past Medical History:  has a past medical history of Carbuncle, DM2 (diabetes mellitus, type 2) ([address] ED (erectile dysfunction), History of heart attack, HTN (hypertension), Hyperlipidemia, Peripheral neuropathy, Sleep apnea, and Wears glasses. Past Surgical History:  has a past surgical history that includes Cardiac catheterization (); Pilonidal cyst excision; cyst removal; cardiovascular stress test (2012); cardiovascular stress test (2019); back surgery; Colonoscopy (2021); and Colonoscopy (N/A, 2021).     Discharge Recommendations: [facility] OT S3    DME Required For Discharge: DME to be determined pending patient progress; likely to require shower chair    Precautions/Restrictions: high fall risk, up as tolerated  Weight Bearing Restrictions: no restrictions    Required Braces/Orthotics: no braces required  Positional Restrictions:no positional restrictions    Pre-Admission Information   Lives With: spouse                  Type of Home: house  Home Layout: one level  Home Access:  5 step to enter without rails   Bathroom Layout: walker accessible, wheelchair accessible, tub/shower only  Bathroom Equipment: hand held shower head  Toilet Height: standard height- low  Home Equipment: single point cane, manual wheelchair  Transfer Assistance: Independent without use of device  Ambulation Assistance:Independent without use of device  ADL Assistance: independent with all ADL's with exception of assist to don socks- pt is IND w/ shoes pending shoe style   IADL Assistance: requires assistance with most homemaking tasks; pt IND to mow lawn on riding mower; IND with medications and light meal prep (microwave use/snacks)  Active :        [x] Yes                 [] No  Hand Dominance: [] Left                 [x] Right  Current Employment: retired. Occupation: Engineering  Hobbies: Drive, fish, working on the car, yard work  [employer]: once a week in yard, has slipped a few times in shower        Subjective:   General: pt in  w/c at entry, pleasant and agreeable to session, reporting LE fatigue   Pain: 0/10  Pain Interventions: not applicable        Activities of Daily Living  Basic Activities of Daily Living  Grooming: setup assistance supervision  Grooming Comments: oral care w/c level at sink  Toileting: moderate assistance. Toileting Comments: assist for stance balance (CGA to Baron) + clothing management to don over hips- pt voided urine in stance at commode   Instrumental Activities of Daily Living  No IADL completed on this date. Functional Mobility  Bed Mobility  Bed mobility not completed on this date.   Comments:  Transfers  Sit to stand transfer:contact guard assistance  Stand to sit transfer: minimal assistance  Stand pivot transfer: contact guard assistance  Comments: SPTs chair>w/c and w/c>recliner; pt demo'd good hand placement, able to place R hand in splint but assist to use strap    Functional Mobility:  Sitting Balance: stand by assistance. Standing Balance: contact guard assistance, minimal assistance. Functional Mobility: .  minimal assistance  Functional Mobility Activity: w/c>commode>chair at sink   Functional Mobility Device Use: rolling walker  Functional Mobility Comment: use of RW  splint for mobility, intermittent Min for RW and/or slight balance d/t R LE weakness/fatigue     Additional Activity:  Table top activities completed with focus on functional grasp, coordination and strength of R UE:  1) pt removed and replaced 5 lids from various sized containers- R hand used as retriever and stabilizer while L hand removed lids- intermittent HOHA w/ larger items and assist to extend digits  2) pt removed 7 large pegs from board w/ R hand- pegs then transferred to basket placed to R side- intermittent assist for digit extension to release pegs, compensatory movements of trunk blocked   3) ROM arch w/ 5 rings- cross body to/from each direction- Baron at elbow/shoulder to increase ROM, increased time and VC for tight pincer and lateral grasps throughout  4) pt completed AROM 10 reps x1: scap elevation, scap retraction, int/ext elbow rotations, arm glides/extension to target, wrist flex, palmar grasp    Rest breaks taken as needed throughout- pt continues to demo limited extensor activation of hand and wrist, increased tricep noted. Second Session:  Pt in recliner at entry, agreeable to session, denied pain and declined ADL needs. Pt completed sit>stands at CGA and stand>sit at CGA to Baron (pt sitting to R frequently, cues to control descent), SPTs recliner<>w/c and w/c<>EOM at [facility].      Multiple dynamic EOM activities completed with emphasis on WBing facilitation and AROM to increase coordination/functional use of R UE: elbow props to R x8, elbow props to R w/ L overhead reaching to grasp/stack cones 8 reps; elbow props to R w/ L cross body reaching to grasp/stack cones x8; modified sit-ups holding ball (B) to tap target held across multiple planes 10 reps x2 (intermittent VC for R hand placement on ball)    Pt completed wall push-ups (standard and narrow) in stance to wall for increased WB and strengthening of UEs: 10 reps x1 of each, HOHA to R hand for positioning, TC/input to elbow provided throughout    In stance to mirror pt completed vertical arm glides w/ R hand on cloth to increase coordination and AROM/strength: ~10reps, VC and visual cue for direction, assist for digits extension and TC to elbow to increase range. Pt tolerated session well, continues to report LE weakness w/ slight buckling of  R knee noted during transfers and static stances- pt left in recliner in room at EOS, chair alarm on, call light and needs in reach.              Cognition  Overall Cognitive Status: WFL  Arousal/Alterness: appropriate responses to stimuli  Following Commands: follows all commands without difficulty  Attention Span: attends with cues to redirect  Safety Judgement: decreased awareness of need for assistance, decreased awareness of need for safety  Problem Solving: assistance required to implement solutions  Insights: fully aware of deficits  Initiation: requires cues for some  Sequencing: requires cues for some  Comment: cog appears overall WFL based on functional tasks completed during eval; dysarthric speech  Orientation:    alert and oriented x 4  Command Following:   [facility]     Education  Barriers To Learning: none  Patient Education: patient educated on goals, OT role and benefits, plan of care, ADL adaptive strategies, proper use of assistive device/equipment, adaptive device training, transfer training, discharge recommendations, AROM of involved R UE  Learning Assessment:  patient verbalizes understanding, would benefit from continued reinforcement    Assessment  Activity Tolerance: tolerated well, requires rest breaks through activities. Impairments Requiring Therapeutic Intervention: decreased functional mobility, decreased ADL status, decreased ROM, decreased strength, decreased safety awareness, decreased endurance, decreased balance, decreased IADL, decreased fine motor control, decreased coordination  Prognosis: good  Clinical Assessment: Pt is a 60 yr old male who presents post CVA w/ primarily R side and speech/language deficits. Pt is below baseline level of function d/t above deficits. Pt is progressing slowly, Min to CGA for all tranfers and mobility, requiring ongoing education on RW R hand splint management. R UE continues to present w/ limited extensors of hand/wrist. Pt participates well within sessions, continues to demo motivated for therapy. Pt continues to benefit from intensive inpt therapy to maximize functional independence and safety. Continue with POC.   Safety Interventions: patient left in chair, chair alarm in place, call light within reach, gait belt, and patient at risk for falls    Plan  Frequency: 5 x/week, 60 min/day  Current Treatment Recommendations: strengthening, ROM, balance training, functional mobility training, transfer training, endurance training, neuromuscular re-education, ADL/self-care training, IADL training, home management training, safety education, equipment evaluation/education, and positioning    Goals  Patient Goals: \"get back to driving\", regain IND with mobility    Short Term Goals:  Time Frame: 10-14 days  Patient will complete upper body ADL at supervision   Patient will complete lower body ADL at minimal assistance   Patient will complete toileting at minimal assistance   Patient will complete self-feeding at modified independent   Patient will complete grooming at modified independent   Patient will complete functional transfers at supervision     Long Term Goals:  Time Frame: 2-3 weeks Patient will complete upper body ADL at modified independent   Patient will complete lower body ADL at modified independent   Patient will complete toileting at modified independent   Patient will complete functional transfers at modified independent   Patient will complete functional mobility at modified independent   Patient to gather and transport IADL items at supervision      -pt progressing towards goals  10/24    Therapy Session Time     Individual Group Co-treatment   Time In 0830      Time Out 0915      Minutes 45     Timed Code Treatment Minutes:  [address] Time:   Individual Concurrent Group Co-treatment   Time In 1115         Time Out 1145         Minutes 30         Timed Code Treatment Minutes: 45 + 30  Total Treatment Minutes:   75         Electronically Signed By: Alicia Faust OTR/L #[id number]

## 2022-10-25 LAB
GLUCOSE BLD-MCNC: 121 MG/DL (ref 70–99)
GLUCOSE BLD-MCNC: 149 MG/DL (ref 70–99)
GLUCOSE BLD-MCNC: 151 MG/DL (ref 70–99)
GLUCOSE BLD-MCNC: 256 MG/DL (ref 70–99)
PERFORMED ON: ABNORMAL

## 2022-10-25 PROCEDURE — 97129 THER IVNTJ 1ST 15 MIN: CPT

## 2022-10-25 PROCEDURE — 97130 THER IVNTJ EA ADDL 15 MIN: CPT

## 2022-10-25 PROCEDURE — 97530 THERAPEUTIC ACTIVITIES: CPT

## 2022-10-25 PROCEDURE — 1280000000 HC REHAB R&B

## 2022-10-25 PROCEDURE — 97112 NEUROMUSCULAR REEDUCATION: CPT

## 2022-10-25 PROCEDURE — 92507 TX SP LANG VOICE COMM INDIV: CPT

## 2022-10-25 PROCEDURE — 6370000000 HC RX 637 (ALT 250 FOR IP): Performed by: PHYSICAL MEDICINE & REHABILITATION

## 2022-10-25 PROCEDURE — 92526 ORAL FUNCTION THERAPY: CPT

## 2022-10-25 PROCEDURE — 97116 GAIT TRAINING THERAPY: CPT

## 2022-10-25 PROCEDURE — 97535 SELF CARE MNGMENT TRAINING: CPT

## 2022-10-25 RX ADMIN — ATENOLOL 25 MG: 50 TABLET ORAL at 08:11

## 2022-10-25 RX ADMIN — CAPSAICIN: 0.25 CREAM TOPICAL at 08:15

## 2022-10-25 RX ADMIN — INSULIN LISPRO 10 UNITS: 100 INJECTION, SOLUTION INTRAVENOUS; SUBCUTANEOUS at 08:16

## 2022-10-25 RX ADMIN — INSULIN LISPRO 4 UNITS: 100 INJECTION, SOLUTION INTRAVENOUS; SUBCUTANEOUS at 12:30

## 2022-10-25 RX ADMIN — POTASSIUM CHLORIDE 20 MEQ: 1500 TABLET, EXTENDED RELEASE ORAL at 08:11

## 2022-10-25 RX ADMIN — ESCITALOPRAM OXALATE 5 MG: 10 TABLET ORAL at 08:12

## 2022-10-25 RX ADMIN — APIXABAN 5 MG: 5 TABLET, FILM COATED ORAL at 19:59

## 2022-10-25 RX ADMIN — CAPSAICIN: 0.25 CREAM TOPICAL at 20:42

## 2022-10-25 RX ADMIN — INSULIN GLARGINE 20 UNITS: 100 INJECTION, SOLUTION SUBCUTANEOUS at 19:54

## 2022-10-25 RX ADMIN — HYDROCHLOROTHIAZIDE 12.5 MG: 25 TABLET ORAL at 08:14

## 2022-10-25 RX ADMIN — LISINOPRIL 5 MG: 5 TABLET ORAL at 08:13

## 2022-10-25 RX ADMIN — FUROSEMIDE 20 MG: 20 TABLET ORAL at 08:11

## 2022-10-25 RX ADMIN — INSULIN LISPRO 10 UNITS: 100 INJECTION, SOLUTION INTRAVENOUS; SUBCUTANEOUS at 12:29

## 2022-10-25 RX ADMIN — ATORVASTATIN CALCIUM 40 MG: 40 TABLET, FILM COATED ORAL at 19:59

## 2022-10-25 RX ADMIN — INSULIN LISPRO 10 UNITS: 100 INJECTION, SOLUTION INTRAVENOUS; SUBCUTANEOUS at 18:04

## 2022-10-25 RX ADMIN — APIXABAN 5 MG: 5 TABLET, FILM COATED ORAL at 08:11

## 2022-10-25 RX ADMIN — CYANOCOBALAMIN TAB 1000 MCG 500 MCG: 1000 TAB at 08:15

## 2022-10-25 ASSESSMENT — PAIN SCALES - GENERAL: PAINLEVEL_OUTOF10: 0

## 2022-10-25 NOTE — PROGRESS NOTES
Yuly Loaiza  10/25/2022  [id number]    Chief Complaint: Acute embolic stroke ([address]    Subjective:   No overnight events. No current complaints. Slept ok but didn't take trazodone. Felt it made him sleepy yesterday. ROS: No CP, SOB, dyspnea    Objective:  Patient Vitals for the past 24 hrs:   BP Temp Temp src Pulse Resp SpO2 Weight   10/25/22 0800 (!) 141/62 98 °F (36.7 °C) Oral 59 16 97 % --   10/25/22 0530 -- -- -- -- -- -- 278 lb 4.8 oz (126.2 kg)   10/24/22 1945 135/69 97.9 °F (36.6 °C) Oral 62 18 96 % --   10/24/22 1335 -- -- -- 65 -- -- --   10/24/22 0930 132/82 97.8 °F (36.6 °C) Oral 72 18 95 % --       Gen: No distress, pleasant. Resting in bed  HEENT: Normocephalic, atraumatic  CV: Regular rate and rhythm. No MRG   Resp: No respiratory distress. CTAB   Abd: Soft, nontender nondistended  Ext: No edema   Neuro: Alert, oriented, appropriately interactive. Laboratory data: Available via EMR. Therapy progress:    PT    Supine to Sit: Partial/moderate assistance  Sit to Supine:     Sit to Stand: Partial/moderate assistance  Chair/Bed to Chair Transfer: Partial/moderate assistance  Car Transfer: Partial/moderate assistance  Ambulation 10 ft: Partial/moderate assistance  Ambulation 50 ft: Partial/moderate assistance  Ambulation 150 ft:    Stairs - 1 Step: Partial/moderate assistance  Stairs - 4 Step: Partial/moderate assistance  Stairs - 12 Step: Partial/moderate assistance    OT    Eating:    Oral Hygiene: Setup or clean-up assistance  Bathing: Partial/moderate assistance  Upper Body Dressing: Partial/moderate assistance  Lower Body Dressing: Partial/moderate assistance  Toilet Transfer: Partial/moderate assistance  Toilet Hygiene: Partial/moderate assistance    Speech Therapy    Pt continues to present with Mild  Dysarthria , Cognitive-Linguistic Deficits , and Oropharyngeal Dysphagia .  Pt is demonstrating progress with insight into deficits, attention towards tasks, and carry over of new information. Continue to target right oral motor strength and coordination to facilitate speech precision, safe diet tolerance; and cognitive tasks for safe return to prior level of independence. Body mass index is 39.93 kg/m². Assessment:  Patient Active Problem List   Diagnosis    HTN (hypertension)    DM2 (diabetes mellitus, type 2) ([facility]    High cholesterol    Acute embolic stroke ([facility]       Plan:   Left cerebral hemisphere thromboembolic infarcts: Eliquis, statin. PT/OT/SLP. Started lexapro     Nonrheumatic aortic valve stenosis: S/p TAVR on 10/11. Eliquis     DM: A1c 8.6, Lantus 40 (80), prandial 10 (20), and SSI     DM neuropathy: no current meds     CAD: eliquis, statin     HTN: atenolol 25, lisinopril 5, HCTZ 12.5     HLD: lipitor 40     Hypokalemia: supplement     Bowels: Per protocol  Bladder: Per protocol   Sleep: Trazodone provided prn. Pain: tylenol, tramadol, robaxin PRN   DVT PPx: Eliquis   KAYE: 11/1    Maile Das MD 10/25/2022, 8:26 AM    * This document was created using dictation software. While all precautions were taken to ensure accuracy, errors may have occurred. Please disregard any typographical errors.

## 2022-10-25 NOTE — PROGRESS NOTES
[facility]   Phone: [phone]    Physical Therapy    [] Initial Evaluation            [x] Daily Treatment Note         [] Discharge Summary      Patient: Arik Mendez   : 1960   MRN: [id number]   Date of Service:  10/25/2022  Admitting Diagnosis: Acute embolic stroke [facility])  Current Admission Summary: 76-year-old male with a history of diabetes, MI, HTN, HLD, diabetic neuropathy, and nonrheumatic aortic valve stenosis who was admitted to  on 10/11 for a scheduled TAVR procedure. The procedure was complicated by an episode of V. fib and postoperatively he was noted to have neurologic deficits and work-up revealed a thromboembolic stroke involving the left cerebral territories. Work-up including TTE was unremarkable for PFO. He was initiated on Eliquis for prophylaxis. Patient requires significant insulin at baseline with his normal home regimen of 80 units of Lantus with 20 units Humalog 3 times daily. A1c was 8.6. Due to decreased p.o. intake his regimen was adjusted. He was evaluated by therapy and suggested to continue in an inpatient setting prior to returning home. He reports he is frustrated by his situation but motivated to recover. Past Medical History:  has a past medical history of Carbuncle, DM2 (diabetes mellitus, type 2) ([address] ED (erectile dysfunction), History of heart attack, HTN (hypertension), Hyperlipidemia, Peripheral neuropathy, Sleep apnea, and Wears glasses. Past Surgical History:  has a past surgical history that includes Cardiac catheterization (); Pilonidal cyst excision; cyst removal; cardiovascular stress test (2012); cardiovascular stress test (2019); back surgery; Colonoscopy (2021); and Colonoscopy (N/A, 2021).   Discharge Recommendations: home with home health PT  DME Required For Discharge: DME to be determined pending patient progress  Precautions/Restrictions: high fall risk  Weight Bearing Restrictions: weight bearing as tolerated  [] Right Upper Extremity  [] Left Upper Extremity [] Right Lower Extremity  [] Left Lower Extremity     Required Braces/Orthotics: no braces required   [] Right  [] Left  Positional Restrictions:no positional restrictions    Pre-Admission Information   Lives With: spouse    Type of Home: house  Home Layout: one level  Home Access:  5 step to enter without rails   Bathroom Layout: walker accessible, wheelchair accessible, tub only  Bathroom Equipment: hand held shower head, will use shower chair  Toilet Height: standard height- low  Home Equipment: single point cane, manual wheelchair  Transfer Assistance: Independent without use of device  Ambulation Assistance:Independent without use of device  ADL Assistance: independent with all ADL's, help with putting on socks  IADL Assistance: requires assistance with all homemaking tasks, would mow at home, handles his own medications  Active :        [x] Yes  [] No  Hand Dominance: [] Left  [x] Right  Current Employment: retired. Occupation: Engineering  Hobbies: Drive, fish, working on the car, yard work  [employer]: once a week in yard, has slipped a few times in shower        Subjective  General: Seated in chair at arrival. Agreed to therapy. Reporting some shoulder discomfort but did not rate. Pain: 0/10  Pain Interventions: not applicable       Functional Mobility  Bed Mobility  Bed mobility not completed on this date. Comments: in chair at start and end of session   Transfers  Sit to stand transfer: minimal assistance  Stand to sit transfer: minimal assistance  Stand pivot transfer: minimal assistance  Comments: Transfers ranged from CGA to min A. Assist for R hand placement on seated surface to assist with push-off to standing, cues for proper feet placement. Pt performed multiple stands and 4 SPT during session.    Ambulation  Surface:level surface  Assistive Device: rolling walker  Other Appliance: (R), walker splint attachment  Assistance: contact guard assistance, minimal assistance   Distance: 74 ft and 47 ft   Gait Mechanics: R knee unsteady but no significant buckling occurred. RLE ER with decreased step height and length. Trunk slightly rotated right. Decreased pelvic rotation and hip extension. Comments: Pt more aware of when brief standing rest breaks needed to correct posture or when fatigued. Stair Mobility  Stair mobility not completed on this date. Comments:   Wheelchair Mobility:  No w/c mobility completed on this date. Comments:   Balance  Static Sitting Balance: fair (-): maintains balance at CGA with use of UE support  Dynamic Sitting Balance: fair (-): maintains balance at CGA with use of UE support  Static Standing Balance: fair (-): maintains balance at CGA with use of UE support  Dynamic Standing Balance: fair (-): maintains balance at CGA with use of UE support  Comments:     Other Therapeutic Interventions  First Session:   See functional mobility details above. Seated stepper x 5 minutes total (2.5 minutes with BLE and LUE with RLE in wb position on side arm rest and 2.5 minutes including RUE with hand over hand assist through motion). 6inch LLE taps using BUE support and stabilization at R knee to block both flexion and hyperextension 2x10 reps followed by RLE target taps on 6inch step (3 directions: fwd and diagonals) x 5 each with min A for Hip flexion to return LE to ground. Pt returned to recliner, alarm on and needs in reach. Second Session:   Pt in chair on arrival. Pt performed stand pivot transfer with CGA from chair to . Pt was taken to parallel bars to don harness to perform LiteGait activity. While performing LiteGait, pt required manual assistance to help shift his weight to his L side, while receiving assistance of another to help bring his R leg up and clear his foot with each step.  As pt fatigued with activity, he struggled to weight shift to his L and required additional assistance (see OT note). Pt had Ace bandage wrapped around ankle and foot to assist with dorsiflexion. Pt ambulated for 4 trials consisting of 2 minutes at . 9mph, 40 seconds at 1.1 mph with Amarilis for weight shift, 80 seconds at 1.3 mph with Amarilis for weigh shift, 1 minute 27 seconds at 1.2 mph with maxA for weight shift. Pt was lowered back into his wc, and was returned to his room and performed a stand pivot with Amarilis back to his recliner with alarm on and all needs within reach. Functional Outcomes                 Cognition  WFL  Orientation:    alert and oriented x 4  Command Following:   [facility]    Education  Barriers To Learning: none  Patient Education: patient educated on goals, PT role and benefits, general safety, functional mobility training, proper use of assistive device/equipment, transfer training  Learning Assessment:  patient verbalizes and demonstrates understanding    Assessment  Activity Tolerance: decreased endurance  Impairments Requiring Therapeutic Intervention: decreased functional mobility, decreased ADL status, decreased ROM, decreased strength, decreased safety awareness, decreased endurance, decreased sensation, decreased balance  Prognosis: good  Clinical Assessment:  Pt continues to present with impaired functional mobility and R sided UE/LE strength and ROM deficits. Pt limited by fatigue and low endurance, as he begins to compensate and the quality of his movement quickly deteriorates. Pt becoming more aware of fatigue and need for rest breaks prior to continued mobility. Additional skilled PT to promote return towards PLOF.   Safety Interventions: patient left in chair, chair alarm in place, call light within reach, and patient at risk for falls    Plan  Frequency: 5 x/week, 90 min/day  Current Treatment Recommendations: strengthening, ROM, balance training, functional mobility training, transfer training, gait training, stair training, endurance training, neuromuscular re-education, ADL/self-care training, and home management training    Goals  Patient Goals: Get back to driving, get back to moving around without falling   Short Term Goals:  Time Frame: 2 weeks  Patient will complete bed mobility at modified independent   Patient will complete transfers at [facility]   Patient will ambulate 150 ft with use of LRAD at [facility]  Patient will ascend/descend 8 stairs with (B) handrail at stand by assistance  Patient will complete car transfer at [facility]    Therapy Session Time      Individual Group Co-treatment   Time In 1026      Time Out 1100      Minutes 34         Individual Group Co-treatment   Time In    1334   Time Out    1415   Minutes    41     Timed Code Treatment Minutes:  34 + 41      Total Treatment Minutes:  75        Electronically Signed By: Salomon Salazar, [id number]  2nd session:  [address], [facility]  Therapist was present, directed the patient's care, made skilled judgement, and was responsible for assessment and treatment of the patient. Co-signed and supervised by:  Sole Long DPT [id number]

## 2022-10-25 NOTE — PLAN OF CARE
Problem: Safety - Adult  Goal: Free from fall injury  Outcome: Progressing  Flowsheets (Taken 10/25/2022 1855)  Free From Fall Injury: Instruct family/caregiver on patient safety     Problem: Chronic Conditions and Co-morbidities  Goal: Patient's chronic conditions and co-morbidity symptoms are monitored and maintained or improved  Outcome: Progressing  Flowsheets (Taken 10/24/2022 0930 by Dylan Bolden RN)  Care Plan - Patient's Chronic Conditions and Co-Morbidity Symptoms are Monitored and Maintained or Improved: Monitor and assess patient's chronic conditions and comorbid symptoms for stability, deterioration, or improvement

## 2022-10-25 NOTE — PROGRESS NOTES
[facility] Department   Phone: [phone]    Occupational Therapy    [] Initial Evaluation            [x] Daily Treatment Note         [] Discharge Summary      Patient: Pierre Hunter   : 1960   MRN: [id number]   Date of Service:  10/25/2022    Admitting Diagnosis:  Acute embolic stroke [facility])  Current Admission Summary: 56-year-old male with a history of diabetes, MI, HTN, HLD, diabetic neuropathy, and nonrheumatic aortic valve stenosis who was admitted to  on 10/11 for a scheduled TAVR procedure. The procedure was complicated by an episode of V. fib and postoperatively he was noted to have neurologic deficits and work-up revealed a thromboembolic stroke involving the left cerebral territories. Work-up including TTE was unremarkable for PFO. He was initiated on Eliquis for prophylaxis. Patient requires significant insulin at baseline with his normal home regimen of 80 units of Lantus with 20 units Humalog 3 times daily. A1c was 8.6. Due to decreased p.o. intake his regimen was adjusted. He was evaluated by therapy and suggested to continue in an inpatient setting prior to returning home  Past Medical History:  has a past medical history of Carbuncle, DM2 (diabetes mellitus, type 2) ([address] ED (erectile dysfunction), History of heart attack, HTN (hypertension), Hyperlipidemia, Peripheral neuropathy, Sleep apnea, and Wears glasses. Past Surgical History:  has a past surgical history that includes Cardiac catheterization (); Pilonidal cyst excision; cyst removal; cardiovascular stress test (2012); cardiovascular stress test (2019); back surgery; Colonoscopy (2021); and Colonoscopy (N/A, 2021).     Discharge Recommendations: HH OT S3    DME Required For Discharge: TTB, grab bars near commode and in shower ; continue to further update needs pending progress     Precautions/Restrictions: high fall risk, up as tolerated  Weight Bearing Restrictions: no restrictions    Required Braces/Orthotics: no braces required  Positional Restrictions:no positional restrictions    Pre-Admission Information   Lives With: spouse                  Type of Home: house  Home Layout: one level  Home Access:  5 step to enter without rails   Bathroom Layout: walker accessible, wheelchair accessible, tub/shower only  Bathroom Equipment: hand held shower head  Toilet Height: standard height- low  Home Equipment: single point cane, manual wheelchair  Transfer Assistance: Independent without use of device  Ambulation Assistance:Independent without use of device  ADL Assistance: independent with all ADL's with exception of assist to don socks- pt is IND w/ shoes pending shoe style   IADL Assistance: requires assistance with most homemaking tasks; pt IND to mow lawn on riding mower; IND with medications and light meal prep (microwave use/snacks)  Active :        [x] Yes                 [] No  Hand Dominance: [] Left                 [x] Right  Current Employment: retired.   Occupation: Engineering  Hobbies: Drive, fish, working on the car, yard work  [employer]: once a week in yard, has slipped a few times in shower        Subjective:   General: pt in  recliner at entry, agreeable to session and shower; pt's S.O present and provided with BADL training/education throughout session  Pain Interventions: not applicable        Activities of Daily Living  Basic Activities of Daily Living  Upper Extremity Bathing: stand by assistance requires verbal cueing  Lower Extremity Bathing: minimal assistance requires verbal cueing   Bathing Equipment: long handled sponge  Bathing Comments: completed seated on TTB in shower- VC for meli-techniques and problem solving with use of LHS for increased independence  Upper Extremity Dressing: moderate assistance  Lower Extremity Dressing: moderate assistance  Dressing Equipment: reacher  Dressing Comments: use of reacher to doff LB clothing w/ increased time and VC; pt requires assist to don socks; Baron for threading LEs into clothing w/ reacher; Baron for clothing over R hip in stance; pt required assist to thread B UE and to adjust shirt down trunk   General Comments: dressing completed on TTB in shower- use of grab bars for stances; pt easily fatigued post shower level ADL; declined all grooming needs   Instrumental Activities of Daily Living  No IADL completed on this date. Functional Mobility  Bed Mobility  Bed mobility not completed on this date. Comments:  Transfers  Sit to stand transfer:contact guard assistance  Stand to sit transfer: contact guard assistance  Stand pivot transfer: contact guard assistance  Comments:     Functional Mobility:  Sitting Balance: stand by assistance. Standing Balance: contact guard assistance. Functional Mobility: .  contact guard assistance, minimal assistance  Functional Mobility Activity: recliner>bathroom   Functional Mobility Device Use: rolling walker  Functional Mobility Comment: use of RW w/ (R) splint for mobility, assist for turn into shower w/ RW management     Additional Activity:  Seated in w/c unilateral and (B) strengthening: holding cane handle unilaterally w/ R hand pt completed 10 reps x1 of push/pulls , 10 reps x1 of elbow extension with R hand; 10 reps x1 (B) palm gown-grasp push/pulls-- resistance applied by therapist throughout       Second Session:  Pt in recliner at entry, agreeable to cotx session for Lite Gait Harness use over treadmill. Pt denied pain and declined ADL needs. SPTs recliner<>w/c at [facility]. Harness donned in stance in // bars- pt transferred to treadmill in LiteGait. 4 rounds on treadmill: speed 0.9 for 2min + 1.1 for 1min + 1.3 for 80sec + 1.2 for 1min 21sec- dorsi-flex assist to R LE + assist from PT for pattern and stride length, Pt requiring increased assist from OT for weight shifting as rounds progressed up to MaxA. Pt tolerated well w/ rest breaks as needed.  No within sessions, continues to demo motivated for therapy. Pt continues to benefit from intensive inpt therapy to maximize functional independence and safety. Continue with POC.   Safety Interventions: patient left in chair, chair alarm in place, call light within reach, gait belt, and patient at risk for falls    Plan  Frequency: 5 x/week, 60 min/day  Current Treatment Recommendations: strengthening, ROM, balance training, functional mobility training, transfer training, endurance training, neuromuscular re-education, ADL/self-care training, IADL training, home management training, safety education, equipment evaluation/education, and positioning    Goals  Patient Goals: \"get back to driving\", regain IND with mobility    Short Term Goals:  Time Frame: 10-14 days  Patient will complete upper body ADL at supervision   Patient will complete lower body ADL at minimal assistance   Patient will complete toileting at minimal assistance   Patient will complete self-feeding at modified independent   Patient will complete grooming at modified independent   Patient will complete functional transfers at supervision     Long Term Goals:  Time Frame: 2-3 weeks   Patient will complete upper body ADL at modified independent   Patient will complete lower body ADL at modified independent   Patient will complete toileting at modified independent   Patient will complete functional transfers at modified independent   Patient will complete functional mobility at modified independent   Patient to gather and transport IADL items at supervision      -pt progressing towards goals  10/25    Therapy Session Time     Individual Group Co-treatment   Time In 0930      Time Out 1015      Minutes 45         Second Session Therapy Time:   Individual Concurrent Group Co-treatment   Time In       1334   Time Out       1415   Minutes       41   Timed Code Treatment Minutes: 45 + 41   Total Treatment Minutes:  86       Electronically Signed By: Renny Abarca Nahid Tang [id number]

## 2022-10-25 NOTE — PROGRESS NOTES
[facility] Department   Phone: [phone]    Speech Therapy    [] Initial Evaluation            [x] Daily Treatment Note         [] Discharge Summary      Patient: Maliha Green   : 1960   MRN: [id number]   Date of Service:  10/25/2022  Admitting Diagnosis: Acute embolic stroke [facility])  Current Admission Summary: 28-year-old male with a history of diabetes, MI, HTN, HLD, diabetic neuropathy, and nonrheumatic aortic valve stenosis who was admitted to  on 10/11 for a scheduled TAVR procedure. The procedure was complicated by an episode of V. fib and postoperatively he was noted to have neurologic deficits and work-up revealed a thromboembolic stroke involving the left cerebral territories. Work-up including TTE was unremarkable for PFO. He was initiated on Eliquis for prophylaxis. Patient requires significant insulin at baseline with his normal home regimen of 80 units of Lantus with 20 units Humalog 3 times daily. A1c was 8.6. Due to decreased p.o. intake his regimen was adjusted. He was evaluated by therapy and suggested to continue in an inpatient setting prior to returning home. He reports he is frustrated by his situation but motivated to recover. Past Medical History:  has a past medical history of Carbuncle, DM2 (diabetes mellitus, type 2) ([address] ED (erectile dysfunction), History of heart attack, HTN (hypertension), Hyperlipidemia, Peripheral neuropathy, Sleep apnea, and Wears glasses. Past Surgical History:  has a past surgical history that includes Cardiac catheterization (); Pilonidal cyst excision; cyst removal; cardiovascular stress test (2012); cardiovascular stress test (2019); back surgery; Colonoscopy (2021); and Colonoscopy (N/A, 2021).   Precautions/Restrictions: high fall risk, up as tolerated  Instrumental Swallow Study: FEES at outside hospital completed on 10/12/2022      Pre-Admission Information   Living Status: Pt lives with his significant other Owen Case), a dog and cat. 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 Girlfriend present for first session. Pt reports feeling more awake than yesterday.    Pain: Denied pain   Safety Interventions: patient left in chair and chair alarm in place       Therapeutic Interventions:     Session 1:   Dysphagia: Severity: Mild  and Moderate   Goal not targeted this session     Motor Speech: Severity: Mild  and Moderate   Goal not targeted this session     Cognition: Severity: Mild   Medication management (easy, moderate, and complex)   -pt identified errors from 5 different (1 easy, 2 mod, 2 complex) pill organizers with 91% accuracy (31/34)  -once error was recognized pt stated correct dosage needed with 100% accuracy   -pt independently chose functional cognitive organization strategy (checking each day vs checking each pill) based on the amount of prescriptions (prescriptions increased as complexity increased)   -pt identified the dosage of prescriptions with 100% accuracy  -pt with good attention to detail and functional divided attention switching between task and conversation   -pt described system for medication management used at home, pt with no concerns for use of system following d/c    -pt and girlfriend state they think pt is receiving 3 new medications at this time (meds will be reviewed by nursing at time of d/c - will review with pt to increase familiarity of medications)     Session 2:   Dysphagia: Severity: Mild  and Moderate   Patient tolerated NMES via VitalStim placement 4a (targeting the orpharyngeal \"sling\", intrinsic/extrinsic muscles of the tongue and pharyngeal constrictor) @ R 13.0 mA and L 3.5 mA for 21 minutes (reported sensation even on R/L sides). Slight contraction noted under eye/ no contraction noted to R upper labial surface. Utilized in combination with oral motor exercises and motor speech tasks (see below). Motor Speech: Severity: Mild  and Moderate   Oral motor exercises (OME) to target R labial strength and ROM (utilized mirror to promote facial symmetry with tasks)   -Labial retraction/protrusion x 10   -Jaw drop/ open mouth pucker x 10   -bilateral cheek puffs against resistance x 10   -unilateral alternating cheek puffs x 10   -alternating e/o x 10   -buccal strengthening (fish face) x 10   -jaw opening against resistance x 10   -lip smacks x 10   -pt education provided re: rationale for OME     Motor speech exercises   -alternating movements (A/E/I/O/U) x 5, focusing on over exaggeration and clear articulation, pt increased speed still focusing on clear articulation x 5  -pt produced 3 syllable word (buttercup) with clear articulation x 5  -pt produced 4 syllable phrase () with clear articulation x 5   -pt produced 5 syllable phrase (independence day) focusing on clear articulation x 5, pt with increased difficulty producing 5 syllable word. Pt repeated phrase slowly tapping out each syllable before attempting again. Pt with increased clear articulation following slowing down.   -pt completed tongue twisters x 7. Pt instructed to go slow and focus on clear articulation.  Pt aware and self correcting articulation errors, errors increased with rapid rate of speech       -pt reports decreased speech intelligibility later in the day secondary to fatigue  -education provided re: speech strategies (I.e. go slow, speak loud, over articulate)        Cognition: Severity: Mild     Goal not targeted this session     Additional Interventions:      Education  Barriers To Learning: none  Patient Education: Provided education regarding role of SLP, rationale for treatment tasks and general speech pathology plan of care. Learning Assessment: Pt verbalized understanding     Assessment  Impairments Requiring Therapeutic Intervention: Dysarthria , Cognitive-Linguistic Deficits , and Oropharyngeal Dysphagia   Prognosis: good    Clinical Assessment:  Pt continues to present with Mild  Dysarthria , Cognitive-Linguistic Deficits , and Oropharyngeal Dysphagia . Pt is demonstrating progress with insight into deficits, self correction of errors, and sustained attention to task. He is progressing with use of NMES and oropharyngeal strengthening. Continue to target right oral motor strength and coordination to facilitate speech precision, safe diet tolerance; and cognitive tasks for safe return to prior level of independence. Diet Solids Recommendation:   Liquid Consistency Recommendation:   Recommended Form of Meds:   Regular texture diet     Thin liquids     Meds whole with water       Recommended Compensatory Swallowing Strategies: Upright as possible with all PO intake , No straws , Small bites/sips , Eat/feed slowly, Remain upright 30-45 min       Plan  Frequency: 60 minutes/day; 5 days per week, as tolerated, until goals met, or discharged from ARU. Therapeutic Interventions: Diet Tolerance Monitoring  Speech / Motor Planning / Voice intervention  and Cognitive-Linguistic intervention   Discharge Recommendations: Home independently  Continued SLP at Discharge: TBD based upon progress     Goals  Patient Goals: \"improve my speech\"   Time Frame: 2 weeks     Pt will tolerate recommended diet and advanced trials with no overt s/s of aspiration. Ongoing  Pt will improve oral motor strength and ROM for coordinated and alternating motions, via graded tasks to improve speech back to baseline level as subjectively rated by SLP/pt and family.  Ongoing   Pt will improve executive function for multifactor task completion via graded tasks to 80% accuracy. Ongoing   Pt will improve attention to detail for graded complex information to 80%, for improved recall and retention of newly learned information. Ongoing       Therapy Session Time      Session 1 Session 2   Time In 0900 1245   Time Out 0930 1315   Time Code Minutes 30 0   Individual Minutes 30 30     Timed Code Treatment Minutes: 30  Total Treatment Minutes:  60    Electronically Signed By:  Aster SEGUNDO [facility] #[id number] 10/25/2022 10:46 AM  Speech-Language Pathologist    Williams SARAH,  Speech-Language Pathology     The speech-language pathologist was present, directed the patient's care, made skilled judgment and was responsible for assessment and treatment.

## 2022-10-25 NOTE — PROGRESS NOTES
Nutrition Note    RECOMMENDATIONS  No nutrition rec's @ this time. NUTRITION ASSESSMENT   pt continues to eat greater than 75% of meals on a CCC diet. Discussed education needs for DM; pt requests written information to review with girlfriend. Education packet provided with encouragement to not skip meals & keep portions of carbs small. No further nutrition concerns expressed @ this time. Nutrition Related Findings: LBM 10/24; +2 edema RUE, BLE; gluc 151  Wounds: None  Nutrition Education:  Education initiated [facility])   Nutrition Goals: PO intake 75% or greater     MALNUTRITION ASSESSMENT      Malnutrition Status: No malnutrition    NUTRITION DIAGNOSIS   No nutrition diagnosis at this time     CURRENT NUTRITION THERAPIES  ADULT DIET; Regular; 4 carb choices (60 gm/meal)     PO Intake: %   PO Supplement Intake:None Ordered    ANTHROPOMETRICS  Current Height: 5' 10\" (177.8 cm)  Current Weight: 278 lb 4.8 oz (126.2 kg)    Ideal Body Weight (IBW): 166 lbs  (75 kg)      BMI: 39.9      The patient will be monitored per nutrition standards of care. Consult dietitian if additional nutrition interventions are needed prior to RD reassessment.      Mayuri Flores RD, LD    Contact: [phone]

## 2022-10-26 LAB
GLUCOSE BLD-MCNC: 149 MG/DL (ref 70–99)
GLUCOSE BLD-MCNC: 166 MG/DL (ref 70–99)
GLUCOSE BLD-MCNC: 181 MG/DL (ref 70–99)
GLUCOSE BLD-MCNC: 223 MG/DL (ref 70–99)
GLUCOSE BLD-MCNC: 247 MG/DL (ref 70–99)
PERFORMED ON: ABNORMAL

## 2022-10-26 PROCEDURE — 97530 THERAPEUTIC ACTIVITIES: CPT

## 2022-10-26 PROCEDURE — 97129 THER IVNTJ 1ST 15 MIN: CPT

## 2022-10-26 PROCEDURE — 92526 ORAL FUNCTION THERAPY: CPT

## 2022-10-26 PROCEDURE — 97535 SELF CARE MNGMENT TRAINING: CPT

## 2022-10-26 PROCEDURE — 97130 THER IVNTJ EA ADDL 15 MIN: CPT

## 2022-10-26 PROCEDURE — 97112 NEUROMUSCULAR REEDUCATION: CPT

## 2022-10-26 PROCEDURE — 97110 THERAPEUTIC EXERCISES: CPT

## 2022-10-26 PROCEDURE — 6370000000 HC RX 637 (ALT 250 FOR IP): Performed by: PHYSICAL MEDICINE & REHABILITATION

## 2022-10-26 PROCEDURE — 92507 TX SP LANG VOICE COMM INDIV: CPT

## 2022-10-26 PROCEDURE — 1280000000 HC REHAB R&B

## 2022-10-26 RX ORDER — INSULIN GLARGINE 100 [IU]/ML
20 INJECTION, SOLUTION SUBCUTANEOUS NIGHTLY
Status: DISCONTINUED | OUTPATIENT
Start: 2022-10-26 | End: 2022-10-27

## 2022-10-26 RX ORDER — INSULIN LISPRO 100 [IU]/ML
5 INJECTION, SOLUTION INTRAVENOUS; SUBCUTANEOUS
Status: DISCONTINUED | OUTPATIENT
Start: 2022-10-26 | End: 2022-11-01 | Stop reason: HOSPADM

## 2022-10-26 RX ADMIN — ATENOLOL 25 MG: 50 TABLET ORAL at 08:40

## 2022-10-26 RX ADMIN — INSULIN LISPRO 5 UNITS: 100 INJECTION, SOLUTION INTRAVENOUS; SUBCUTANEOUS at 12:12

## 2022-10-26 RX ADMIN — CAPSAICIN: 0.25 CREAM TOPICAL at 20:38

## 2022-10-26 RX ADMIN — INSULIN LISPRO 5 UNITS: 100 INJECTION, SOLUTION INTRAVENOUS; SUBCUTANEOUS at 17:40

## 2022-10-26 RX ADMIN — APIXABAN 5 MG: 5 TABLET, FILM COATED ORAL at 20:38

## 2022-10-26 RX ADMIN — INSULIN LISPRO 2 UNITS: 100 INJECTION, SOLUTION INTRAVENOUS; SUBCUTANEOUS at 08:51

## 2022-10-26 RX ADMIN — ESCITALOPRAM OXALATE 5 MG: 10 TABLET ORAL at 08:39

## 2022-10-26 RX ADMIN — HYDROCHLOROTHIAZIDE 12.5 MG: 25 TABLET ORAL at 08:40

## 2022-10-26 RX ADMIN — INSULIN LISPRO 5 UNITS: 100 INJECTION, SOLUTION INTRAVENOUS; SUBCUTANEOUS at 08:41

## 2022-10-26 RX ADMIN — LOPERAMIDE HYDROCHLORIDE 2 MG: 2 CAPSULE ORAL at 21:02

## 2022-10-26 RX ADMIN — CAPSAICIN: 0.25 CREAM TOPICAL at 08:41

## 2022-10-26 RX ADMIN — INSULIN GLARGINE 20 UNITS: 100 INJECTION, SOLUTION SUBCUTANEOUS at 20:16

## 2022-10-26 RX ADMIN — APIXABAN 5 MG: 5 TABLET, FILM COATED ORAL at 08:40

## 2022-10-26 RX ADMIN — ATORVASTATIN CALCIUM 40 MG: 40 TABLET, FILM COATED ORAL at 20:38

## 2022-10-26 RX ADMIN — LISINOPRIL 5 MG: 5 TABLET ORAL at 08:40

## 2022-10-26 RX ADMIN — POTASSIUM CHLORIDE 20 MEQ: 1500 TABLET, EXTENDED RELEASE ORAL at 08:40

## 2022-10-26 RX ADMIN — INSULIN LISPRO 2 UNITS: 100 INJECTION, SOLUTION INTRAVENOUS; SUBCUTANEOUS at 12:14

## 2022-10-26 ASSESSMENT — PAIN DESCRIPTION - DESCRIPTORS: DESCRIPTORS: ACHING;DISCOMFORT

## 2022-10-26 ASSESSMENT — PAIN DESCRIPTION - ORIENTATION: ORIENTATION: LOWER

## 2022-10-26 ASSESSMENT — PAIN DESCRIPTION - ONSET: ONSET: ON-GOING

## 2022-10-26 ASSESSMENT — PAIN DESCRIPTION - FREQUENCY: FREQUENCY: CONTINUOUS

## 2022-10-26 ASSESSMENT — PAIN - FUNCTIONAL ASSESSMENT: PAIN_FUNCTIONAL_ASSESSMENT: ACTIVITIES ARE NOT PREVENTED

## 2022-10-26 ASSESSMENT — PAIN DESCRIPTION - PAIN TYPE: TYPE: CHRONIC PAIN

## 2022-10-26 NOTE — PROGRESS NOTES
[facility] Department   Phone: [phone]    Physical Therapy    [] Initial Evaluation            [x] Daily Treatment Note         [] Discharge Summary      Patient: Aubree Gutierrez   : 1960   MRN: [id number]   Date of Service:  10/26/2022  Admitting Diagnosis: Acute embolic stroke [facility])  Current Admission Summary: 76-year-old male with a history of diabetes, MI, HTN, HLD, diabetic neuropathy, and nonrheumatic aortic valve stenosis who was admitted to  on 10/11 for a scheduled TAVR procedure. The procedure was complicated by an episode of V. fib and postoperatively he was noted to have neurologic deficits and work-up revealed a thromboembolic stroke involving the left cerebral territories. Work-up including TTE was unremarkable for PFO. He was initiated on Eliquis for prophylaxis. Patient requires significant insulin at baseline with his normal home regimen of 80 units of Lantus with 20 units Humalog 3 times daily. A1c was 8.6. Due to decreased p.o. intake his regimen was adjusted. He was evaluated by therapy and suggested to continue in an inpatient setting prior to returning home. He reports he is frustrated by his situation but motivated to recover. Past Medical History:  has a past medical history of Carbuncle, DM2 (diabetes mellitus, type 2) ([address] ED (erectile dysfunction), History of heart attack, HTN (hypertension), Hyperlipidemia, Peripheral neuropathy, Sleep apnea, and Wears glasses. Past Surgical History:  has a past surgical history that includes Cardiac catheterization (); Pilonidal cyst excision; cyst removal; cardiovascular stress test (2012); cardiovascular stress test (2019); back surgery; Colonoscopy (2021); and Colonoscopy (N/A, 2021).   Discharge Recommendations: home with home health PT  DME Required For Discharge: rolling walker, wheelchair, ramp  Precautions/Restrictions: high fall risk  Weight Bearing Restrictions: weight bearing as tolerated  [] Right Upper Extremity  [] Left Upper Extremity [] Right Lower Extremity  [] Left Lower Extremity     Required Braces/Orthotics: no braces required   [] Right  [] Left  Positional Restrictions:no positional restrictions    Pre-Admission Information   Lives With: spouse    Type of Home: house  Home Layout: one level  Home Access:  5 step to enter without rails   Bathroom Layout: walker accessible, wheelchair accessible, tub only  Bathroom Equipment: hand held shower head, will use shower chair  Toilet Height: standard height- low  Home Equipment: single point cane, manual wheelchair  Transfer Assistance: Independent without use of device  Ambulation Assistance:Independent without use of device  ADL Assistance: independent with all ADL's, help with putting on socks  IADL Assistance: requires assistance with all homemaking tasks, would mow at home, handles his own medications  Active :        [x] Yes  [] No  Hand Dominance: [] Left  [x] Right  Current Employment: retired. Occupation: Engineering  Hobbies: Drive, fish, working on the car, yard work  [employer]: once a week in yard, has slipped a few times in shower        Subjective  General: Seated in chair at arrival. Agreed to therapy. Reporting sleeping okay but was experiencing twitching in his R leg throughout the night. Pain: 0/10  Pain Interventions: not applicable       Functional Mobility  Bed Mobility  Bed mobility not completed on this date. Comments:   Transfers  Sit to stand transfer: contact guard assistance, minimal assistance  Stand to sit transfer: contact guard assistance, minimal assistance  Stand pivot transfer: contact guard assistance, minimal assistance  Comments: Assistance with transfers continue to vary based off his fatigue levels. Ambulation  Ambulation not tested on this date secondary to fatigue. Focused on addressing gait deficits through other functional activities.  .   Distance: Gait Mechanics:   Comments:     Stair Mobility  Stair mobility not completed on this date. Comments:   Wheelchair Mobility:  Chair: manual  Surface: level surface  Method: (R) LE and (L) LE  Distance: 80' + 45 ft   Comments:   Balance  Static Sitting Balance: fair (-): maintains balance at CGA with use of UE support  Dynamic Sitting Balance: fair (-): maintains balance at CGA with use of UE support  Static Standing Balance: fair (-): maintains balance at CGA with use of UE support  Dynamic Standing Balance: fair (-): maintains balance at CGA with use of UE support  Comments:     Other Therapeutic Interventions  First Session:   Pt in chair on arrival. Performed stand pivot transfer from recliner to wc with CGA. Pt was taken to gym and performed stand pivot transfer with CGA from wc to seated stepper. Pt performed seated stepper for 8 min, and was able to use his R UE for 6 min before holding it on the handrail for the last 2 min. Throughout the 6 min he used his R UE, pt's hand would occasionally slip off requiring him to pause and re- again. Pt was taken to [employer], where he performed sidestepping with CGA and standing B marches with CGA. Facilitation required for appropriate pelvic alignment. When in neutral pelvis, pt with decreased R hip flexion. Pt manually propelled wc 94 ft with B LE to return to his room. Pt also performed standing push-pulls with chair, and required CGA. The push-pulls were completed in both in normal standing, and in semi-tandem stance. When the L LE was forward, pt required blocking of the L hip to prevent excessive hip rotation. Pt performed a stand pivot transfer with Amarilis from wc to recliner, and pt was left with alarm set and all needs within reach. Second Session:   Pt in chair on arrival. Pt performed stand pivot transfer from recliner to wc with CGA.  Pt reported concern over potential lack of family support for hours at a time when he goes home, reports that he has other potential options for assistance if needed. Pt was also asked to have significant other take pictures of their home layout in order to best help him prepare for when he goes home. Pt performed stand pivot with CGA to mat table in gym. Pt performed 2 X 10 bridges and 2 X 10 dying bugs. Pt also performed X 10 chest press with 4# ball in sitting, and X 5 B UE chops with 4# ball in sitting. Pt manually propelled wc 45 feet. Pt was returned to room and performed stand pivot transfer with Amarilis to recliner, and was left with alarm on and all needs within reach. Functional Outcomes                 Cognition  WFL  Orientation:    alert and oriented x 4  Command Following:   [facility]    Education  Barriers To Learning: none  Patient Education: patient educated on goals, PT role and benefits, general safety, functional mobility training, proper use of assistive device/equipment, transfer training  Learning Assessment:  patient verbalizes and demonstrates understanding    Assessment  Activity Tolerance: decreased endurance  Impairments Requiring Therapeutic Intervention: decreased functional mobility, decreased ADL status, decreased ROM, decreased strength, decreased safety awareness, decreased endurance, decreased sensation, decreased balance  Prognosis: good  Clinical Assessment:  Pt continues to present with impaired functional mobility and R sided UE/LE strength and ROM deficits. Pt's deficits continue to worsen as he fatigues, as he attempts to compensate much more. Pt continues to require skilled ARU physical therapy services in order to address deficits and promote a safe return to home.    Safety Interventions: patient left in chair, chair alarm in place, call light within reach, and patient at risk for falls    Plan  Frequency: 5 x/week, 90 min/day  Current Treatment Recommendations: strengthening, ROM, balance training, functional mobility training, transfer training, gait training, stair training, endurance training, neuromuscular re-education, ADL/self-care training, and home management training    Goals  Patient Goals: Get back to driving, get back to moving around without falling   Short Term Goals:  Time Frame: 2 weeks  Patient will complete bed mobility at modified independent   Patient will complete transfers at [facility]   Patient will ambulate 150 ft with use of LRAD at [facility]  Patient will ascend/descend 8 stairs with (B) handrail at stand by assistance  Patient will complete car transfer at [address] [facility]    Therapy Session Time      Individual Group Co-treatment   Time In  730      Time Out  815      Minutes  45         Individual Group Co-treatment   Time In 1415      Time Out 1445      Minutes 30        Timed Code Treatment Minutes:  45 + 30      Total Treatment Minutes:  75        Electronically Signed By: Cynthia Truong, [id number]  2nd session:  [address] [facility], [facility]  Therapist was present, directed the patient's care, made skilled judgement, and was responsible for assessment and treatment of the patient. Co-signed and supervised by:  Cynthia Truong DPT [id number]

## 2022-10-26 NOTE — PROGRESS NOTES
[facility] Department   Phone: [phone]    Occupational Therapy    [] Initial Evaluation            [x] Daily Treatment Note         [] Discharge Summary      Patient: Andria Sinha   : 1960   MRN: [id number]   Date of Service:  10/26/2022    Admitting Diagnosis:  Acute embolic stroke [facility])  Current Admission Summary: 58-year-old male with a history of diabetes, MI, HTN, HLD, diabetic neuropathy, and nonrheumatic aortic valve stenosis who was admitted to  on 10/11 for a scheduled TAVR procedure. The procedure was complicated by an episode of V. fib and postoperatively he was noted to have neurologic deficits and work-up revealed a thromboembolic stroke involving the left cerebral territories. Work-up including TTE was unremarkable for PFO. He was initiated on Eliquis for prophylaxis. Patient requires significant insulin at baseline with his normal home regimen of 80 units of Lantus with 20 units Humalog 3 times daily. A1c was 8.6. Due to decreased p.o. intake his regimen was adjusted. He was evaluated by therapy and suggested to continue in an inpatient setting prior to returning home  Past Medical History:  has a past medical history of Carbuncle, DM2 (diabetes mellitus, type 2) ([address] ED (erectile dysfunction), History of heart attack, HTN (hypertension), Hyperlipidemia, Peripheral neuropathy, Sleep apnea, and Wears glasses. Past Surgical History:  has a past surgical history that includes Cardiac catheterization (); Pilonidal cyst excision; cyst removal; cardiovascular stress test (2012); cardiovascular stress test (2019); back surgery; Colonoscopy (2021); and Colonoscopy (N/A, 2021).     Discharge Recommendations: HH OT S3    DME Required For Discharge: TTB with cut out, grab bars near commode and in shower ; continue to further update needs pending progress     Precautions/Restrictions: high fall risk, up as tolerated  Weight Bearing Restrictions: no restrictions    Required Braces/Orthotics: no braces required  Positional Restrictions:no positional restrictions    Pre-Admission Information   Lives With: spouse                  Type of Home: house  Home Layout: one level  Home Access:  5 step to enter without rails   Bathroom Layout: walker accessible, wheelchair accessible, tub/shower only  Bathroom Equipment: hand held shower head  Toilet Height: standard height- low  Home Equipment: single point cane, manual wheelchair  Transfer Assistance: Independent without use of device  Ambulation Assistance:Independent without use of device  ADL Assistance: independent with all ADL's with exception of assist to don socks- pt is IND w/ shoes pending shoe style   IADL Assistance: requires assistance with most homemaking tasks; pt IND to mow lawn on riding mower; IND with medications and light meal prep (microwave use/snacks)  Active :        [x] Yes                 [] No  Hand Dominance: [] Left                 [x] Right  Current Employment: retired. Occupation: Engineering  Hobbies: Drive, fish, working on the car, yard work  [employer]: once a week in yard, has slipped a few times in shower        Subjective:   General: Pt in recliner at entry, agreeable to OT session.    Pain Interventions: not applicable        Activities of Daily Living  Basic Activities of Daily Living  Grooming: stand by assistance  Grooming Comments: seated in wheelchair at sink shaving and completing set up for oral care using mouthwash  Upper Extremity Dressing: minimal assistance requires verbal cueing Increased time to complete task  Dressing Comments: seated in wheelchair; pt required verbal cues to thread B UE and to adjust shirt down trunk; pt attempting UB dressing a 2nd time after receiving education on meli dressing techniques with improved donning of a shirt and threading of affected side   General Comments: pt declined completing grooming tasks in stance and requested to remain seated in wheelchair   Instrumental Activities of Daily Living  No IADL completed on this date. Functional Mobility  Bed Mobility  Bed mobility not completed on this date. Comments:  Transfers  Sit to stand transfer:contact guard assistance  Stand to sit transfer: contact guard assistance  Bed / Chair transfer: contact guard assistance. Comments: no LOB; cuing for hand placement and walker placement    Functional Mobility:  Sitting Balance: stand by assistance. Standing Balance: contact guard assistance. Functional Mobility: .  contact guard assistance  Functional Mobility Activity: to/from bathroom  Functional Mobility Device Use: rolling walker  Functional Mobility Comment: use of RW w/ (R) splint for mobility, cues to don velco strapping on R splint; pt demo'd impulsivity with turning and sitting    Additional Activity:  Seated in recliner unilateral and bilateral ROM activities completed: AAROM overhead and and bicep curls (5 reps 1x) completed on R UE to facilitate ROM and scapular retraction/protraction for improved strength and mobility for functional tasks. Holding cane with BUE completing forward chest press with resistance pushing and pulling to improve hand grasp, scapular mobility and strengthening of the R shoulder complex. Supported scapular movement throughout exercises to ensure proper form/technique and increased facilitation of targeted muscle groups and joints. Second Session:  Pt in recliner upon arrival, agreeable to OT session. Pt declined all ADLs. Functional mobility:    Ambulated from recliner to doorway (~10-15') CGA with RW. Pt securing R hand within walker without cues. Transfers:   Sit <> stand: CGA   SPT: CGA to/from mat and wheelchair    Bed mobility:   Sit to supine: SBA   Supine to sit: SBA   Rolling: L/R SBA   Comment: Completed on mat table.     Neuro re-education:  -Sitting edge of mat, pt completed a series of functional WB exercises for strength and neurological return within RUE. -Pt prone on mat table bearing weight through (B) forearms. Therapist assisting with RUE positioning by adding support on R anterior shoulder and R scapula. Pt completed 3 exercises 10x for 2 reps. Exercises include: weight shifting on forearms laterally, \"cat/camel\" positioning for scapular protraction/retraction, and weight bearing through RUE while LUE reached out of base of support. Pt reported no pain during exercises. -Side lying for gravity assisted, pt completing  shoulder mobilization for elevation/depression, protraction/retraction and ab/adduction with therapist providing overpressure to shoulder complex during AROM. Pt reported slight pain when completing lateral raises but was repositioned and pain declined. -Sitting edge of mat, pt completed repetitive functional reaching tasks, crossing midline with R UE with focus on trunk rotation, arm extension, and scapular retraction       Pt left in recliner, chair alarm on, call light and needs in reach.             Cognition  Overall Cognitive Status: WFL  Arousal/Alterness: appropriate responses to stimuli  Following Commands: follows all commands without difficulty  Attention Span: attends with cues to redirect  Safety Judgement: decreased awareness of need for assistance, decreased awareness of need for safety  Problem Solving: assistance required to implement solutions  Insights: fully aware of deficits  Initiation: requires cues for some  Sequencing: requires cues for some  Comment: cog appears overall WFL based on functional tasks completed; dysarthric speech  Orientation:    alert and oriented x 4  Command Following:   [facility]     Education  Barriers To Learning: none  Patient Education: patient educated on goals, OT role and benefits, plan of care, ADL adaptive strategies, proper use of assistive device/equipment, adaptive device training, transfer training, discharge recommendations, AROM of involved R UE  Learning Assessment:  patient verbalizes understanding, would benefit from continued reinforcement    Assessment  Activity Tolerance: tolerated well, requires rest breaks through activities. Impairments Requiring Therapeutic Intervention: decreased functional mobility, decreased ADL status, decreased ROM, decreased strength, decreased safety awareness, decreased endurance, decreased balance, decreased IADL, decreased fine motor control, decreased coordination  Prognosis: good  Clinical Assessment: Pt is a 60 yr old male who presents post CVA w/ primarily R side and speech/language deficits. Pt is below baseline level of function d/t above deficits. Pt is progressing well- continues to required Min-CGA for mobility/transfers. Pt completed a series of RUE exercises for additional neuro-re education. Pt easily fatigued w/ functional activity- participates well within sessions, continues to demo motivated for therapy. Pt continues to benefit from intensive inpt therapy to maximize functional independence and safety. Continue with POC.   Safety Interventions: patient left in chair, chair alarm in place, call light within reach, gait belt, and patient at risk for falls    Plan  Frequency: 5 x/week, 60 min/day  Current Treatment Recommendations: strengthening, ROM, balance training, functional mobility training, transfer training, endurance training, neuromuscular re-education, ADL/self-care training, IADL training, home management training, safety education, equipment evaluation/education, and positioning    Goals  Patient Goals: \"get back to driving\", regain IND with mobility    Short Term Goals:  Time Frame: 10-14 days  Patient will complete upper body ADL at supervision   Patient will complete lower body ADL at minimal assistance   Patient will complete toileting at minimal assistance   Patient will complete self-feeding at modified independent   Patient will complete grooming at modified independent Patient will complete functional transfers at supervision     Long Term Goals:  Time Frame: 2-3 weeks   Patient will complete upper body ADL at modified independent   Patient will complete lower body ADL at modified independent   Patient will complete toileting at modified independent   Patient will complete functional transfers at modified independent   Patient will complete functional mobility at modified independent   Patient to gather and transport IADL items at supervision      -pt progressing towards goals  10/26    Therapy Session Time     Individual Group Co-treatment   Time In 1030      Time Out 1100      Minutes 30         Second Session Therapy Time:   Individual Concurrent Group Co-treatment   Time In 1305        Time Out 1350        Minutes 45        Timed Code Treatment Minutes: 30+45  Total Treatment Minutes:  75       Electronically Signed By: LUIS Rich/ODIN  Supervision/direction Nino Barcenas OTR/L [id number], 10/26/2022, 4:04 PM

## 2022-10-26 NOTE — PATIENT CARE CONFERENCE
[facility]  Inpatient Rehabilitation  Weekly Team Conference Note    Patient Name: Cierra Hurley        MRN: [id number]    : 1960  (60 y.o.)  Gender: male           The team conference for this patient was held on 10/27/2022 at 11:00am and led by:  Erica Lama MD    CASE MANAGEMENT:  Assessment:   Patient lives at home with his girl-friend. Patient is followed in the community by the [address] [place] [address] location. Patient requires skilled ARU PT/OT/SLP in order to address deficits and promote a safe return home. Patient will need home health services and DME @ discharge. PHYSICAL THERAPY:    Bed Mobility  Supine to Sit: stand by assistance  Rolling Right: stand by assistance  Scooting: stand by assistance  Comments: Required heavy use of handrail, increased time, and required multiple trials to sit himself up and adjust his positioning. Also required verbal cues for sequencing. Transfers  Sit to stand transfer: minimal assistance  Stand to sit transfer: minimal assistance  Stand pivot transfer: minimal assistance  Comments: Transfers ranged from CGA to min A. Assist for R hand placement on seated surface to assist with push-off to standing, cues for proper feet placement. Pt performed multiple stands and 4 SPT during session. Ambulation  Surface:level surface  Assistive Device: rolling walker  Other Appliance: (R), walker splint attachment  Assistance: contact guard assistance, minimal assistance   Distance: 74 ft and 47 ft   Gait Mechanics: R knee unsteady but no significant buckling occurred. RLE ER with decreased step height and length. Trunk slightly rotated right. Decreased pelvic rotation and hip extension. Comments: Pt more aware of when brief standing rest breaks needed to correct posture or when fatigued.        Balance  Static Sitting Balance: fair (-): maintains balance at CGA with use of UE support  Dynamic Sitting Balance: fair (-): maintains balance at [place] with use of UE support  Static Standing Balance: fair (-): maintains balance at CGA with use of UE support  Dynamic Standing Balance: fair (-): maintains balance at CGA with use of UE support  Comments:   QM:  Roll Left and Right  Assistance Needed: Supervision or touching assistance  CARE Score: 4  Discharge Goal: Independent  Sit to Lying  Assistance Needed: Supervision or touching assistance  CARE Score: 4  Discharge Goal: Independent  Lying to Sitting on Side of Bed  Assistance Needed: Supervision or touching assistance  CARE Score: 4  Discharge Goal: Independent  Sit to Stand  Assistance Needed: Supervision or touching assistance  CARE Score: 4  Discharge Goal: Independent  Chair/Bed-to-Chair Transfer  Assistance Needed: Supervision or touching assistance  CARE Score: 4  Discharge Goal: Independent  Car Transfer  Assistance Needed: Partial/moderate assistance  CARE Score: 3  Discharge Goal: Independent  Walk 10 Feet  Assistance Needed: Partial/moderate assistance  CARE Score: 3  Discharge Goal: Independent  Walk 50 Feet with Two Turns  Assistance Needed: Partial/moderate assistance  CARE Score: 3  Discharge Goal: Independent  Walk 150 Feet  Reason if not Attempted: Not attempted due to medical condition or safety concerns  CARE Score: 88  Discharge Goal: Independent  Walking 10 Feet on Uneven Surfaces  Reason if not Attempted: Not attempted due to medical condition or safety concerns  CARE Score: 88  Discharge Goal: Independent  1 Step (Curb)  Assistance Needed: Partial/moderate assistance  CARE Score: 3  Discharge Goal: Supervision or touching assistance  4 Steps  Assistance Needed: Partial/moderate assistance  CARE Score: 3  Discharge Goal: Supervision or touching assistance  12 Steps  Assistance Needed: Partial/moderate assistance  CARE Score: 3  Discharge Goal: Supervision or touching assistance  Picking Up Object  Reason if not Attempted: Not attempted due to medical condition or safety concerns  CARE Score: 88  Discharge Goal: Independent  Wheelchair Ability  Uses a Wheelchair and/or Scooter?: No    Goals:                   Patient Goals: Get back to driving, get back to moving around without falling   Short Term Goals:  Time Frame: 2 weeks  Patient will complete bed mobility at modified independent   Patient will complete transfers at [facility]   Patient will ambulate 150 ft with use of LRAD at [facility]  Patient will ascend/descend 8 stairs with (B) handrail at stand by assistance  Patient will complete car transfer at [facility], modified independent               These goals were reviewed with this patient at the time of assessment and Aleshia Springer is in agreement. Plan of Care: Pt to be seen 5 out of 7 days per week per ARU protocol ( 60 minutes with PT)                     SPEECH THERAPY:    Diet Level:ADULT DIET; Regular; 4 carb choices (60 gm/meal)    Assessment: Impressions  Diagnosis: Pt continues to present with Mild  Dysarthria , Cognitive-Linguistic Deficits , and Oropharyngeal Dysphagia . Pt has improved with his overall attention and ability to complete higher level executive function tasks requiring less cueing. Goals met, increased accuracy of executive function goal. Improvement noted with speech, however, pt continues to report being 'off' from his baseline. Continue to target right oral motor strength and coordination to facilitate speech precision in order for return to baseline level as subjectively rated by pt, pt's family and SLP. Continue to target higher level cognitive tasks for safe return to prior level of independence. Goals:                         Short Term Goals  Time Frame for Short Term Goals: 2 weeks  Goal 1: Pt will tolerate recommended diet and advanced trials with no overt s/s of aspiration.  Progressing, continue  Goal 2: Pt will improve oral motor strength and ROM for coordinated and alternating motions, via graded tasks to improve speech back to baseline level as subjectively rated by SLP/pt and family. Progressing, continue  Goal 3: Pt will improve executive function for multifactor task completion via graded tasks to 80% accuracy. Goal Met (86% acc across tasks)  Goal 4: Pt will improve attention to detail for graded complex information to 80%, for improved recall and retention of newly learned information. Goal Met (91% acc across tasks)  Goal 5: Pt will improve executive function for multifactor task completion via graded tasks to 90% accuracy.  (Addend previous goal to increase accuracy)              Time Frame for Short Term Goals: 2 weeks    Plan of Care:  Pt to be seen 5 out of 7 days per week per ARU protocol (60 minutes with SLP)    OCCUPATIONAL THERAPY:    ADL:    Upper Extremity Bathing: stand by assistance requires verbal cueing  Lower Extremity Bathing: minimal assistance requires verbal cueing   Bathing Equipment: long handled sponge  Bathing Comments: completed seated on TTB in shower- VC for meli-techniques and problem solving with use of LHS for increased independence  Upper Extremity Dressing: moderate assistance  Lower Extremity Dressing: moderate assistance  Dressing Equipment: reacher  Dressing Comments: use of reacher to doff LB clothing w/ increased time and VC; pt requires assist to don socks; Baron for threading LEs into clothing w/ reacher; Baron for clothing over R hip in stance; pt required assist to thread B UE and to adjust shirt down trunk     Toilet Transfers: CGA with use of RW, grab bar    Tub/ShowerTransfers: CGA, RW to/from TTB, grab bar     QM:  Eating  Assistance Needed: Setup or clean-up assistance  CARE Score: 5  Discharge Goal: Independent  Oral Hygiene  Assistance Needed: Setup or clean-up assistance  CARE Score: 5  [facility]al[facility]: Partial/moderate assistance  CARE Score: 3  Discharge Goal: Independent  Toilet Transfer  Assistance needed: Partial/moderate assistance  CARE Score: 3  Discharge Goal: Independent  Shower/Bathe Self  Assistance Needed: Partial/moderate assistance  CARE Score: 3  Discharge Goal: Independent  Upper Body Dressing  Assistance Needed: Partial/moderate assistance  CARE Score: 3  Discharge Goal: Independent  Lower Body Dressing  Assistance Needed: Partial/moderate assistance  CARE Score: 3  Discharge Goal: Independent  Putting On/Taking Off Footwear  Assistance Needed: Substantial/maximal assistance  CARE Score: 2  Discharge Goal: Independent    Goals:               :Short Term Goals:  Time Frame: 10-14 days  Patient will complete upper body ADL at supervision   Patient will complete lower body ADL at minimal assistance   Patient will complete toileting at minimal assistance   Patient will complete self-feeding at modified independent   Patient will complete grooming at modified independent   Patient will complete functional transfers at supervision      Long Term Goals:  Time Frame: 2-3 weeks   Patient will complete upper body ADL at modified independent   Patient will complete lower body ADL at modified independent   Patient will complete toileting at modified independent   Patient will complete functional transfers at modified independent   Patient will complete functional mobility at modified independent   Patient to gather and transport IADL items at supervision    : These goals were reviewed with this patient at the time of assessment and Lanre Loaiza is in agreement    Plan of Care:  Pt to be seen 5 out of 7 days per week per ARU protocol ( 60 minutes with OT)     NUTRITION:  Weight: 278 lb 4.8 oz (126.2 kg) / Body mass index is 39.93 kg/m². Diet Order:CCC    Supplements:NA    Po intake % of meals. Offered [facility] diet educ. Written info requested/accepted. RD available for further education needs upon request. Please see nutrition note for details.     NURSING:    Risk for Readmission: 12%  Fox Fall Risk Score: 70  Wounds/Incisions/Ulcers: N/A  Medication Review: reviewed with patient daily  Pain: managed with eMAR and repositioning  Consultations/Labs/X-rays:    Labs: CBC, Basic metabolic panel Q MO, TH    Patient/Family Education provided by team:    Discharge Plan   Estimated Length of Stay: 5 days   Destination: home health  Pass:No  Services at Discharge: [facility] OT, PT S3  Equipment at Discharge: TTB, w/c, RW, grab bars in shower/near commode, ramp   Factors facilitating achievement of predicted outcomes: Family support, Cooperative, and Sense of humor  Barriers to the achievement of predicted outcomes: Limited insight into deficits and Long standing deficits    Patient Goals:  Get back to driving, get back to moving around without falling, regain IND with mobility      Interdisciplinary Individualized Plan of Care Review of Previous Week:    Medical and functional progress towards goals:  Ongoing adjustments being made to medication to control blood sugar. Everything else medically stable. Progressing in all therapy disciplines including decreased assistance required for transfers, ambulation, and ADL completion. Ongoing focus on improving strength and stability with functional mobility. Plan to focus on both RW mobility for when supervised and w/c mobility for when alone in home. OT to focus on further progressing independence with dressing/transfers to TTWB and (R) UE strengthening. Progressing with attention and memory in [address] [place] [address] Dr, continuing to work on improving strength in facial muscles.   Barriers towards progress:  Right sided weakness and coordination  Interventions to address Barriers:  Ongoing PT/OT to improve strength/function  Goals still appropriate:  Yes  Modifications to goals: None  Continue Current Plan of Care:  Yes  Modifications to Plan of Care: None    Rehab Team Members in attendance for Team Conference:  KAMI Erickson, [address] [place] [address], BRITTNI, JON Lobato OTR/CHASE Vazquez Jaxon Caceres, AMANDA Key M.A., CCC-SLP    Rosangela Lebron BSN, RN, [facility]    I approve the established interdisciplinary plan of care as documented within the medical record of Kyree Vásquez.     Danielle Mc MD   Electronically signed by Danielle Mc MD on 10/27/2022 at 11:15 AM

## 2022-10-26 NOTE — PLAN OF CARE
Problem: ABCDS Injury Assessment  Goal: Absence of physical injury  Outcome: Progressing  Flowsheets (Taken 10/26/2022 0752)  Absence of Physical Injury: Implement safety measures based on patient assessment     Problem: Chronic Conditions and Co-morbidities  Goal: Patient's chronic conditions and co-morbidity symptoms are monitored and maintained or improved  10/26/2022 0752 by Jin Lincoln RN  Outcome: Progressing  Flowsheets (Taken 10/26/2022 0752)  Care Plan - Patient's Chronic Conditions and Co-Morbidity Symptoms are Monitored and Maintained or Improved:   Monitor and assess patient's chronic conditions and comorbid symptoms for stability, deterioration, or improvement   Collaborate with multidisciplinary team to address chronic and comorbid conditions and prevent exacerbation or deterioration   Update acute care plan with appropriate goals if chronic or comorbid symptoms are exacerbated and prevent overall improvement and discharge  10/25/2022 1855 by Jin Lincoln RN  Outcome: Progressing  Flowsheets (Taken 10/24/2022 0930 by Tulio Garcia RN)  Care Plan - Patient's Chronic Conditions and Co-Morbidity Symptoms are Monitored and Maintained or Improved: Monitor and assess patient's chronic conditions and comorbid symptoms for stability, deterioration, or improvement

## 2022-10-26 NOTE — PROGRESS NOTES
Darrian Loaiza  10/26/2022  [id number]    Chief Complaint: Acute embolic stroke ([address]    Subjective:   No overnight events. No current complaints. Declined full dose lantus last night. States he's cleaned up his eating and isn't drinking 2 pops with each meal. Glucoses appropriate. ROS: No CP, SOB, dyspnea    Objective:  Patient Vitals for the past 24 hrs:   BP Temp Temp src Pulse Resp SpO2   10/25/22 1945 (!) 112/55 98.1 °F (36.7 °C) Oral 61 16 93 %       Gen: No distress, pleasant. Resting on therapy bike  HEENT: Normocephalic, atraumatic  CV: Regular rate and rhythm. No MRG   Resp: No respiratory distress. CTAB   Abd: Soft, nontender nondistended  Ext: No edema   Neuro: Alert, oriented, appropriately interactive. Laboratory data: Available via EMR. Therapy progress:    PT    Supine to Sit: Partial/moderate assistance  Sit to Supine:     Sit to Stand: Partial/moderate assistance  Chair/Bed to Chair Transfer: Partial/moderate assistance  Car Transfer: Partial/moderate assistance  Ambulation 10 ft: Partial/moderate assistance  Ambulation 50 ft: Partial/moderate assistance  Ambulation 150 ft:    Stairs - 1 Step: Partial/moderate assistance  Stairs - 4 Step: Partial/moderate assistance  Stairs - 12 Step: Partial/moderate assistance    OT    Eating:    Oral Hygiene: Setup or clean-up assistance  Bathing: Partial/moderate assistance  Upper Body Dressing: Partial/moderate assistance  Lower Body Dressing: Partial/moderate assistance  Toilet Transfer: Partial/moderate assistance  Toilet Hygiene: Partial/moderate assistance    Speech Therapy    Pt continues to present with Mild  Dysarthria , Cognitive-Linguistic Deficits , and Oropharyngeal Dysphagia . Pt is demonstrating progress with insight into deficits, self correction of errors, and sustained attention to task. He is progressing with use of NMES and oropharyngeal strengthening.  Continue to target right oral motor strength and coordination to facilitate speech precision, safe diet tolerance; and cognitive tasks for safe return to prior level of independence. Body mass index is 39.93 kg/m². Assessment:  Patient Active Problem List   Diagnosis    HTN (hypertension)    DM2 (diabetes mellitus, type 2) ([facility]    High cholesterol    Acute embolic stroke ([facility]       Plan:   Left cerebral hemisphere thromboembolic infarcts: Eliquis, statin. PT/OT/SLP. Started lexapro     Nonrheumatic aortic valve stenosis: S/p TAVR on 10/11. Eliquis     DM: A1c 8.6, Lantus 40 -> 20 (80), prandial 10 -> 5 (20), and SSI     DM neuropathy: no current meds     CAD: eliquis, statin     HTN: atenolol 25, lisinopril 5, HCTZ 12.5     HLD: lipitor 40     Hypokalemia: supplement     Bowels: Per protocol  Bladder: Per protocol   Sleep: Trazodone provided prn. Pain: tylenol, tramadol, robaxin PRN   DVT PPx: Eliquis   KAYE: 11/1    Constantine Shone, MD 10/26/2022, 8:21 AM    * This document was created using dictation software. While all precautions were taken to ensure accuracy, errors may have occurred. Please disregard any typographical errors.

## 2022-10-26 NOTE — PROGRESS NOTES
[facility] Department   Phone: [phone]    Speech Therapy    [] Initial Evaluation            [x] Daily Treatment Note         [] Discharge Summary      Patient: Aleshia Springer   : 1960   MRN: [id number]   Date of Service:  10/26/2022  Admitting Diagnosis: Acute embolic stroke [facility])  Current Admission Summary: 65-year-old male with a history of diabetes, MI, HTN, HLD, diabetic neuropathy, and nonrheumatic aortic valve stenosis who was admitted to  on 10/11 for a scheduled TAVR procedure. The procedure was complicated by an episode of V. fib and postoperatively he was noted to have neurologic deficits and work-up revealed a thromboembolic stroke involving the left cerebral territories. Work-up including TTE was unremarkable for PFO. He was initiated on Eliquis for prophylaxis. Patient requires significant insulin at baseline with his normal home regimen of 80 units of Lantus with 20 units Humalog 3 times daily. A1c was 8.6. Due to decreased p.o. intake his regimen was adjusted. He was evaluated by therapy and suggested to continue in an inpatient setting prior to returning home. He reports he is frustrated by his situation but motivated to recover. Past Medical History:  has a past medical history of Carbuncle, DM2 (diabetes mellitus, type 2) ([address] ED (erectile dysfunction), History of heart attack, HTN (hypertension), Hyperlipidemia, Peripheral neuropathy, Sleep apnea, and Wears glasses. Past Surgical History:  has a past surgical history that includes Cardiac catheterization (); Pilonidal cyst excision; cyst removal; cardiovascular stress test (2012); cardiovascular stress test (2019); back surgery; Colonoscopy (2021); and Colonoscopy (N/A, 2021).   Precautions/Restrictions: high fall risk, up as tolerated  Instrumental Swallow Study: FEES at outside hospital completed on 10/12/2022      Pre-Admission Information   Living Status: Pt lives with his significant other Mariela Mercado, a dog and cat. 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 Motivated to participate in therapy with good awareness of deficits   Pain: Denied pain   Safety Interventions: patient left in chair and chair alarm in place       Therapeutic Interventions:     Session 1:   Dysphagia: Severity: Mild   Patient tolerated NMES via VitalStim placement 4a (targeting the orpharyngeal \"sling\", intrinsic/extrinsic muscles of the tongue and pharyngeal constrictor) @ R 14.0 mA and L 3.5 mA for 21 minutes (reported sensation even on R/L sides). Slight contraction noted under eye/ no contraction noted to R upper labial surface. Utilized in combination with oral motor exercises and motor speech tasks (see below).       Motor Speech: Severity: Mild  and Moderate   Oral motor exercises (OME) to target R labial strength and ROM (utilized mirror to promote facial symmetry with tasks)   -Labial retraction/protrusion x 10   -Jaw drop/ open mouth pucker x 10   -unilateral alternating cheek puffs x 10   -alternating e/o x 10   -buccal strengthening (fish face) x 10   -jaw opening against resistance x 10   -pt education provided re: rationale for OME     Motor speech exercises   - Oral reading (single paragraph); pt self rated his speech as \"sounds like I'm getting lazy\", he appreciated improvements in his speech but stated it is still not back to normal   - Comparison to recording from last week; subjectively appreciated improved rate and intensity     Cognition: Severity: Mild   Executive functioning (grocery store ad)  -pt answered questions about grocery ad with 73% (11/15) accuracy    Executive functioning (errand planning)   -pt identified arrival time for errands with 100% accuracy (5/5), pt correctly calculated number of minutes based on distance and arrival time  -utilizing a visual (map) pt accurately found the distance between two location with 100% accuracy (5/5)   -pt independently planned time efficient route to complete 5 errands   -pt independently used the cognitive strategy to preview the activity (looked at all errands before starting) and breaking down the task into more manageable portions       Additional Interventions:      Education  Barriers To Learning: none  Patient Education: Provided education regarding role of SLP, rationale for treatment tasks and general speech pathology plan of care. Learning Assessment: Pt verbalized understanding     Assessment  Impairments Requiring Therapeutic Intervention: Dysarthria , Cognitive-Linguistic Deficits , and Oropharyngeal Dysphagia   Prognosis: good    Clinical Assessment:  Pt continues to present with Mild  Dysarthria , Cognitive-Linguistic Deficits , and Oropharyngeal Dysphagia . Pt is demonstrating progress with sustained attention to task and tolerance of a regular diet. He requires min cues for more complex executive function tasks. Continue to target right oral motor strength and coordination to facilitate speech precision, safe diet tolerance; and cognitive tasks for safe return to prior level of independence. Diet Solids Recommendation:   Liquid Consistency Recommendation:   Recommended Form of Meds:   Regular texture diet     Thin liquids     Meds whole with water       Recommended Compensatory Swallowing Strategies: Upright as possible with all PO intake , No straws , Small bites/sips , Eat/feed slowly, Remain upright 30-45 min       Plan  Frequency: 60 minutes/day; 5 days per week, as tolerated, until goals met, or discharged from ARU.   Therapeutic Interventions: Diet Tolerance Monitoring Speech / Motor Planning / Voice intervention  and Cognitive-Linguistic intervention   Discharge Recommendations: Home independently  Continued SLP at Discharge: TBD based upon progress     Goals  Patient Goals: \"improve my speech\"   Time Frame: 2 weeks     Pt will tolerate recommended diet and advanced trials with no overt s/s of aspiration. Ongoing  Pt will improve oral motor strength and ROM for coordinated and alternating motions, via graded tasks to improve speech back to baseline level as subjectively rated by SLP/pt and family. Ongoing   Pt will improve executive function for multifactor task completion via graded tasks to 80% accuracy. Ongoing   Pt will improve attention to detail for graded complex information to 80%, for improved recall and retention of newly learned information. Ongoing       Therapy Session Time      Session 1   Time In 0855   Time Out 1000   Time Code Minutes 35   Individual Minutes 65     Timed Code Treatment Minutes: 45  Total Treatment Minutes:  72    Electronically Signed By:  Souleymane Barnhart M.A. CCC-SLP GIL [id number]  Speech-Language Pathologist   10/26/2022 10:08 AM     Aletha SARAH,  Speech-Language Pathology     The speech-language pathologist was present, directed the patient's care, made skilled judgment and was responsible for assessment and treatment.

## 2022-10-26 NOTE — CARE COORDINATION
Patient is a  and is followed by the [facility]. SW contacted [facility] to inquire about patient's PCP and RN. Patient is seen @ the [place], [place] location. Patient's PCP is Ethan Esqueda and Patient's RN is Joesph Babin ([phone] Extension [phone] fax). SW will follow.     Electronically signed by KAMI Ladd on 10/26/2022 at 2:14 PM

## 2022-10-27 LAB
ANION GAP SERPL CALCULATED.3IONS-SCNC: 10 MMOL/L (ref 3–16)
BUN BLDV-MCNC: 26 MG/DL (ref 7–20)
CALCIUM SERPL-MCNC: 9.2 MG/DL (ref 8.3–10.6)
CHLORIDE BLD-SCNC: 105 MMOL/L (ref 99–110)
CO2: 23 MMOL/L (ref 21–32)
CREAT SERPL-MCNC: 1 MG/DL (ref 0.8–1.3)
GFR SERPL CREATININE-BSD FRML MDRD: >60 ML/MIN/{1.73_M2}
GLUCOSE BLD-MCNC: 155 MG/DL (ref 70–99)
GLUCOSE BLD-MCNC: 182 MG/DL (ref 70–99)
GLUCOSE BLD-MCNC: 206 MG/DL (ref 70–99)
GLUCOSE BLD-MCNC: 209 MG/DL (ref 70–99)
GLUCOSE BLD-MCNC: 277 MG/DL (ref 70–99)
HCT VFR BLD CALC: 34.8 % (ref 40.5–52.5)
HEMOGLOBIN: 11.4 G/DL (ref 13.5–17.5)
MCH RBC QN AUTO: 26.1 PG (ref 26–34)
MCHC RBC AUTO-ENTMCNC: 32.9 G/DL (ref 31–36)
MCV RBC AUTO: 79.3 FL (ref 80–100)
PDW BLD-RTO: 14.2 % (ref 12.4–15.4)
PERFORMED ON: ABNORMAL
PLATELET # BLD: 108 K/UL (ref 135–450)
PMV BLD AUTO: 8.9 FL (ref 5–10.5)
POTASSIUM SERPL-SCNC: 4.3 MMOL/L (ref 3.5–5.1)
RBC # BLD: 4.39 M/UL (ref 4.2–5.9)
SODIUM BLD-SCNC: 138 MMOL/L (ref 136–145)
WBC # BLD: 7 K/UL (ref 4–11)

## 2022-10-27 PROCEDURE — 6370000000 HC RX 637 (ALT 250 FOR IP): Performed by: PHYSICAL MEDICINE & REHABILITATION

## 2022-10-27 PROCEDURE — 97535 SELF CARE MNGMENT TRAINING: CPT

## 2022-10-27 PROCEDURE — 1280000000 HC REHAB R&B

## 2022-10-27 PROCEDURE — 97112 NEUROMUSCULAR REEDUCATION: CPT

## 2022-10-27 PROCEDURE — 85027 COMPLETE CBC AUTOMATED: CPT

## 2022-10-27 PROCEDURE — 97530 THERAPEUTIC ACTIVITIES: CPT

## 2022-10-27 PROCEDURE — 36415 COLL VENOUS BLD VENIPUNCTURE: CPT

## 2022-10-27 PROCEDURE — 92507 TX SP LANG VOICE COMM INDIV: CPT

## 2022-10-27 PROCEDURE — 97116 GAIT TRAINING THERAPY: CPT

## 2022-10-27 PROCEDURE — 80048 BASIC METABOLIC PNL TOTAL CA: CPT

## 2022-10-27 PROCEDURE — 97130 THER IVNTJ EA ADDL 15 MIN: CPT

## 2022-10-27 PROCEDURE — 97129 THER IVNTJ 1ST 15 MIN: CPT

## 2022-10-27 PROCEDURE — 97110 THERAPEUTIC EXERCISES: CPT

## 2022-10-27 RX ORDER — INSULIN GLARGINE 100 [IU]/ML
25 INJECTION, SOLUTION SUBCUTANEOUS NIGHTLY
Status: DISCONTINUED | OUTPATIENT
Start: 2022-10-27 | End: 2022-10-28

## 2022-10-27 RX ADMIN — FUROSEMIDE 20 MG: 20 TABLET ORAL at 08:56

## 2022-10-27 RX ADMIN — ESCITALOPRAM OXALATE 5 MG: 10 TABLET ORAL at 08:56

## 2022-10-27 RX ADMIN — CYANOCOBALAMIN TAB 1000 MCG 500 MCG: 1000 TAB at 08:55

## 2022-10-27 RX ADMIN — POTASSIUM CHLORIDE 20 MEQ: 1500 TABLET, EXTENDED RELEASE ORAL at 08:56

## 2022-10-27 RX ADMIN — CAPSAICIN: 0.25 CREAM TOPICAL at 20:21

## 2022-10-27 RX ADMIN — ATORVASTATIN CALCIUM 40 MG: 40 TABLET, FILM COATED ORAL at 20:25

## 2022-10-27 RX ADMIN — INSULIN LISPRO 4 UNITS: 100 INJECTION, SOLUTION INTRAVENOUS; SUBCUTANEOUS at 12:12

## 2022-10-27 RX ADMIN — APIXABAN 5 MG: 5 TABLET, FILM COATED ORAL at 08:55

## 2022-10-27 RX ADMIN — INSULIN GLARGINE 25 UNITS: 100 INJECTION, SOLUTION SUBCUTANEOUS at 20:27

## 2022-10-27 RX ADMIN — INSULIN LISPRO 5 UNITS: 100 INJECTION, SOLUTION INTRAVENOUS; SUBCUTANEOUS at 08:48

## 2022-10-27 RX ADMIN — ATENOLOL 25 MG: 50 TABLET ORAL at 08:56

## 2022-10-27 RX ADMIN — HYDROCHLOROTHIAZIDE 12.5 MG: 25 TABLET ORAL at 08:55

## 2022-10-27 RX ADMIN — APIXABAN 5 MG: 5 TABLET, FILM COATED ORAL at 20:25

## 2022-10-27 RX ADMIN — INSULIN LISPRO 5 UNITS: 100 INJECTION, SOLUTION INTRAVENOUS; SUBCUTANEOUS at 12:12

## 2022-10-27 RX ADMIN — INSULIN LISPRO 2 UNITS: 100 INJECTION, SOLUTION INTRAVENOUS; SUBCUTANEOUS at 08:48

## 2022-10-27 RX ADMIN — CAPSAICIN: 0.25 CREAM TOPICAL at 08:56

## 2022-10-27 RX ADMIN — INSULIN LISPRO 5 UNITS: 100 INJECTION, SOLUTION INTRAVENOUS; SUBCUTANEOUS at 17:44

## 2022-10-27 RX ADMIN — TRAZODONE HYDROCHLORIDE 50 MG: 50 TABLET ORAL at 20:26

## 2022-10-27 RX ADMIN — LISINOPRIL 5 MG: 5 TABLET ORAL at 08:56

## 2022-10-27 ASSESSMENT — PAIN SCALES - GENERAL
PAINLEVEL_OUTOF10: 0
PAINLEVEL_OUTOF10: 2
PAINLEVEL_OUTOF10: 3
PAINLEVEL_OUTOF10: 1

## 2022-10-27 ASSESSMENT — PAIN DESCRIPTION - ONSET
ONSET: ON-GOING
ONSET: ON-GOING

## 2022-10-27 ASSESSMENT — PAIN DESCRIPTION - DESCRIPTORS
DESCRIPTORS: ACHING;DISCOMFORT
DESCRIPTORS: DISCOMFORT

## 2022-10-27 ASSESSMENT — PAIN DESCRIPTION - PAIN TYPE
TYPE: CHRONIC PAIN
TYPE: CHRONIC PAIN

## 2022-10-27 ASSESSMENT — PAIN DESCRIPTION - FREQUENCY
FREQUENCY: CONTINUOUS
FREQUENCY: INTERMITTENT

## 2022-10-27 ASSESSMENT — PAIN DESCRIPTION - LOCATION
LOCATION: BACK;SHOULDER
LOCATION: SHOULDER

## 2022-10-27 ASSESSMENT — PAIN DESCRIPTION - ORIENTATION
ORIENTATION: RIGHT
ORIENTATION: RIGHT;LOWER

## 2022-10-27 NOTE — PROGRESS NOTES
[facility] Department   Phone: [phone]    Occupational Therapy    [] Initial Evaluation            [x] Daily Treatment Note         [] Discharge Summary      Patient: Esther Abarca   : 1960   MRN: [id number]   Date of Service:  10/27/2022    Admitting Diagnosis:  Acute embolic stroke [facility])  Current Admission Summary: 79-year-old male with a history of diabetes, MI, HTN, HLD, diabetic neuropathy, and nonrheumatic aortic valve stenosis who was admitted to  on 10/11 for a scheduled TAVR procedure. The procedure was complicated by an episode of V. fib and postoperatively he was noted to have neurologic deficits and work-up revealed a thromboembolic stroke involving the left cerebral territories. Work-up including TTE was unremarkable for PFO. He was initiated on Eliquis for prophylaxis. Patient requires significant insulin at baseline with his normal home regimen of 80 units of Lantus with 20 units Humalog 3 times daily. A1c was 8.6. Due to decreased p.o. intake his regimen was adjusted. He was evaluated by therapy and suggested to continue in an inpatient setting prior to returning home  Past Medical History:  has a past medical history of Carbuncle, DM2 (diabetes mellitus, type 2) ([address] ED (erectile dysfunction), History of heart attack, HTN (hypertension), Hyperlipidemia, Peripheral neuropathy, Sleep apnea, and Wears glasses. Past Surgical History:  has a past surgical history that includes Cardiac catheterization (); Pilonidal cyst excision; cyst removal; cardiovascular stress test (2012); cardiovascular stress test (2019); back surgery; Colonoscopy (2021); and Colonoscopy (N/A, 2021).     Discharge Recommendations: HH OT S3    DME Required For Discharge: TTB with cut out, grab bars near commode and in shower, w/c, RW, reacher, hand held shower head    Precautions/Restrictions: high fall risk, up as tolerated  Weight Bearing Restrictions: no restrictions    Required Braces/Orthotics: no braces required  Positional Restrictions:no positional restrictions    Pre-Admission Information   Lives With: spouse                  Type of Home: house  Home Layout: one level  Home Access:  5 step to enter without rails   Bathroom Layout: walker accessible, wheelchair accessible, tub/shower only  Bathroom Equipment: hand held shower head  Toilet Height: standard height- low  Home Equipment: single point cane, manual wheelchair  Transfer Assistance: Independent without use of device  Ambulation Assistance:Independent without use of device  ADL Assistance: independent with all ADL's with exception of assist to don socks- pt is IND w/ shoes pending shoe style   IADL Assistance: requires assistance with most homemaking tasks; pt IND to mow lawn on riding mower; IND with medications and light meal prep (microwave use/snacks)  Active :        [x] Yes                 [] No  Hand Dominance: [] Left                 [x] Right  Current Employment: retired. Occupation: Engineering  Hobbies: Drive, fish, working on the car, yard work  [employer]: once a week in yard, has slipped a few times in shower        Subjective:   General: Pt in recliner at entry, agreeable to OT session, declined ADL needs   Pain Interventions: not applicable        Activities of Daily Living  Basic Activities of Daily Living  General Comments: declined all needs, asking to shower tomorrow   Instrumental Activities of Daily Living  No IADL completed on this date. Functional Mobility  Bed Mobility  Bed mobility not completed on this date.   Comments:  Transfers  Sit to stand transfer:contact guard assistance  Stand to sit transfer: contact guard assistance  Tub transfer: contact guard assistance  Tub transfer equipment: tub transfer bench, walker  Tub transfer comments: dry tub/shower transfer to simulate home layout and recommended method- VC for problem solving hand placement to/from bench- pt able to scoot along bench and clear LEs at SBA, CGA for sit<>stands   Comments:     Functional Mobility:  Sitting Balance: supervision, stand by assistance. Standing Balance: contact guard assistance. Functional Mobility: .  contact guard assistance  Functional Mobility Activity: ~10ft in room + 15ft within and from ADL suite to/from w/c   Functional Mobility Device Use: rolling walker  Functional Mobility Comment: use of RW w/ (R) splint for mobility, cues to slow pace during turns; R LE slight buckling ~4x during mobility but pt able to correct balance w/o physical assist     Additional Activity:   Table top gross/FMC of R UE activity to increase functional grasp and strength: using built up  large spoon- pt transferred large beads from large bowl >> adjacent bowl- Baron for increased wrist extension and intermittent assist to digits for increased , 1 rest break required        Second Session:  Pt in chair at entry, agreeable to session, denied pain. Requesting to use commode. CGA for SPT to/from commode w/ grab bar. Toileting completed with ModA (assist for thorough roberto hygiene after pt completed + to adjust clothing in back on R side)- hand hygiene w/c level at sink w/ set-up. LiteGait Harness used for functional mobility training on treadmill for neuro recovery: Harness donned in // bars, use of LiteGait and therapist support to step on/off treadmill- 4 reps completed - pt ambulates 1min 25sec at 1. 1mph + 1min 7 sec at 1. 2mph + 50sec at 1.2 mph + 1min 23sec at 1. 2mph- pt required assist from PT for R LE w/ dorsi-flex while OT facilitated and assist with weight shifting, peliv tension band applied to R for 2/4 reps. Pt tolerated well, rest breaks as needed, reporting L UE/LE fatigue. SPT w/c>EOB at [facility]. Sit>supine at SBA. Pt left in bed, bed alarm on, call light and needs in reach.             Cognition  Overall Cognitive Status: WFL  Arousal/Alterness: appropriate responses to stimuli  Following Commands: follows all commands without difficulty  Attention Span: attends with cues to redirect  Safety Judgement: decreased awareness of need for assistance, decreased awareness of need for safety  Problem Solving: assistance required to implement solutions  Insights: fully aware of deficits  Initiation: requires cues for some  Sequencing: requires cues for some  Comment: cog appears overall WFL based on functional tasks completed; dysarthric speech  Orientation:    alert and oriented x 4  Command Following:   [facility]     Education  Barriers To Learning: none  Patient Education: patient educated on goals, OT role and benefits, plan of care, ADL adaptive strategies, proper use of assistive device/equipment, adaptive device training, transfer training, discharge recommendations, AROM of involved R UE  Learning Assessment:  patient verbalizes understanding, would benefit from continued reinforcement    Assessment  Activity Tolerance: tolerated well, requires rest breaks through activities. Impairments Requiring Therapeutic Intervention: decreased functional mobility, decreased ADL status, decreased ROM, decreased strength, decreased safety awareness, decreased endurance, decreased balance, decreased IADL, decreased fine motor control, decreased coordination  Prognosis: good  Clinical Assessment: Pt is a 60 yr old male who presents post CVA w/ primarily R side and speech/language deficits. Pt is below baseline level of function d/t above deficits. Pt is progressing well- continues to required Min-CGA for mobility/transfers. Pt completed gross/fine motor coordination activity for R UE for additional neuro recovery. Pt and family training/education on TTB method and use w/ pt trial 1x during session. Pt easily fatigued w/ functional activity- participates well within sessions, continues to demo motivated for therapy.   Pt continues to benefit from intensive inpt therapy to maximize functional independence and safety. Continue with POC.   Safety Interventions: patient left in chair, chair alarm in place, call light within reach, gait belt, patient at risk for falls, and family/caregiver present    Plan  Frequency: 5 x/week, 60 min/day  Current Treatment Recommendations: strengthening, ROM, balance training, functional mobility training, transfer training, endurance training, neuromuscular re-education, ADL/self-care training, IADL training, home management training, safety education, equipment evaluation/education, and positioning    Goals  Patient Goals: \"get back to driving\", regain IND with mobility    Short Term Goals:  Time Frame: 10-14 days  Patient will complete upper body ADL at supervision   Patient will complete lower body ADL at minimal assistance   Patient will complete toileting at minimal assistance   Patient will complete self-feeding at modified independent   Patient will complete grooming at modified independent   Patient will complete functional transfers at supervision     Long Term Goals:  Time Frame: 2-3 weeks   Patient will complete upper body ADL at modified independent   Patient will complete lower body ADL at modified independent   Patient will complete toileting at modified independent   Patient will complete functional transfers at modified independent   Patient will complete functional mobility at modified independent   Patient to gather and transport IADL items at supervision      -pt progressing towards goals  10/27    Therapy Session Time     Individual Group Co-treatment   Time In 1033      Time Out 1103      Minutes 30         Second Session Therapy Time:   Individual Concurrent Group Co-treatment   Time In      1330   Time Out      1415   Minutes      45   Timed Code Treatment Minutes: 30+45  Total Treatment Minutes:  75       Electronically Signed By: Alison Dial OTR/L #[id number]

## 2022-10-27 NOTE — CARE COORDINATION
Patient's patient contact/girlfriend contacted this SW. Girlfriend inquiring about home care provider and DME for patient. MILO informed patient's girlfriend that orders for home care and DME had been sent to the Patient's PCP Adriane Nielsen/Adrien at [facility]. MILO also instructed patient's girlfriend to contact [place] if she had additional questions. MILO will follow.     Electronically signed by KAMI Culver on 10/27/2022 at 3:02 PM

## 2022-10-27 NOTE — PROGRESS NOTES
[facility] Department   Phone: [phone]    Speech Therapy    [] Initial Evaluation            [x] Daily Treatment Note         [] Discharge Summary      Patient: Grady Jones   : 1960   MRN: [id number]   Date of Service:  10/27/2022  Admitting Diagnosis: Acute embolic stroke [facility])  Current Admission Summary: 71-year-old male with a history of diabetes, MI, HTN, HLD, diabetic neuropathy, and nonrheumatic aortic valve stenosis who was admitted to  on 10/11 for a scheduled TAVR procedure. The procedure was complicated by an episode of V. fib and postoperatively he was noted to have neurologic deficits and work-up revealed a thromboembolic stroke involving the left cerebral territories. Work-up including TTE was unremarkable for PFO. He was initiated on Eliquis for prophylaxis. Patient requires significant insulin at baseline with his normal home regimen of 80 units of Lantus with 20 units Humalog 3 times daily. A1c was 8.6. Due to decreased p.o. intake his regimen was adjusted. He was evaluated by therapy and suggested to continue in an inpatient setting prior to returning home. He reports he is frustrated by his situation but motivated to recover. Past Medical History:  has a past medical history of Carbuncle, DM2 (diabetes mellitus, type 2) ([address] ED (erectile dysfunction), History of heart attack, HTN (hypertension), Hyperlipidemia, Peripheral neuropathy, Sleep apnea, and Wears glasses. Past Surgical History:  has a past surgical history that includes Cardiac catheterization (); Pilonidal cyst excision; cyst removal; cardiovascular stress test (2012); cardiovascular stress test (2019); back surgery; Colonoscopy (2021); and Colonoscopy (N/A, 2021).   Precautions/Restrictions: high fall risk, up as tolerated  Instrumental Swallow Study: FEES at outside hospital completed on 10/12/2022      Pre-Admission Information   Living Status: Pt lives with his significant other Ana Gallegos, a dog and cat. 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 Motivated to participate in therapy with good awareness of deficits. Pain: Denied pain   Safety Interventions: patient left in chair and chair alarm in place with significant other present       Therapeutic Interventions:     Session 1:   Dysphagia: Severity: Mild   Patient tolerated NMES via VitalStim placement 4a (targeting the orpharyngeal \"sling\", intrinsic/extrinsic muscles of the tongue and pharyngeal constrictor) @ R 8.0 mA and L 3.5 mA for 21 minutes (reported sensation even on R/L sides). Slight contraction noted under eye/ no contraction noted to R upper labial surface. Utilized in combination with oral motor exercises and motor speech tasks (see below). Motor Speech: Severity: Mild  and Moderate   Oral motor exercises (OME) to target R labial strength and ROM (utilized mirror to promote facial symmetry with tasks)   -Labial retraction/protrusion x 10   -Jaw drop/ open mouth pucker x 10   -unilateral alternating cheek puffs x 10   -bilateral cheek puffs against resistance x 10    -buccal strengthening (fish face) x 10   -jaw opening against resistance x 10   -lip smacks x 10  -pt education provided re: rationale for OME and VitalStim      Motor speech exercises  -A/E/I/O/U repetition x 5 with a slow rate and focus on over exaggerating movements   - A/E/I/O/U repetition x 5 with an increased rate and focus on clear articulation   Oral reading   -multi-syllabic words x 20, pt with increased difficulty with /sh/ and s-blends (/st/,/sl/, /sk/).  Pt self corrected productions x 3.   -pt read sentences with repetitive sounds and multi-syllabic words x 10, reading recorded but due to time constraints will be reviewed w/ pt at a later time  -as oral reading tasks progressed, pt with increased errors, though still intelligible, and pt reported fatigue, \"my brains getting tired\"   -discussed with pt and pt's girlfriend current speech intelligibility compared to baseline. Pt stated that he notices decreased intelligibility with fatigue and extended talking. Pt voiced concerns about previous cognitive deficits and impact on daily function (stating he forgot familiar words more and had a hard time coming up with things he should be familiar with. Per pt's significant other MD stated pt could have had some TIAs impacting word finding. SLP provided education re: patients current progress and improvements, rationale for seeking more in depth neuro-cog testing (if pt and pt's spouse have continued concerns and/or cognitive deficits worsen further impacting daily life and functioning)         Cognition: Severity: Mild   Executive functioning: medication management   -pt correctly identified the fill date and dosage of prescriptions in visual stimuli with 100% (5/5) accuracy, pt with good attention to detail noting all bottles were filled on the same date   -pt calculated the number of tablets remaining with 60% (3/5) accuracy  -pt determined the refill date for the prescription with 100% accuracy   -pt with good working memory, requiring no repetitions of dates or instructions during task     Working memory/immediate via word association task   -pt completed working memory activity alternating turns to create and recall a newly generated list of items from a specific category (grocery items) beginning with a specific letter (A-W, 20 items)  -pt required 8 min semantic cues for forgotten items throughout task.  Decreased recall of items created by SLP grad student   -pt required 1 initiation cue to create new items for the list   -pt able to state appropriate item beginning with correct letter in 100% of opportunities given min cues x 2        Additional Interventions:      Education  Barriers To Learning: none  Patient Education: Provided education regarding role of SLP, rationale for treatment tasks and general speech pathology plan of care. Learning Assessment: Pt verbalized understanding     Assessment  Impairments Requiring Therapeutic Intervention: Dysarthria , Cognitive-Linguistic Deficits , and Oropharyngeal Dysphagia   Prognosis: good    Clinical Assessment:  Pt continues to present with Mild  Dysarthria , Cognitive-Linguistic Deficits , and Oropharyngeal Dysphagia . Pt has improved with his overall attention and ability to complete higher level executive function tasks requiring less cueing. Goals met, increased accuracy of executive function goal. Improvement noted with speech, however, pt continues to report being 'off' from his baseline. Continue to target right oral motor strength and coordination to facilitate speech precision in order for return to baseline level as subjectively rated by pt, pt's family and SLP. Continue to target higher level cognitive tasks for safe return to prior level of independence. Diet Solids Recommendation:   Liquid Consistency Recommendation:   Recommended Form of Meds:   Regular texture diet     Thin liquids     Meds whole with water       Recommended Compensatory Swallowing Strategies: Upright as possible with all PO intake , No straws , Small bites/sips , Eat/feed slowly, Remain upright 30-45 min       Plan  Frequency: 60 minutes/day; 5 days per week, as tolerated, until goals met, or discharged from ARU.   Therapeutic Interventions: Diet Tolerance Monitoring  Speech / Motor Planning / Voice intervention  and Cognitive-Linguistic intervention   Discharge Recommendations: Home independently  Continued SLP at Discharge: TBD based upon progress Goals  Patient Goals: \"improve my speech\"   Time Frame: 2 weeks     Pt will tolerate recommended diet and advanced trials with no overt s/s of aspiration. Progressing, continue   Pt will improve oral motor strength and ROM for coordinated and alternating motions, via graded tasks to improve speech back to baseline level as subjectively rated by SLP/pt and family. Progressing, continue   Pt will improve executive function for multifactor task completion via graded tasks to 80% accuracy. Goal Met (86% acc across tasks)   Pt will improve executive function for multifactor task completion via graded tasks to 90% accuracy. Addended previous goal for increased accuracy   Pt will improve attention to detail for graded complex information to 80%, for improved recall and retention of newly learned information. Goal Met (91% acc across tasks)         Therapy Session Time      Session 1   Time In 0930   Time Out 1030   Time Code Minutes 38   Individual Minutes 60     Timed Code Treatment Minutes: 38  Total Treatment Minutes:  60    Electronically Signed By:  Zachery SEGUNDO CCC-SLP #[id number] 10/27/2022 9:10 AM  Speech-Language Pathologist    Martha SARAH,  Speech-Language Pathology     The speech-language pathologist was present, directed the patient's care, made skilled judgment and was responsible for assessment and treatment.

## 2022-10-27 NOTE — PROGRESS NOTES
Cristino Loaiza  10/27/2022  [id number]    Chief Complaint: Acute embolic stroke ([address]    Subjective:   No overnight events. No current complaints. Glucose elevated this am after decreasing lantus. Labs reviewed. ROS: No CP, SOB, dyspnea    Objective:  Patient Vitals for the past 24 hrs:   BP Temp Temp src Pulse Resp SpO2   10/26/22 2041 133/61 97.6 °F (36.4 °C) Oral 66 17 96 %       Gen: No distress, pleasant. Resting in WC  HEENT: Normocephalic, atraumatic  CV: Regular rate and rhythm. No MRG   Resp: No respiratory distress. CTAB   Abd: Soft, nontender nondistended  Ext: No edema   Neuro: Alert, oriented, appropriately interactive. RUE 4-/5 diffusely, RLE 4/5 diffusely    Laboratory data: Available via EMR. Therapy progress:    PT    Supine to Sit: Partial/moderate assistance  Sit to Supine:     Sit to Stand: Partial/moderate assistance  Chair/Bed to Chair Transfer: Partial/moderate assistance  Car Transfer: Partial/moderate assistance  Ambulation 10 ft: Partial/moderate assistance  Ambulation 50 ft: Partial/moderate assistance  Ambulation 150 ft:    Stairs - 1 Step: Partial/moderate assistance  Stairs - 4 Step: Partial/moderate assistance  Stairs - 12 Step: Partial/moderate assistance    OT    Eating:    Oral Hygiene: Setup or clean-up assistance  Bathing: Partial/moderate assistance  Upper Body Dressing: Partial/moderate assistance  Lower Body Dressing: Partial/moderate assistance  Toilet Transfer: Partial/moderate assistance  Toilet Hygiene: Partial/moderate assistance    Speech Therapy    Pt continues to present with Mild  Dysarthria , Cognitive-Linguistic Deficits , and Oropharyngeal Dysphagia . Pt is demonstrating progress with sustained attention to task and tolerance of a regular diet. He requires min cues for more complex executive function tasks.  Continue to target right oral motor strength and coordination to facilitate speech precision, safe diet tolerance; and cognitive tasks for safe return to prior level of independence. Body mass index is 39.93 kg/m². Assessment:  Patient Active Problem List   Diagnosis    HTN (hypertension)    DM2 (diabetes mellitus, type 2) ([facility]    High cholesterol    Acute embolic stroke ([facility]       Plan:   Left cerebral hemisphere thromboembolic infarcts: Eliquis, statin. PT/OT/SLP. Started lexapro     Nonrheumatic aortic valve stenosis: S/p TAVR on 10/11. Eliquis     DM: A1c 8.6, Lantus 20 -> 25 (80), prandial 5 (20), and SSI     DM neuropathy: no current meds     CAD: eliquis, statin     HTN: atenolol 25, lisinopril 5, HCTZ 12.5     HLD: lipitor 40     Hypokalemia: supplement, resolved. Bowels: Per protocol  Bladder: Per protocol   Sleep: Trazodone provided prn. Pain: tylenol, tramadol, robaxin PRN   DVT PPx: Eliquis   KAYE: 11/1    Team conference was held today on the patient and discussed directly with the patient utilizing their entire treatment team. Please see separate team note for details. Total treatment time for today's care >35 min. Fabio Herrera was evaluated today and a DME order was entered for a wheeled walker because he requires this to successfully complete daily living tasks of personal cares and ambulating. A wheeled walker is necessary due to the patient's unsteady gait, upper body weakness, and inability to  an ambulation device; and he can ambulate only by pushing a walker instead of a lesser assistive device such as a cane, crutch, or standard walker. The need for this equipment was discussed with the patient and he understands and is in agreement. Fabio Herrera was evaluated today and a DME order was entered for a lightweight wheelchair because he requires this to successfully complete daily living tasks of personal cares and ambulating. A lightweight wheelchair is necessary due to the patient's impaired ambulation and mobility restrictions.   The patient would not be able to resolve these daily living tasks using a cane or walker. The patient can self-propel a lightweight wheelchair safely in their home and can maneuver within their home with adequate access. The patient cannot self-propel in a standard wheelchair. The need for this equipment was discussed with the patient and he understands, is in agreement, and has not expressed an unwillingness to use the wheelchair. Ryan Hector was evaluated today and a DME order was entered for a bath/shower seat because he requires this to successfully complete daily living tasks of bathing and grooming. Patient requires a bath/shower seat due to weakness and limited ability to transfer/navigate the setting. Without the bath/shower seat, Porter Loaiza is at increased risk for falls and would require increased assistance for ADL's and mobility. Ross Zhang MD 10/27/2022, 8:27 AM    * This document was created using dictation software. While all precautions were taken to ensure accuracy, errors may have occurred. Please disregard any typographical errors.

## 2022-10-27 NOTE — CARE COORDINATION
MILO faxed patient's home care orders and DME orders to the [facility] location ([phone]). MILO also called and left a  message ([phone] extension [phone]).     Electronically signed by KAMI Morales on 10/27/2022 at 2:58 PM

## 2022-10-27 NOTE — PROGRESS NOTES
[facility] Department   Phone: [phone]    Physical Therapy    [] Initial Evaluation            [x] Daily Treatment Note         [] Discharge Summary      Patient: Esther Abarca   : 1960   MRN: [id number]   Date of Service:  10/27/2022  Admitting Diagnosis: Acute embolic stroke [facility])  Current Admission Summary: 79-year-old male with a history of diabetes, MI, HTN, HLD, diabetic neuropathy, and nonrheumatic aortic valve stenosis who was admitted to  on 10/11 for a scheduled TAVR procedure. The procedure was complicated by an episode of V. fib and postoperatively he was noted to have neurologic deficits and work-up revealed a thromboembolic stroke involving the left cerebral territories. Work-up including TTE was unremarkable for PFO. He was initiated on Eliquis for prophylaxis. Patient requires significant insulin at baseline with his normal home regimen of 80 units of Lantus with 20 units Humalog 3 times daily. A1c was 8.6. Due to decreased p.o. intake his regimen was adjusted. He was evaluated by therapy and suggested to continue in an inpatient setting prior to returning home. He reports he is frustrated by his situation but motivated to recover. Past Medical History:  has a past medical history of Carbuncle, DM2 (diabetes mellitus, type 2) ([address] ED (erectile dysfunction), History of heart attack, HTN (hypertension), Hyperlipidemia, Peripheral neuropathy, Sleep apnea, and Wears glasses. Past Surgical History:  has a past surgical history that includes Cardiac catheterization (); Pilonidal cyst excision; cyst removal; cardiovascular stress test (2012); cardiovascular stress test (2019); back surgery; Colonoscopy (2021); and Colonoscopy (N/A, 2021).   Discharge Recommendations: home with home health PT  DME Required For Discharge: rolling walker, wheelchair, ramp  Precautions/Restrictions: high fall risk  Weight Bearing Restrictions: weight bearing as tolerated  [] Right Upper Extremity  [] Left Upper Extremity [] Right Lower Extremity  [] Left Lower Extremity     Required Braces/Orthotics: no braces required   [] Right  [] Left  Positional Restrictions:no positional restrictions    Pre-Admission Information   Lives With: spouse    Type of Home: house  Home Layout: one level  Home Access:  5 step to enter without rails   Bathroom Layout: walker accessible, wheelchair accessible, tub only  Bathroom Equipment: hand held shower head, will use shower chair  Toilet Height: standard height- low  Home Equipment: single point cane, manual wheelchair  Transfer Assistance: Independent without use of device  Ambulation Assistance:Independent without use of device  ADL Assistance: independent with all ADL's, help with putting on socks  IADL Assistance: requires assistance with all homemaking tasks, would mow at home, handles his own medications  Active :        [x] Yes  [] No  Hand Dominance: [] Left  [x] Right  Current Employment: retired. Occupation: Engineering  Hobbies: Drive, fish, working on the car, yard work  [employer]: once a week in yard, has slipped a few times in shower        Subjective  General: Pt lying in bed on arrival. Pt's shirt around shoulders due to him feeling \"itchy\". Reports he spilt some urine on his shorts when urinating into his bottle. Pain: 0/10  Pain Interventions: not applicable       Functional Mobility  Bed Mobility  Supine to Sit: stand by assistance  Rolling Left: stand by assistance  Comments:   Transfers  Sit to stand transfer: contact guard assistance, minimal assistance  Stand to sit transfer: contact guard assistance, minimal assistance  Stand pivot transfer: contact guard assistance, minimal assistance  Comments: Assistance with transfers continue to vary based off his fatigue levels. Ambulation  Other Appliance: Performed LiteGait as noted below.      Distance:   Gait Mechanics: Comments:     Stair Mobility  Stair mobility not completed on this date. Comments:   Wheelchair Mobility:  Chair: manual  Surface: level surface  Method: (R) LE and (L) LE  Distance: 94' ft   Comments:   Balance  Static Sitting Balance: fair (-): maintains balance at [facility] with use of UE support  Dynamic Sitting Balance: fair (-): maintains balance at [facility] with use of UE support  Static Standing Balance: fair (-): maintains balance at [facility] with use of UE support  Dynamic Standing Balance: fair (-): maintains balance at [facility] with use of UE support  Comments:     Other Therapeutic Interventions  First Session:   Pt lying in bed on arrival. Pt requested shorts to be changed after he spilt some urine on them when using his bottle earlier this morning. Pt performed bed mobility as noted above. Pt was assisted with getting his R arm trough his shirt sleeve, and was assisted with getting his shorts off. Pt performed sit to stand with CGA, and was able to pull his clean shorts up. Pt performed stand pivot with Amarilis from bed to . Pt was taken to the gym and performed a stand pivot transfer with CGA from wc to seated stepper. Pt performed 8 min on seated stepper, and pt was able to complete 3 min using his R hand to help pedal, before using just his LE's and L UE. Pt was taken to parallel bars, where he performed X 10 R LE step-overs over a janna. Pt required verbal and tactile cues to shift weight forward and to try and take bigger steps to clear the janna. Pt was taken to ballet bars, where he performed X 20 standing marches on R LE. Second Session:   Pt in chair on arrival. Pt requested to use the restroom, and performed a toilet transfer with CGA. Pt was then taken to the gym, and put in parallel bars to don the harness to prepare for LiteGait. Pt was taken to treadmill, and performed 4 trials, consisting of 1 min 25 secs at 1.1 mph, 50 secs at 1.2 mph, 1 min 7 secs at 1.2 mph, and 1 min 23 secs at 1.2 mph.  On third trial, bungee was attached to L hip to increased L pelvis anterior rotation during swing phase, and pt's arms were moved to side rail to also prevent the forward trunk lean. As pt fatigued, he struggled with weight shifting, as he was growing slower to picking up his L LE due to not shifting weight to his R LE. Pt was returned to , and taken back to room. Pt returned to bed, and was left with alarm set and all needs within reach. Functional Outcomes                 Cognition  WFL  Orientation:    alert and oriented x 4  Command Following:   [facility]    Education  Barriers To Learning: none  Patient Education: patient educated on goals, PT role and benefits, general safety, functional mobility training, proper use of assistive device/equipment, transfer training  Learning Assessment:  patient verbalizes and demonstrates understanding    Assessment  Activity Tolerance: decreased endurance  Impairments Requiring Therapeutic Intervention: decreased functional mobility, decreased ADL status, decreased ROM, decreased strength, decreased safety awareness, decreased endurance, decreased sensation, decreased balance  Prognosis: good  Clinical Assessment:  Pt continues to present with impaired functional mobility and R sided UE/LE strength and ROM deficits. Pt is requiring less assistance with transfers, however it remains inconsistent based on fatigue levels. Pt's gait mechanics continue to worsen as he fatigues, as he attempts to compensate much more. Pt continues to require skilled ARU physical therapy services in order to address deficits and promote a safe return to home.    Safety Interventions: patient left in chair, chair alarm in place, call light within reach, and patient at risk for falls    Plan  Frequency: 5 x/week, 90 min/day  Current Treatment Recommendations: strengthening, ROM, balance training, functional mobility training, transfer training, gait training, stair training, endurance training, neuromuscular re-education, ADL/self-care training, and home management training    Goals  Patient Goals: Get back to driving, get back to moving around without falling   Short Term Goals:  Time Frame: 2 weeks  Patient will complete bed mobility at modified independent   Patient will complete transfers at [facility]   Patient will ambulate 150 ft with use of LRAD at [facility]  Patient will ascend/descend 8 stairs with (B) handrail at stand by assistance  Patient will complete car transfer at [facility]    Therapy Session Time      Individual Group Co-treatment   Time In  730      Time Out  815      Minutes  45         Individual Group Co-treatment   Time In    1330   Time Out    1415   Minutes    45     Timed Code Treatment Minutes:  45 + 45      Total Treatment Minutes:  90        Electronically Signed By: Denisse Santiago, [id number]  2nd session:  [address], [facility]  Therapist was present, directed the patient's care, made skilled judgement, and was responsible for assessment and treatment of the patient. Co-signed and supervised by:  Denisse Santiago DPT [id number]

## 2022-10-27 NOTE — DISCHARGE INSTR - COC
Continuity of Care Form    Patient Name: Lance Washburn   :  1960  MRN:  [id number]    Admit date:  10/18/2022  Discharge date:  22    Code Status Order: Full Code   Advance Directives:     Admitting Physician:  Kathrene Severs, MD  PCP: MECHE Colin CNP    Discharging Nurse:  [facility] Unit/Room#: XCN-6118/2822-37  Discharging Unit Phone Number: [phone]    Emergency Contact:   Extended Emergency Contact Information  Primary Emergency Contact: Jordyn Bermudezay [address] Phone: [phone]  Mobile Phone: [phone]  Relation: Other    Past Surgical History:  Past Surgical History:   Procedure Laterality Date    BACK SURGERY      epidural injection    CARDIAC CATHETERIZATION      normal    CARDIOVASCULAR STRESS TEST  2012    normal    CARDIOVASCULAR STRESS TEST  2019    normal    COLONOSCOPY  2021    Dr. Sedrick Borges    COLONOSCOPY N/A 2021    COLONOSCOPY POLYPECTOMY SNARE/COLD BIOPSY performed by Bessie Vee MD at [facility] 94 CYST EXCISION         Immunization History:   Immunization History   Administered Date(s) Administered    COVID-19, MODERNA BLUE border, Primary or Immunocompromised, (age 12y+), IM, 100 mcg/0.5mL 2022, 02/10/2022    Influenza 2013    Influenza Virus Vaccine 2014    Influenza, AFLURIA (age 1 yrs+), FLUZONE, (age 10 mo+), MDV, 0.5mL 2020    Influenza, FLUBLOK, (age 25 y+), PF, 0.5mL 2018    Influenza, FLUCELVAX, (age 10 mo+), MDCK, PF, 0.5mL 2021    Pneumococcal Polysaccharide ([id number]) 2013, 2022    Tdap (Boostrix, Adacel) 2013, 2022    Zoster Recombinant (Shingrix) 2021, 2021       Active Problems:  Patient Active Problem List   Diagnosis Code    HTN (hypertension) I10    DM2 (diabetes mellitus, type 2) ([facility] E11.9    High cholesterol X74.05    Acute embolic stroke ([facility] I63.9       Isolation/Infection:   Isolation            No Isolation          Patient Infection Status       None to display            Nurse Assessment:  Last Vital Signs: /61   Pulse 66   Temp 97.6 °F (36.4 °C) (Oral)   Resp 17   Ht 5' 10\" (1.778 m)   Wt 278 lb 4.8 oz (126.2 kg)   SpO2 96%   BMI 39.93 kg/m²     Last documented pain score (0-10 scale): Pain Level: 0  Last Weight:   Wt Readings from Last 1 Encounters:   10/25/22 278 lb 4.8 oz (126.2 kg)     Mental Status:  oriented, alert, logical, thought processes intact, and able to concentrate and follow conversation    IV Access:  - None    Nursing Mobility/ADLs:  Walking   Independent  Transfer  Independent  Bathing  Independent  Dressing  Independent  [address]  Med Delivery   whole    Wound Care Documentation and Therapy:        Elimination:  Continence: Bowel: Yes  Bladder: Yes  Urinary Catheter: None   Colostomy/Ileostomy/Ileal Conduit: No       Date of Last BM: 10/31/22    Intake/Output Summary (Last 24 hours) at 10/27/2022 0850  Last data filed at 10/27/2022 0130  Gross per 24 hour   Intake 240 ml   Output 600 ml   Net -360 ml     I/O last 3 completed shifts: In: 240 [P.O.:240]  Out: 600 [Urine:600]    Safety Concerns:     History of Falls (last 30 days)    Impairments/Disabilities:      None    Nutrition Therapy:  Current Nutrition Therapy:   - Oral Diet:  General    Routes of Feeding: Oral  Liquids: No Restrictions  Daily Fluid Restriction: no  Last Modified Barium Swallow with Video (Video Swallowing Test): not done    Treatments at the Time of Hospital Discharge:   Respiratory Treatments: NA  Oxygen Therapy:  is not on home oxygen therapy.   Ventilator:    - No ventilator support    Rehab Therapies: Physical Therapy and Occupational Therapy  Weight Bearing Status/Restrictions: No weight bearing restrictions  Other Medical Equipment (for information only, NOT a DME order): wheelchair and cane  Other Treatments: NA    Patient's personal belongings (please select all that are sent with patient):  Glasses    RN SIGNATURE:  Electronically signed by Ariela Melendez RN on 11/1/22 at 11:32 AM EDT    CASE MANAGEMENT/SOCIAL WORK SECTION    Inpatient Status Date: 10/18/2022    Readmission Risk Assessment Score: 11%  Readmission Risk              Risk of Unplanned Readmission:  12           Discharging to Facility/ Agency   Name: [facility]  Phone: [phone]  Fax: [phone] (fax)      / signature: Electronically signed by KAMI Fraire on 11/1/2022 at 12:16 PM     PHYSICIAN SECTION    Prognosis: Good    Condition at Discharge: Stable    Rehab Potential (if transferring to Rehab): Good    Recommended Labs or Other Treatments After Discharge: PT/OT/SLP/RN/Aide/MSW    Physician Certification: I certify the above information and transfer of Fabio Herrera  is necessary for the continuing treatment of the diagnosis listed and that he requires [address] for greater 30 days.      Update Admission H&P: No change in H&P    PHYSICIAN SIGNATURE:  Electronically signed by Cindy Griffin MD on 10/27/22 at 8:51 AM EDT

## 2022-10-27 NOTE — PLAN OF CARE
Problem: Discharge Planning  Goal: Discharge to home or other facility with appropriate resources  Outcome: Progressing  Flowsheets (Taken 10/27/2022 1704)  Discharge to home or other facility with appropriate resources:   Identify barriers to discharge with patient and caregiver   Identify discharge learning needs (meds, wound care, etc)   Refer to discharge planning if patient needs post-hospital services based on physician order or complex needs related to functional status, cognitive ability or social support system     Problem: Skin/Tissue Integrity  Goal: Absence of new skin breakdown  Description: 1. Monitor for areas of redness and/or skin breakdown  2. Assess vascular access sites hourly  3. Every 4-6 hours minimum:  Change oxygen saturation probe site  4. Every 4-6 hours:  If on nasal continuous positive airway pressure, respiratory therapy assess nares and determine need for appliance change or resting period.   Outcome: Progressing     Problem: Safety - Adult  Goal: Free from fall injury  Outcome: Progressing  Flowsheets (Taken 10/27/2022 1704)  Free From Fall Injury: Instruct family/caregiver on patient safety     Problem: ABCDS Injury Assessment  Goal: Absence of physical injury  Outcome: Progressing  Flowsheets (Taken 10/27/2022 1704)  Absence of Physical Injury: Implement safety measures based on patient assessment     Problem: Pain  Goal: Verbalizes/displays adequate comfort level or baseline comfort level  Outcome: Progressing  Flowsheets (Taken 10/27/2022 1704)  Verbalizes/displays adequate comfort level or baseline comfort level:   Assess pain using appropriate pain scale   Administer analgesics based on type and severity of pain and evaluate response   Implement non-pharmacological measures as appropriate and evaluate response   Encourage patient to monitor pain and request assistance     Problem: Chronic Conditions and Co-morbidities  Goal: Patient's chronic conditions and co-morbidity symptoms are monitored and maintained or improved  Outcome: Progressing  Flowsheets (Taken 10/27/2022 1704)  Care Plan - Patient's Chronic Conditions and Co-Morbidity Symptoms are Monitored and Maintained or Improved:   Monitor and assess patient's chronic conditions and comorbid symptoms for stability, deterioration, or improvement   Collaborate with multidisciplinary team to address chronic and comorbid conditions and prevent exacerbation or deterioration

## 2022-10-27 NOTE — CARE COORDINATION
Team conference held today. Spoke with patient to discuss progress with therapy, barriers to discharge, and plans to return home. Estimated discharge date set for 11/01/2022. Patient anticipates discharging to [facility] with needed supports. Will continue to follow for support and discharge planning.     Electronically signed by KAMI Herring on 10/27/2022 at 2:44 PM

## 2022-10-28 LAB
GLUCOSE BLD-MCNC: 153 MG/DL (ref 70–99)
GLUCOSE BLD-MCNC: 163 MG/DL (ref 70–99)
GLUCOSE BLD-MCNC: 194 MG/DL (ref 70–99)
GLUCOSE BLD-MCNC: 205 MG/DL (ref 70–99)
PERFORMED ON: ABNORMAL

## 2022-10-28 PROCEDURE — 97129 THER IVNTJ 1ST 15 MIN: CPT

## 2022-10-28 PROCEDURE — 92526 ORAL FUNCTION THERAPY: CPT

## 2022-10-28 PROCEDURE — 97110 THERAPEUTIC EXERCISES: CPT

## 2022-10-28 PROCEDURE — 6370000000 HC RX 637 (ALT 250 FOR IP): Performed by: PHYSICAL MEDICINE & REHABILITATION

## 2022-10-28 PROCEDURE — 97112 NEUROMUSCULAR REEDUCATION: CPT

## 2022-10-28 PROCEDURE — 97116 GAIT TRAINING THERAPY: CPT

## 2022-10-28 PROCEDURE — 97535 SELF CARE MNGMENT TRAINING: CPT

## 2022-10-28 PROCEDURE — 97130 THER IVNTJ EA ADDL 15 MIN: CPT

## 2022-10-28 PROCEDURE — 1280000000 HC REHAB R&B

## 2022-10-28 PROCEDURE — 97530 THERAPEUTIC ACTIVITIES: CPT

## 2022-10-28 PROCEDURE — 92507 TX SP LANG VOICE COMM INDIV: CPT

## 2022-10-28 RX ORDER — INSULIN GLARGINE 100 [IU]/ML
27 INJECTION, SOLUTION SUBCUTANEOUS NIGHTLY
Status: DISCONTINUED | OUTPATIENT
Start: 2022-10-28 | End: 2022-10-31

## 2022-10-28 RX ADMIN — INSULIN GLARGINE 27 UNITS: 100 INJECTION, SOLUTION SUBCUTANEOUS at 22:18

## 2022-10-28 RX ADMIN — INSULIN LISPRO 5 UNITS: 100 INJECTION, SOLUTION INTRAVENOUS; SUBCUTANEOUS at 12:49

## 2022-10-28 RX ADMIN — POTASSIUM CHLORIDE 20 MEQ: 1500 TABLET, EXTENDED RELEASE ORAL at 08:29

## 2022-10-28 RX ADMIN — ATENOLOL 25 MG: 50 TABLET ORAL at 08:30

## 2022-10-28 RX ADMIN — INSULIN LISPRO 2 UNITS: 100 INJECTION, SOLUTION INTRAVENOUS; SUBCUTANEOUS at 12:48

## 2022-10-28 RX ADMIN — CAPSAICIN: 0.25 CREAM TOPICAL at 22:09

## 2022-10-28 RX ADMIN — ATORVASTATIN CALCIUM 40 MG: 40 TABLET, FILM COATED ORAL at 22:10

## 2022-10-28 RX ADMIN — INSULIN LISPRO 5 UNITS: 100 INJECTION, SOLUTION INTRAVENOUS; SUBCUTANEOUS at 17:40

## 2022-10-28 RX ADMIN — LISINOPRIL 5 MG: 5 TABLET ORAL at 08:31

## 2022-10-28 RX ADMIN — CAPSAICIN: 0.25 CREAM TOPICAL at 08:32

## 2022-10-28 RX ADMIN — ESCITALOPRAM OXALATE 5 MG: 10 TABLET ORAL at 08:31

## 2022-10-28 RX ADMIN — APIXABAN 5 MG: 5 TABLET, FILM COATED ORAL at 22:10

## 2022-10-28 RX ADMIN — APIXABAN 5 MG: 5 TABLET, FILM COATED ORAL at 08:30

## 2022-10-28 RX ADMIN — HYDROCHLOROTHIAZIDE 12.5 MG: 25 TABLET ORAL at 08:29

## 2022-10-28 RX ADMIN — INSULIN LISPRO 5 UNITS: 100 INJECTION, SOLUTION INTRAVENOUS; SUBCUTANEOUS at 08:32

## 2022-10-28 ASSESSMENT — PAIN SCALES - GENERAL
PAINLEVEL_OUTOF10: 7
PAINLEVEL_OUTOF10: 0

## 2022-10-28 ASSESSMENT — PAIN DESCRIPTION - LOCATION: LOCATION: BACK

## 2022-10-28 NOTE — PROGRESS NOTES
[facility] Department   Phone: [phone]    Occupational Therapy    [] Initial Evaluation            [x] Daily Treatment Note         [] Discharge Summary      Patient: Nikky Meza   : 1960   MRN: [id number]   Date of Service:  10/28/2022    Admitting Diagnosis:  Acute embolic stroke [facility])  Current Admission Summary: 24-year-old male with a history of diabetes, MI, HTN, HLD, diabetic neuropathy, and nonrheumatic aortic valve stenosis who was admitted to  on 10/11 for a scheduled TAVR procedure. The procedure was complicated by an episode of V. fib and postoperatively he was noted to have neurologic deficits and work-up revealed a thromboembolic stroke involving the left cerebral territories. Work-up including TTE was unremarkable for PFO. He was initiated on Eliquis for prophylaxis. Patient requires significant insulin at baseline with his normal home regimen of 80 units of Lantus with 20 units Humalog 3 times daily. A1c was 8.6. Due to decreased p.o. intake his regimen was adjusted. He was evaluated by therapy and suggested to continue in an inpatient setting prior to returning home  Past Medical History:  has a past medical history of Carbuncle, DM2 (diabetes mellitus, type 2) ([address] ED (erectile dysfunction), History of heart attack, HTN (hypertension), Hyperlipidemia, Peripheral neuropathy, Sleep apnea, and Wears glasses. Past Surgical History:  has a past surgical history that includes Cardiac catheterization (); Pilonidal cyst excision; cyst removal; cardiovascular stress test (2012); cardiovascular stress test (2019); back surgery; Colonoscopy (2021); and Colonoscopy (N/A, 2021).     Discharge Recommendations: HH OT S3    DME Required For Discharge: TTB with cut out, grab bars near commode and in shower, w/c, RW, reacher, hand held shower head    Precautions/Restrictions: high fall risk, up as tolerated  Weight Bearing Restrictions: no restrictions    Required Braces/Orthotics: no braces required  Positional Restrictions:no positional restrictions    Pre-Admission Information   Lives With: spouse                  Type of Home: house  Home Layout: one level  Home Access:  5 step to enter without rails   Bathroom Layout: walker accessible, wheelchair accessible, tub/shower only  Bathroom Equipment: hand held shower head  Toilet Height: standard height- low  Home Equipment: single point cane, manual wheelchair  Transfer Assistance: Independent without use of device  Ambulation Assistance:Independent without use of device  ADL Assistance: independent with all ADL's with exception of assist to don socks- pt is IND w/ shoes pending shoe style   IADL Assistance: requires assistance with most homemaking tasks; pt IND to mow lawn on riding mower; IND with medications and light meal prep (microwave use/snacks)  Active :        [x] Yes                 [] No  Hand Dominance: [] Left                 [x] Right  Current Employment: retired.   Occupation: Engineering  Hobbies: Drive, fish, working on the car, yard work  [employer]: once a week in yard, has slipped a few times in shower        Subjective:   General: Pt in recliner at entry, agreeable to OT session and shower   Pain Interventions: not applicable        Activities of Daily Living  Basic Activities of Daily Living  Grooming: setup assistance  Grooming Comments: oral care w/c level at sink  Upper Extremity Bathing: stand by assistance  Lower Extremity Bathing: stand by assistance   Bathing Equipment: long handled sponge  Bathing Comments: completed all in sitting on TTB w/ cut-out- use of LHS for LE and buttocks, increased time and Min VC for thoroughness of rinsing and drying    Upper Extremity Dressing: stand by assistance requires verbal cueing Increased time to complete task  Lower Extremity Dressing: minimal assistance  Dressing Equipment: reacher, dressing stick  Dressing Comments: pt doffed socks w/ dressing stick with VC, pt able to thread feet into shorts w/ increased time and no AE, CGA for clothing over hips in stance- assist required to don socks (pt has assist for donning socks at baseline). General Comments: pt ambulated to bathroom with RW- bathing/dressing completed seated on TTB- carryover of meli-techniques noted, increased time and multiple rest breaks throughout   Instrumental Activities of Daily Living  No IADL completed on this date. Functional Mobility  Bed Mobility  Sit to Supine: stand by assistance  Comments:  Transfers  Sit to stand transfer:contact guard assistance  Stand to sit transfer: contact guard assistance  Stand pivot transfer: contact guard assistance  Shower transfer: contact guard assistance  Shower transfer equipment: grab bars, walker  Shower transfer comments: SPT w/ grab bar to w/c post bathing   Comments:     Functional Mobility:  Sitting Balance: supervision. Standing Balance: contact guard assistance.     Functional Mobility: .  contact guard assistance  Functional Mobility Activity: recliner>shower   Functional Mobility Device Use: rolling walker  Functional Mobility Comment: use of RW w/ (R) splint for mobility, R LE partial buckling 3x during mobility but pt able to correct balance w/o physical assist and use of RW UE support       Additional Activity:    W/C level item retrieval and transportation to increase ADL IND and safety for d/c planning: pt retrieved 4 items from lower cabinets and transported them along counter top and to/from refrigerator- good sitting balance throughout     Table top gross/FMC of R UE activity to increase functional grasp and strength and facilitate neuro recovery: 12 reps x2 of scap elevations and retractions; pt introduced to theraputty TE w/ emphasis on R hand- pt completes log rolls, palmar grasp w/ hand to hand transitions, pinch, and rotations w/ putty- pt verbalized understanding     AROM and TE with light band seated EOB per pt request to use band for UB TE: R hand wrapped for increased grasp- pt completed forward punches, elbow flexion, and shoulder abduction x8 each, pronation/supination and shoulder flexion w/o band x8         Cognition  Overall Cognitive Status: WFL  Arousal/Alterness: appropriate responses to stimuli  Following Commands: follows all commands without difficulty  Attention Span: attends with cues to redirect  Safety Judgement: decreased awareness of need for assistance, decreased awareness of need for safety  Problem Solving: assistance required to implement solutions  Insights: fully aware of deficits  Initiation: requires cues for some  Sequencing: requires cues for some  Comment: cog appears overall WFL based on functional tasks completed  Orientation:    alert and oriented x 4  Command Following:   [facility]     Education  Barriers To Learning: none  Patient Education: patient educated on goals, OT role and benefits, plan of care, ADL adaptive strategies, IADL safety, HEP, proper use of assistive device/equipment, adaptive device training, transfer training, discharge recommendations, AROM of involved R UE  Learning Assessment:  patient verbalizes understanding, would benefit from continued reinforcement    Assessment  Activity Tolerance: tolerated well, requires rest breaks through activities. Impairments Requiring Therapeutic Intervention: decreased functional mobility, decreased ADL status, decreased ROM, decreased strength, decreased safety awareness, decreased endurance, decreased balance, decreased IADL, decreased fine motor control, decreased coordination  Prognosis: good  Clinical Assessment: Pt is a 60 yr old male who presents post CVA w/ primarily R side and speech/language deficits. Pt is below baseline level of function d/t above deficits. Pt is progressing well- continues to required Min-CGA for mobility/transfers.  Pt completed gross/fine motor coordination activity for R UE for additional neuro recovery. Pt and family training/education on TTB method and use w/ pt trial 1x during session. Pt easily fatigued w/ functional activity- participates well within sessions, continues to demo motivated for therapy. Pt continues to benefit from intensive inpt therapy to maximize functional independence and safety. Continue with POC.   Safety Interventions: patient left in chair, chair alarm in place, call light within reach, gait belt, patient at risk for falls, and family/caregiver present    Plan  Frequency: 5 x/week, 60 min/day  Current Treatment Recommendations: strengthening, ROM, balance training, functional mobility training, transfer training, endurance training, neuromuscular re-education, ADL/self-care training, IADL training, home management training, safety education, equipment evaluation/education, and positioning    Goals  Patient Goals: \"get back to driving\", regain IND with mobility    Short Term Goals:  Time Frame: 10-14 days  Patient will complete upper body ADL at supervision   Patient will complete lower body ADL at minimal assistance- goal met 10/28   Patient will complete toileting at minimal assistance   Patient will complete self-feeding at modified independent - goal met 10/28  Patient will complete grooming at modified independent   Patient will complete functional transfers at supervision     Long Term Goals:  Time Frame: 2-3 weeks   Patient will complete upper body ADL at modified independent   Patient will complete lower body ADL at modified independent   Patient will complete toileting at modified independent   Patient will complete functional transfers at modified independent   Patient will complete functional mobility at modified independent   Patient to gather and transport IADL items at supervision      -pt progressing towards goals  10/28    Therapy Session Time     Individual Group Co-treatment   Time In 1245      Time Out 1400      Minutes 75         Timed Code Treatment Minutes: 75  Total Treatment Minutes:  75       Electronically Signed By: MELANI Ordaz/CHASE #[id number]

## 2022-10-28 NOTE — PROGRESS NOTES
[facility]   Phone: [phone]    Speech Therapy    [] Initial Evaluation            [x] Daily Treatment Note         [] Discharge Summary      Patient: Shea Awad   : 1960   MRN: [id number]   Date of Service:  10/28/2022  Admitting Diagnosis: Acute embolic stroke [facility])  Current Admission Summary: 60-year-old male with a history of diabetes, MI, HTN, HLD, diabetic neuropathy, and nonrheumatic aortic valve stenosis who was admitted to  on 10/11 for a scheduled TAVR procedure. The procedure was complicated by an episode of V. fib and postoperatively he was noted to have neurologic deficits and work-up revealed a thromboembolic stroke involving the left cerebral territories. Work-up including TTE was unremarkable for PFO. He was initiated on Eliquis for prophylaxis. Patient requires significant insulin at baseline with his normal home regimen of 80 units of Lantus with 20 units Humalog 3 times daily. A1c was 8.6. Due to decreased p.o. intake his regimen was adjusted. He was evaluated by therapy and suggested to continue in an inpatient setting prior to returning home. He reports he is frustrated by his situation but motivated to recover. Past Medical History:  has a past medical history of Carbuncle, DM2 (diabetes mellitus, type 2) ([address] ED (erectile dysfunction), History of heart attack, HTN (hypertension), Hyperlipidemia, Peripheral neuropathy, Sleep apnea, and Wears glasses. Past Surgical History:  has a past surgical history that includes Cardiac catheterization (); Pilonidal cyst excision; cyst removal; cardiovascular stress test (2012); cardiovascular stress test (2019); back surgery; Colonoscopy (2021); and Colonoscopy (N/A, 2021).   Precautions/Restrictions: high fall risk, up as tolerated  Instrumental Swallow Study: FEES at outside hospital completed on 10/12/2022      Pre-Admission Information   Living Status: Pt lives with his significant other Romi Lopez, a dog and cat. 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 Motivated to participate in therapy. Pt with increased distraction during sessions secondary to events occurring in personal life. Pain: Denied pain   Safety Interventions: patient left in chair and chair alarm in place       Therapeutic Interventions:     Session 1:   Dysphagia: Severity: Mild   Goal not targeted this session    Motor Speech: Severity: Mild  and Moderate    Goal not targeted this session    Cognition: Severity: Mild   Delayed recall (~24 hrs)  -pt recalled 13/20 (65%) of items with a initial letter visual cue (alphabet letters) from working/immediate memory activity completed yesterday (10/27/2022), increased to 100% recall with min semantic cues     Thought organization and reasoning targeted via deduction puzzle   -pt with reduced understanding of complex directions. Required repetition, explanation of directions, model x 3 to gain understanding of directions (I.e if it says the date does not fall on a Monday, then you would cross out all Mondays).  Following these cues, pt able to independently complete activity    -pt identified detail from sentence with 57% (4/7) accuracy       Executive functioning   -pt answered questions from visual stimuli (mock cell phone bill) with 90% (9/10) accuracy, increased to 100% given a min cue to scan the entire page   -pt identified details (name of company, bill address, amounts due, etc.)  from visual stimuli with 100% accuracy (7/7)    Additional Interventions:    Session 2:   Dysphagia: Severity: Mild   Patient tolerated NMES via VitalStim placement 4a (targeting the orpharyngeal \"sling\", intrinsic/extrinsic muscles of the tongue and pharyngeal constrictor) @ R 10.0 mA and L 3.0 mA for 20 minutes (reported even sensation on R/L sides). Slight contraction noted under eye/ no contraction noted to R upper labial surface. Utilized in combination with bolus control exercises and motor speech tasks (see below). - Labial strengthening and coordination exercise completed via pudding through a straw to focus on labial seal and protrusion, pt required 6 attempts to expel pudding to oral cavity     Bolus control exercises utilized with toothette   -Lateralization exercises x 10  -A-P propulsion x 10   -A-P propulsion with SLP applying resistance x 10    -bow tie (R back, opposite side L front, R front, opposite side back L)     Motor Speech: Severity: Mild  and Moderate    Motor speech exercises  -Listened to recording from previous date (10/27) with reflection by pt. Pt stated \"I sound slow, and I'm not pronouncing my words right\"  -pt repeated sentences from SLP grad student with repetitive sounds and multi-syllabic words x 6. Pt instructed to focus on going slow, speaking loudly, and over articulating. Recording was utilized for self reflection. Pt noticed significant difference compared to previous date recording when using speech strategies. -pt repeated dysarthric words from sentence x 3, pt with dysarthric production of words x 4         Cognition: Severity: Mild   Goal not targeted this session    Additional Interventions: Provided pt with a written list of oral motor exercises to complete Independently over the weekend as he does not have therapy. Pt v/u and able to show return demonstration. Encouraged pt to utilize mirror when completing exercises for improved efficiency and symmetry of R side.        Education  Barriers To Learning: none  Patient Education: Provided education regarding role of SLP, rationale for treatment tasks and general speech pathology plan of care. Learning Assessment: Pt verbalized understanding     Assessment  Impairments Requiring Therapeutic Intervention: Dysarthria , Cognitive-Linguistic Deficits , and Oropharyngeal Dysphagia   Prognosis: good    Clinical Assessment:  Pt continues to present with Mild  Dysarthria , Cognitive-Linguistic Deficits , and Oropharyngeal Dysphagia . Pt has improved with his overall attention and ability to complete higher level executive function tasks requiring less cueing. Goals met, increased accuracy of executive function goal. Improvement noted with speech, however, pt continues to report being 'off' from his baseline. Continue to target right oral motor strength and coordination to facilitate speech precision in order for return to baseline level as subjectively rated by pt, pt's family and SLP. Continue to target higher level cognitive tasks for safe return to prior level of independence. Diet Solids Recommendation:   Liquid Consistency Recommendation:   Recommended Form of Meds:   Regular texture diet     Thin liquids     Meds whole with water       Recommended Compensatory Swallowing Strategies: Upright as possible with all PO intake , No straws , Small bites/sips , Eat/feed slowly, Remain upright 30-45 min       Plan  Frequency: 60 minutes/day; 5 days per week, as tolerated, until goals met, or discharged from ARU. Therapeutic Interventions: Diet Tolerance Monitoring  Speech / Motor Planning / Voice intervention  and Cognitive-Linguistic intervention   Discharge Recommendations: Home independently  Continued SLP at Discharge: TBD based upon progress     Goals  Patient Goals: \"improve my speech\"   Time Frame: 2 weeks     Pt will tolerate recommended diet and advanced trials with no overt s/s of aspiration.  Progressing, continue   Pt will improve oral motor strength and ROM for coordinated and alternating motions, via graded tasks to improve speech back to baseline level as subjectively rated by SLP/pt and family. Progressing, continue   Pt will improve executive function for multifactor task completion via graded tasks to 80% accuracy. Goal Met (86% acc across tasks)   Pt will improve executive function for multifactor task completion via graded tasks to 90% accuracy. Addended previous goal for increased accuracy   Pt will improve attention to detail for graded complex information to 80%, for improved recall and retention of newly learned information. Goal Met (91% acc across tasks)         Therapy Session Time      Session 1 Session 2    Time In 0900 1030   Time Out 0930 1100   Time Code Minutes 30 0   Individual Minutes 30 30     Timed Code Treatment Minutes: 30  Total Treatment Minutes:  60    Electronically Signed By:  Carmine SEGUNDO CCC-SLP #[id number] 10/28/2022 10:05 AM  Speech-Language Pathologist    Jaja SARAH,  Speech-Language Pathology     The speech-language pathologist was present, directed the patient's care, made skilled judgment and was responsible for assessment and treatment.

## 2022-10-28 NOTE — PLAN OF CARE
Problem: Skin/Tissue Integrity  Goal: Absence of new skin breakdown  Description: 1. Monitor for areas of redness and/or skin breakdown  2. Assess vascular access sites hourly  3. Every 4-6 hours minimum:  Change oxygen saturation probe site  4. Every 4-6 hours:  If on nasal continuous positive airway pressure, respiratory therapy assess nares and determine need for appliance change or resting period.   10/28/2022 0317 by Rosita Spring RN  Outcome: Progressing

## 2022-10-28 NOTE — PROGRESS NOTES
[facility]   Phone: [phone]    Physical Therapy    [] Initial Evaluation            [x] Daily Treatment Note         [] Discharge Summary      Patient: Hasmukh Almazan   : 1960   MRN: [id number]   Date of Service:  10/28/2022  Admitting Diagnosis: Acute embolic stroke [facility])  Current Admission Summary: 79-year-old male with a history of diabetes, MI, HTN, HLD, diabetic neuropathy, and nonrheumatic aortic valve stenosis who was admitted to  on 10/11 for a scheduled TAVR procedure. The procedure was complicated by an episode of V. fib and postoperatively he was noted to have neurologic deficits and work-up revealed a thromboembolic stroke involving the left cerebral territories. Work-up including TTE was unremarkable for PFO. He was initiated on Eliquis for prophylaxis. Patient requires significant insulin at baseline with his normal home regimen of 80 units of Lantus with 20 units Humalog 3 times daily. A1c was 8.6. Due to decreased p.o. intake his regimen was adjusted. He was evaluated by therapy and suggested to continue in an inpatient setting prior to returning home. He reports he is frustrated by his situation but motivated to recover. Past Medical History:  has a past medical history of Carbuncle, DM2 (diabetes mellitus, type 2) ([address] ED (erectile dysfunction), History of heart attack, HTN (hypertension), Hyperlipidemia, Peripheral neuropathy, Sleep apnea, and Wears glasses. Past Surgical History:  has a past surgical history that includes Cardiac catheterization (); Pilonidal cyst excision; cyst removal; cardiovascular stress test (2012); cardiovascular stress test (2019); back surgery; Colonoscopy (2021); and Colonoscopy (N/A, 2021).   Discharge Recommendations: home with home health PT  DME Required For Discharge: rolling walker, wheelchair, ramp  Precautions/Restrictions: high fall risk  Weight Bearing Restrictions: weight bearing as tolerated  [] Right Upper Extremity  [] Left Upper Extremity [] Right Lower Extremity  [] Left Lower Extremity     Required Braces/Orthotics: no braces required   [] Right  [] Left  Positional Restrictions:no positional restrictions    Pre-Admission Information   Lives With: spouse    Type of Home: house  Home Layout: one level  Home Access:  5 step to enter without rails   Bathroom Layout: walker accessible, wheelchair accessible, tub only  Bathroom Equipment: hand held shower head, will use shower chair  Toilet Height: standard height- low  Home Equipment: single point cane, manual wheelchair  Transfer Assistance: Independent without use of device  Ambulation Assistance:Independent without use of device  ADL Assistance: independent with all ADL's, help with putting on socks  IADL Assistance: requires assistance with all homemaking tasks, would mow at home, handles his own medications  Active :        [x] Yes  [] No  Hand Dominance: [] Left  [x] Right  Current Employment: retired. Occupation: Engineering  Hobbies: Drive, fish, working on the car, yard work  [employer]: once a week in yard, has slipped a few times in shower        Subjective  General: Pt sitting upright on side of bed with nursing on arrival.   Pain: 0/10  Pain Interventions: not applicable       Functional Mobility  Bed Mobility  Bed mobility not completed on this date. Comments:   Transfers  Sit to stand transfer: contact guard assistance, minimal assistance  Stand to sit transfer: contact guard assistance, minimal assistance  Stand pivot transfer: contact guard assistance, minimal assistance  Squat pivot transfer: contact guard assistance  Comments: Assistance with transfers continue to vary based off his fatigue levels.   Ambulation  Surface:level surface  Assistive Device: rolling walker  Assistance: contact guard assistance   Distance: 94 ft  Gait Mechanics:   Comments:     Stair Mobility  Number of Steps: 3  Step Height: 6 inch  Hand Rails: (B) handrail  Assistance: contact guard assistance  Comments: Required verbal cues for proper sequencing of ascending with non-affected leg first and descending  with affected leg first.   Wheelchair Mobility:  Chair: manual  Surface: level surface  Method: (R) LE and (L) LE  Distance: 20 ft + 10 ft  Comments:   Balance  Static Sitting Balance: fair (-): maintains balance at CGA with use of UE support  Dynamic Sitting Balance: fair (-): maintains balance at CGA with use of UE support  Static Standing Balance: fair (-): maintains balance at CGA with use of UE support  Dynamic Standing Balance: fair (-): maintains balance at CGA with use of UE support  Comments:     Other Therapeutic Interventions  First Session:   Pt upright on side of bed with nursing on arrival. Pt performed sit to stand from the bed with CGA, and pt was able to pull his pants up independently. Pt performed a squat pivot transfer with Amarilis and max verbal cueing to improve sequencing and for safety education. Pt was donned Ace wrap bandage, and ambulated 94 ft to gym with RW and CGA. Pt's gait mechanics and compensations are noted above. Recommendation for AFO provided due to decreased R foot clearance. Pt voiced understanding. Pt was taken to parallel bars, where he performed step-to activity with pads to improve step length and ability to keep feet forward when walking. Pt also performed X 5 sit to stands with CGA without UE assist. Pt performed X 10 B 6\" toe taps with pads used as targets to assist with foot placement. Pt manually propelled wc 20 ft, and was taken back to his room. Pt manually propelled his wc to his recliner to work on transfers set up, and pt required education on being as close as possible to the spot he is wanting to transfer to. Pt was left in recliner with alarm set and all needs within reach.      Second Session:   Pt in chair on arrival. Pt performed squat pivot transfer with CGA to wc. Pt was taken to the gym, where he performed stair mobility as noted above. Pt then practiced transfer training at the mat table. Pt was asked to position his wc in a good position for transfer, and performed X 1 squat pivot leading to his L and X 1 squat pivot back to his wc leading to the R. Pt also performed X 1 stand pivot leading to his R, and X 1 stand pivot back to his wc leading to his L. Pt was provided verbal cues and education on hand sequencing and importance of positioning wc as close to the place being transferred to as possible. Pt was taken back to his room, where he performed a squat pivot transfer with CGA back to his recliner. Pt was left with alarm set and all needs within reach. Functional Outcomes                 Cognition  WFL  Orientation:    alert and oriented x 4  Command Following:   [facility]    Education  Barriers To Learning: none  Patient Education: patient educated on goals, PT role and benefits, general safety, functional mobility training, proper use of assistive device/equipment, transfer training  Learning Assessment:  patient verbalizes and demonstrates understanding    Assessment  Activity Tolerance: decreased endurance  Impairments Requiring Therapeutic Intervention: decreased functional mobility, decreased ADL status, decreased ROM, decreased strength, decreased safety awareness, decreased endurance, decreased sensation, decreased balance  Prognosis: good  Clinical Assessment:  Pt continues to present with impaired functional mobility and R sided UE/LE strength and ROM deficits. Pt is requiring less assistance with transfers, however it remains inconsistent based on fatigue levels. Pt with decreased safety when attempting to line up transfers himself. Pt continues to require skilled ARU physical therapy services in order to address deficits and promote a safe return to home.    Safety Interventions: patient left in chair, chair alarm in place, call light within reach, and patient at risk for falls    Plan  Frequency: 5 x/week, 90 min/day  Current Treatment Recommendations: strengthening, ROM, balance training, functional mobility training, transfer training, gait training, stair training, endurance training, neuromuscular re-education, ADL/self-care training, and home management training    Goals  Patient Goals: Get back to driving, get back to moving around without falling   Short Term Goals:  Time Frame: 2 weeks  Patient will complete bed mobility at modified independent   Patient will complete transfers at [facility]   Patient will ambulate 150 ft with use of LRAD at [facility]  Patient will ascend/descend 8 stairs with (B) handrail at stand by assistance  Patient will complete car transfer at [facility] [place]    Therapy Session Time      Individual Group Co-treatment   Time In  730      Time Out  820      Minutes  50         Individual Group Co-treatment   Time In 930      Time Out 1000      Minutes 30        Timed Code Treatment Minutes:  50 + 30      Total Treatment Minutes:  80        Electronically Signed By: Edna Owens, PT  2nd session:  DOM Wick  Therapist was present, directed the patient's care, made skilled judgement, and was responsible for assessment and treatment of the patient. Co-signed and supervised by:  Edna Owens DPT [id number]

## 2022-10-28 NOTE — PROGRESS NOTES
Jone Loaiza  10/28/2022  [id number]    Chief Complaint: Acute embolic stroke ([address]    Subjective:   No overnight events. No current complaints. Glucose remains elevated this am.     ROS: No CP, SOB, dyspnea    Objective:  Patient Vitals for the past 24 hrs:   BP Temp Temp src Pulse Resp SpO2   10/28/22 0715 125/66 98.5 °F (36.9 °C) Oral 65 16 --   10/27/22 2015 (!) 154/64 97.9 °F (36.6 °C) Oral 62 16 94 %       Gen: No distress, pleasant. Resting in WC  HEENT: Normocephalic, atraumatic  CV: Regular rate and rhythm. No MRG   Resp: No respiratory distress. CTAB   Abd: Soft, nontender nondistended  Ext: No edema   Neuro: Alert, oriented, appropriately interactive. RUE 4-/5 diffusely, RLE 4/5 diffusely    Laboratory data: Available via EMR. Therapy progress:    PT    Supine to Sit: Partial/moderate assistance  Sit to Supine:     Sit to Stand: Partial/moderate assistance  Chair/Bed to Chair Transfer: Partial/moderate assistance  Car Transfer: Partial/moderate assistance  Ambulation 10 ft: Partial/moderate assistance  Ambulation 50 ft: Partial/moderate assistance  Ambulation 150 ft:    Stairs - 1 Step: Partial/moderate assistance  Stairs - 4 Step: Partial/moderate assistance  Stairs - 12 Step: Partial/moderate assistance    OT    Eating:    Oral Hygiene: Setup or clean-up assistance  Bathing: Partial/moderate assistance  Upper Body Dressing: Partial/moderate assistance  Lower Body Dressing: Partial/moderate assistance  Toilet Transfer: Supervision or touching assistance  Toilet Hygiene: Partial/moderate assistance    Speech Therapy    Pt continues to present with Mild  Dysarthria , Cognitive-Linguistic Deficits , and Oropharyngeal Dysphagia . Pt has improved with his overall attention and ability to complete higher level executive function tasks requiring less cueing.  Goals met, increased accuracy of executive function goal. Improvement noted with speech, however, pt continues to report being 'off' from his baseline. Continue to target right oral motor strength and coordination to facilitate speech precision in order for return to baseline level as subjectively rated by pt, pt's family and SLP. Continue to target higher level cognitive tasks for safe return to prior level of independence. Body mass index is 39.93 kg/m². Assessment:  Patient Active Problem List   Diagnosis    HTN (hypertension)    DM2 (diabetes mellitus, type 2) ([facility]    High cholesterol    Acute embolic stroke ([facility]       Plan:   Left cerebral hemisphere thromboembolic infarcts: Eliquis, statin. PT/OT/SLP. Started lexapro. - R AFO ordered. Nonrheumatic aortic valve stenosis: S/p TAVR on 10/11. Eliquis     DM: A1c 8.6, Lantus 25 -> 27 (80), prandial 5 (20), and SSI     DM neuropathy: no current meds     CAD: eliquis, statin     HTN: atenolol 25, lisinopril 5, HCTZ 12.5     HLD: lipitor 40     Hypokalemia: supplement, resolved. Bowels: Per protocol  Bladder: Per protocol   Sleep: Trazodone provided prn. Pain: tylenol, tramadol, robaxin PRN   DVT PPx: Eliquis   KAYE: 11/1    Van Melvin MD 10/28/2022, 9:00 AM    * This document was created using dictation software. While all precautions were taken to ensure accuracy, errors may have occurred. Please disregard any typographical errors. room air

## 2022-10-29 LAB
GLUCOSE BLD-MCNC: 156 MG/DL (ref 70–99)
GLUCOSE BLD-MCNC: 167 MG/DL (ref 70–99)
GLUCOSE BLD-MCNC: 217 MG/DL (ref 70–99)
GLUCOSE BLD-MCNC: 221 MG/DL (ref 70–99)
PERFORMED ON: ABNORMAL

## 2022-10-29 PROCEDURE — 6370000000 HC RX 637 (ALT 250 FOR IP): Performed by: PHYSICAL MEDICINE & REHABILITATION

## 2022-10-29 PROCEDURE — 1280000000 HC REHAB R&B

## 2022-10-29 PROCEDURE — 94760 N-INVAS EAR/PLS OXIMETRY 1: CPT

## 2022-10-29 RX ADMIN — ATENOLOL 25 MG: 50 TABLET ORAL at 08:31

## 2022-10-29 RX ADMIN — INSULIN LISPRO 2 UNITS: 100 INJECTION, SOLUTION INTRAVENOUS; SUBCUTANEOUS at 18:16

## 2022-10-29 RX ADMIN — ESCITALOPRAM OXALATE 5 MG: 10 TABLET ORAL at 08:30

## 2022-10-29 RX ADMIN — INSULIN LISPRO 2 UNITS: 100 INJECTION, SOLUTION INTRAVENOUS; SUBCUTANEOUS at 12:22

## 2022-10-29 RX ADMIN — INSULIN LISPRO 5 UNITS: 100 INJECTION, SOLUTION INTRAVENOUS; SUBCUTANEOUS at 08:29

## 2022-10-29 RX ADMIN — APIXABAN 5 MG: 5 TABLET, FILM COATED ORAL at 21:34

## 2022-10-29 RX ADMIN — FUROSEMIDE 20 MG: 20 TABLET ORAL at 08:30

## 2022-10-29 RX ADMIN — POTASSIUM CHLORIDE 20 MEQ: 1500 TABLET, EXTENDED RELEASE ORAL at 08:30

## 2022-10-29 RX ADMIN — INSULIN LISPRO 5 UNITS: 100 INJECTION, SOLUTION INTRAVENOUS; SUBCUTANEOUS at 18:16

## 2022-10-29 RX ADMIN — INSULIN LISPRO 5 UNITS: 100 INJECTION, SOLUTION INTRAVENOUS; SUBCUTANEOUS at 12:21

## 2022-10-29 RX ADMIN — CYANOCOBALAMIN TAB 1000 MCG 500 MCG: 1000 TAB at 08:29

## 2022-10-29 RX ADMIN — CAPSAICIN: 0.25 CREAM TOPICAL at 08:29

## 2022-10-29 RX ADMIN — APIXABAN 5 MG: 5 TABLET, FILM COATED ORAL at 08:31

## 2022-10-29 RX ADMIN — HYDROCHLOROTHIAZIDE 12.5 MG: 25 TABLET ORAL at 08:29

## 2022-10-29 RX ADMIN — ATORVASTATIN CALCIUM 40 MG: 40 TABLET, FILM COATED ORAL at 21:34

## 2022-10-29 RX ADMIN — INSULIN GLARGINE 27 UNITS: 100 INJECTION, SOLUTION SUBCUTANEOUS at 21:35

## 2022-10-29 RX ADMIN — LISINOPRIL 5 MG: 5 TABLET ORAL at 08:30

## 2022-10-29 RX ADMIN — CAPSAICIN: 0.25 CREAM TOPICAL at 21:34

## 2022-10-29 ASSESSMENT — PAIN SCALES - GENERAL: PAINLEVEL_OUTOF10: 0

## 2022-10-30 LAB
GLUCOSE BLD-MCNC: 155 MG/DL (ref 70–99)
GLUCOSE BLD-MCNC: 174 MG/DL (ref 70–99)
GLUCOSE BLD-MCNC: 194 MG/DL (ref 70–99)
GLUCOSE BLD-MCNC: 282 MG/DL (ref 70–99)
PERFORMED ON: ABNORMAL

## 2022-10-30 PROCEDURE — 94760 N-INVAS EAR/PLS OXIMETRY 1: CPT

## 2022-10-30 PROCEDURE — 1280000000 HC REHAB R&B

## 2022-10-30 PROCEDURE — 6370000000 HC RX 637 (ALT 250 FOR IP): Performed by: PHYSICAL MEDICINE & REHABILITATION

## 2022-10-30 RX ADMIN — APIXABAN 5 MG: 5 TABLET, FILM COATED ORAL at 20:36

## 2022-10-30 RX ADMIN — CAPSAICIN: 0.25 CREAM TOPICAL at 08:40

## 2022-10-30 RX ADMIN — POTASSIUM CHLORIDE 20 MEQ: 1500 TABLET, EXTENDED RELEASE ORAL at 08:39

## 2022-10-30 RX ADMIN — LISINOPRIL 5 MG: 5 TABLET ORAL at 08:39

## 2022-10-30 RX ADMIN — INSULIN LISPRO 5 UNITS: 100 INJECTION, SOLUTION INTRAVENOUS; SUBCUTANEOUS at 17:18

## 2022-10-30 RX ADMIN — CAPSAICIN: 0.25 CREAM TOPICAL at 20:37

## 2022-10-30 RX ADMIN — TRAMADOL HYDROCHLORIDE 50 MG: 50 TABLET ORAL at 20:36

## 2022-10-30 RX ADMIN — HYDROCHLOROTHIAZIDE 12.5 MG: 25 TABLET ORAL at 08:39

## 2022-10-30 RX ADMIN — INSULIN LISPRO 5 UNITS: 100 INJECTION, SOLUTION INTRAVENOUS; SUBCUTANEOUS at 12:47

## 2022-10-30 RX ADMIN — ATENOLOL 25 MG: 50 TABLET ORAL at 08:39

## 2022-10-30 RX ADMIN — INSULIN LISPRO 4 UNITS: 100 INJECTION, SOLUTION INTRAVENOUS; SUBCUTANEOUS at 12:47

## 2022-10-30 RX ADMIN — APIXABAN 5 MG: 5 TABLET, FILM COATED ORAL at 08:39

## 2022-10-30 RX ADMIN — ATORVASTATIN CALCIUM 40 MG: 40 TABLET, FILM COATED ORAL at 20:36

## 2022-10-30 RX ADMIN — ESCITALOPRAM OXALATE 5 MG: 10 TABLET ORAL at 08:39

## 2022-10-30 RX ADMIN — INSULIN LISPRO 5 UNITS: 100 INJECTION, SOLUTION INTRAVENOUS; SUBCUTANEOUS at 08:41

## 2022-10-30 RX ADMIN — INSULIN GLARGINE 27 UNITS: 100 INJECTION, SOLUTION SUBCUTANEOUS at 20:44

## 2022-10-30 ASSESSMENT — PAIN DESCRIPTION - LOCATION: LOCATION: SHOULDER

## 2022-10-30 ASSESSMENT — PAIN DESCRIPTION - ORIENTATION: ORIENTATION: RIGHT

## 2022-10-30 ASSESSMENT — PAIN SCALES - GENERAL
PAINLEVEL_OUTOF10: 7
PAINLEVEL_OUTOF10: 8

## 2022-10-30 ASSESSMENT — PAIN DESCRIPTION - DESCRIPTORS: DESCRIPTORS: OTHER (COMMENT)

## 2022-10-30 ASSESSMENT — PAIN - FUNCTIONAL ASSESSMENT: PAIN_FUNCTIONAL_ASSESSMENT: ACTIVITIES ARE NOT PREVENTED

## 2022-10-30 ASSESSMENT — PAIN DESCRIPTION - PAIN TYPE: TYPE: ACUTE PAIN

## 2022-10-31 LAB
ANION GAP SERPL CALCULATED.3IONS-SCNC: 10 MMOL/L (ref 3–16)
BUN BLDV-MCNC: 22 MG/DL (ref 7–20)
CALCIUM SERPL-MCNC: 9.2 MG/DL (ref 8.3–10.6)
CHLORIDE BLD-SCNC: 107 MMOL/L (ref 99–110)
CO2: 22 MMOL/L (ref 21–32)
CREAT SERPL-MCNC: 0.9 MG/DL (ref 0.8–1.3)
GFR SERPL CREATININE-BSD FRML MDRD: >60 ML/MIN/{1.73_M2}
GLUCOSE BLD-MCNC: 121 MG/DL (ref 70–99)
GLUCOSE BLD-MCNC: 136 MG/DL (ref 70–99)
GLUCOSE BLD-MCNC: 165 MG/DL (ref 70–99)
GLUCOSE BLD-MCNC: 189 MG/DL (ref 70–99)
GLUCOSE BLD-MCNC: 202 MG/DL (ref 70–99)
HCT VFR BLD CALC: 36.6 % (ref 40.5–52.5)
HEMOGLOBIN: 11.8 G/DL (ref 13.5–17.5)
MCH RBC QN AUTO: 25.6 PG (ref 26–34)
MCHC RBC AUTO-ENTMCNC: 32.3 G/DL (ref 31–36)
MCV RBC AUTO: 79.5 FL (ref 80–100)
PDW BLD-RTO: 14.6 % (ref 12.4–15.4)
PERFORMED ON: ABNORMAL
PLATELET # BLD: 97 K/UL (ref 135–450)
PLATELET SLIDE REVIEW: ABNORMAL
PMV BLD AUTO: 9.1 FL (ref 5–10.5)
POTASSIUM SERPL-SCNC: 4 MMOL/L (ref 3.5–5.1)
RBC # BLD: 4.61 M/UL (ref 4.2–5.9)
SLIDE REVIEW: ABNORMAL
SODIUM BLD-SCNC: 139 MMOL/L (ref 136–145)
WBC # BLD: 6.3 K/UL (ref 4–11)

## 2022-10-31 PROCEDURE — 97116 GAIT TRAINING THERAPY: CPT

## 2022-10-31 PROCEDURE — 97530 THERAPEUTIC ACTIVITIES: CPT

## 2022-10-31 PROCEDURE — 1280000000 HC REHAB R&B

## 2022-10-31 PROCEDURE — 97110 THERAPEUTIC EXERCISES: CPT

## 2022-10-31 PROCEDURE — 92507 TX SP LANG VOICE COMM INDIV: CPT

## 2022-10-31 PROCEDURE — 80048 BASIC METABOLIC PNL TOTAL CA: CPT

## 2022-10-31 PROCEDURE — 97130 THER IVNTJ EA ADDL 15 MIN: CPT

## 2022-10-31 PROCEDURE — 97129 THER IVNTJ 1ST 15 MIN: CPT

## 2022-10-31 PROCEDURE — 85027 COMPLETE CBC AUTOMATED: CPT

## 2022-10-31 PROCEDURE — 97535 SELF CARE MNGMENT TRAINING: CPT

## 2022-10-31 PROCEDURE — 6370000000 HC RX 637 (ALT 250 FOR IP): Performed by: PHYSICAL MEDICINE & REHABILITATION

## 2022-10-31 RX ORDER — INSULIN GLARGINE 100 [IU]/ML
30 INJECTION, SOLUTION SUBCUTANEOUS NIGHTLY
Status: DISCONTINUED | OUTPATIENT
Start: 2022-10-31 | End: 2022-11-01 | Stop reason: HOSPADM

## 2022-10-31 RX ADMIN — APIXABAN 5 MG: 5 TABLET, FILM COATED ORAL at 20:45

## 2022-10-31 RX ADMIN — INSULIN LISPRO 2 UNITS: 100 INJECTION, SOLUTION INTRAVENOUS; SUBCUTANEOUS at 08:35

## 2022-10-31 RX ADMIN — ATENOLOL 25 MG: 50 TABLET ORAL at 08:40

## 2022-10-31 RX ADMIN — CYANOCOBALAMIN TAB 1000 MCG 500 MCG: 1000 TAB at 08:37

## 2022-10-31 RX ADMIN — ESCITALOPRAM OXALATE 5 MG: 10 TABLET ORAL at 08:36

## 2022-10-31 RX ADMIN — APIXABAN 5 MG: 5 TABLET, FILM COATED ORAL at 08:36

## 2022-10-31 RX ADMIN — CAPSAICIN: 0.25 CREAM TOPICAL at 20:46

## 2022-10-31 RX ADMIN — FUROSEMIDE 20 MG: 20 TABLET ORAL at 08:37

## 2022-10-31 RX ADMIN — POTASSIUM CHLORIDE 20 MEQ: 1500 TABLET, EXTENDED RELEASE ORAL at 08:36

## 2022-10-31 RX ADMIN — LISINOPRIL 5 MG: 5 TABLET ORAL at 08:40

## 2022-10-31 RX ADMIN — HYDROCHLOROTHIAZIDE 12.5 MG: 25 TABLET ORAL at 08:36

## 2022-10-31 RX ADMIN — INSULIN GLARGINE 30 UNITS: 100 INJECTION, SOLUTION SUBCUTANEOUS at 20:47

## 2022-10-31 RX ADMIN — INSULIN LISPRO 5 UNITS: 100 INJECTION, SOLUTION INTRAVENOUS; SUBCUTANEOUS at 12:25

## 2022-10-31 RX ADMIN — INSULIN LISPRO 5 UNITS: 100 INJECTION, SOLUTION INTRAVENOUS; SUBCUTANEOUS at 08:34

## 2022-10-31 RX ADMIN — ATORVASTATIN CALCIUM 40 MG: 40 TABLET, FILM COATED ORAL at 20:45

## 2022-10-31 RX ADMIN — INSULIN LISPRO 5 UNITS: 100 INJECTION, SOLUTION INTRAVENOUS; SUBCUTANEOUS at 17:38

## 2022-10-31 RX ADMIN — TRAMADOL HYDROCHLORIDE 50 MG: 50 TABLET ORAL at 20:49

## 2022-10-31 ASSESSMENT — PAIN SCALES - GENERAL
PAINLEVEL_OUTOF10: 4
PAINLEVEL_OUTOF10: 2
PAINLEVEL_OUTOF10: 5
PAINLEVEL_OUTOF10: 4
PAINLEVEL_OUTOF10: 5

## 2022-10-31 ASSESSMENT — PAIN DESCRIPTION - ONSET
ONSET: ON-GOING
ONSET: ON-GOING

## 2022-10-31 ASSESSMENT — PAIN DESCRIPTION - PAIN TYPE
TYPE: ACUTE PAIN

## 2022-10-31 ASSESSMENT — PAIN DESCRIPTION - FREQUENCY
FREQUENCY: INTERMITTENT
FREQUENCY: INTERMITTENT

## 2022-10-31 ASSESSMENT — PAIN DESCRIPTION - LOCATION
LOCATION: SHOULDER

## 2022-10-31 ASSESSMENT — PAIN DESCRIPTION - ORIENTATION
ORIENTATION: RIGHT

## 2022-10-31 ASSESSMENT — PAIN - FUNCTIONAL ASSESSMENT
PAIN_FUNCTIONAL_ASSESSMENT: ACTIVITIES ARE NOT PREVENTED

## 2022-10-31 ASSESSMENT — PAIN SCALES - WONG BAKER: WONGBAKER_NUMERICALRESPONSE: 0

## 2022-10-31 NOTE — PROGRESS NOTES
[facility]   Phone: [phone]    Physical Therapy    [] Initial Evaluation            [x] Daily Treatment Note         [x] Discharge Summary      Patient: Nate Fraser   : 1960   MRN: [id number]   Date of Service:  10/31/2022  Admitting Diagnosis: Acute embolic stroke [facility])  Current Admission Summary: 76-year-old male with a history of diabetes, MI, HTN, HLD, diabetic neuropathy, and nonrheumatic aortic valve stenosis who was admitted to  on 10/11 for a scheduled TAVR procedure. The procedure was complicated by an episode of V. fib and postoperatively he was noted to have neurologic deficits and work-up revealed a thromboembolic stroke involving the left cerebral territories. Work-up including TTE was unremarkable for PFO. He was initiated on Eliquis for prophylaxis. Patient requires significant insulin at baseline with his normal home regimen of 80 units of Lantus with 20 units Humalog 3 times daily. A1c was 8.6. Due to decreased p.o. intake his regimen was adjusted. He was evaluated by therapy and suggested to continue in an inpatient setting prior to returning home. He reports he is frustrated by his situation but motivated to recover. Past Medical History:  has a past medical history of Carbuncle, DM2 (diabetes mellitus, type 2) ([address] ED (erectile dysfunction), History of heart attack, HTN (hypertension), Hyperlipidemia, Peripheral neuropathy, Sleep apnea, and Wears glasses. Past Surgical History:  has a past surgical history that includes Cardiac catheterization (); Pilonidal cyst excision; cyst removal; cardiovascular stress test (2012); cardiovascular stress test (2019); back surgery; Colonoscopy (2021); and Colonoscopy (N/A, 2021).   Discharge Recommendations: home with home health PT  DME Required For Discharge: rolling walker, wheelchair, ramp , R walker hand splint  Precautions/Restrictions: high fall risk  Weight Bearing Restrictions: weight bearing as tolerated  [] Right Upper Extremity  [] Left Upper Extremity [] Right Lower Extremity  [] Left Lower Extremity     Required Braces/Orthotics: no braces required   [] Right  [] Left  Positional Restrictions:no positional restrictions    Pre-Admission Information   Lives With: spouse    Type of Home: house  Home Layout: one level  Home Access:  5 step to enter without rails   Bathroom Layout: walker accessible, wheelchair accessible, tub only  Bathroom Equipment: hand held shower head, will use shower chair  Toilet Height: standard height- low  Home Equipment: single point cane, manual wheelchair  Transfer Assistance: Independent without use of device  Ambulation Assistance:Independent without use of device  ADL Assistance: independent with all ADL's, help with putting on socks  IADL Assistance: requires assistance with all homemaking tasks, would mow at home, handles his own medications  Active :        [x] Yes  [] No  Hand Dominance: [] Left  [x] Right  Current Employment: retired. Occupation: Engineering  Hobbies: Drive, fish, working on the car, yard work  [employer]: once a week in yard, has slipped a few times in shower        Subjective  General: Pt sitting in recliner chair on arrival. Reports feeling well but L shoulder sore from \"pulling a muscle\" this weekend. Pain: 0/10  Pain Interventions: not applicable       Functional Mobility  Bed Mobility  Supine to Sit: Independent  Sit to Supine: Independent  Rolling Left: Independent  Rolling Right: Independent  Bridging: Independent  Comments:   Transfers  Sit to stand transfer: modified independent, stand by assistance  Stand to sit transfer: modified independent  Stand pivot transfer: stand by assistance, contact guard assistance  Squat pivot transfer: stand by assistance  Car transfer: stand by assistance  Comments: Assistance with transfers continue to vary based off his fatigue levels.  Pt required verbal cues to sit in passenger seat of car with both legs on the ground to reduce risk of falls. Also required verbal cues to keep walker with him throughout the entire car transfer. Ambulation  Surface:level surface  Assistive Device: rolling walker  Assistance: contact guard assistance   Distance: 116 ft  Gait Mechanics: Inconsistent skip, step length, and step width. Demonstrates reduced weight shift to the R as he fatigues. Comments: Performed with RW splint and ace-wrap on R ankle for dorsiflexion assist.    .Ambulation Trial 2  Surface:carpet  Assistive Device: rolling walker  Assistance: contact guard assistance  Distance: 10 ft  Gait Mechanics: Reduced step length  Comments: Used reacher for object retrieval X 1  Stair Mobility  Number of Steps: 6 + 6  Step Height: 6 inch  Hand Rails: (B) handrail  Assistance: contact guard assistance  Comments: Performed 6 stairs first session, 6 stairs second session. Pt performed with step-to pattern with both ascending and descending. Wheelchair Mobility:  Chair: manual  Surface: level surface  Method: (R) LE and (L) LE  Distance: 94 ft X 2  Comments:   Balance  Static Sitting Balance: fair (-): maintains balance at CGA with use of UE support  Dynamic Sitting Balance: fair (-): maintains balance at CGA with use of UE support  Static Standing Balance: fair (-): maintains balance at CGA with use of UE support  Dynamic Standing Balance: fair (-): maintains balance at CGA with use of UE support  Comments:     Other Therapeutic Interventions  First Session:   Pt upright in chair on arrival. Pt reports feeling good, but feels like he strained a muscle in his L shoulder over the weekend. Pt performed stand pivot transfer from recliner to  with SBA. Pt was taken to car simulator and performed a car transfer as noted above. Pt was taken to the gym, where he ascended and descended 6 stairs with B handrail and CGA.  Pt also performed ambulation on carpet with object retrieval as noted above. Pt then performed bed mobility skills as noted above. Pt manually propelled wc with B LE 94 ft to return to room. Pt performed stand pivot transfer with SBA from  to recliner. Pt was left with chair alarm set and all needs within reach. Second Session:   Pt in chair on arrival. Pt performed stand pivot transfer with CGA to . Pt was donned ace-wrap around R ankle to improve dorsiflexion, and pt ambulated 116 ft with RW and CGA. Pt performed seated 4-way stability ball exercise X 10 each. Pt ascended and descended 6, 6\" stairs with B handrail and CGA. Pt also performed sidestepping at the ballet bars X 2 with CGA. Pt manually propelled wc with B LE 94 ft to return to room. Pt performed stand pivot transfer with SBA from  to recliner. Pt was left with chair alarm set and all needs within reach. Functional Outcomes                 Cognition  WFL  Orientation:    alert and oriented x 4  Command Following:   [facility]    Education  Barriers To Learning: none  Patient Education: patient educated on goals, PT role and benefits, general safety, functional mobility training, proper use of assistive device/equipment, transfer training  Learning Assessment:  patient verbalizes and demonstrates understanding    Assessment  Activity Tolerance: decreased endurance  Impairments Requiring Therapeutic Intervention: decreased functional mobility, decreased ADL status, decreased ROM, decreased strength, decreased safety awareness, decreased endurance, decreased sensation, decreased balance  Prognosis: good  Clinical Assessment:  Pt has demonstrated improvements in both functional mobility and endurance. Pt continues to lack consistent safety awareness, and will benefit from having continued family support when performing activities like ambulation or stairs navigation at home.  Pt will continue to benefit from skilled PT services to continue to promote return to PLOF and promote safety in the home and community environment. Safety Interventions: patient left in chair, chair alarm in place, call light within reach, and patient at risk for falls    Plan  Frequency: 5 x/week, 90 min/day  Current Treatment Recommendations: strengthening, ROM, balance training, functional mobility training, transfer training, gait training, stair training, endurance training, neuromuscular re-education, ADL/self-care training, and home management training    Goals  Patient Goals: Get back to driving, get back to moving around without falling   Short Term Goals:  Time Frame: 2 weeks  Patient will complete bed mobility at modified independent - met  Patient will complete transfers at modified independent - not met  Patient will ambulate 150 ft with use of LRAD at modified independent - not met  Patient will ascend/descend 8 stairs with (B) handrail at stand by assistance - not met  Patient will complete car transfer at [address], [facility] independent - not met    Therapy Session Time      Individual Group Co-treatment   Time In  0730      Time Out  0815      Minutes  45         Individual Group Co-treatment   Time In 1115      Time Out 1145      Minutes 30        Timed Code Treatment Minutes:  45 + 30      Total Treatment Minutes:  75        Electronically Signed By: Denisse Santiago, PT  2nd session:  DOM Wick  Therapist was present, directed the patient's care, made skilled judgement, and was responsible for assessment and treatment of the patient. Co-signed and supervised by:  Denisse Santiago DPT [id number]

## 2022-10-31 NOTE — PROGRESS NOTES
[facility] [facility]   Phone: [phone]    Occupational Therapy    [] Initial Evaluation            [] Daily Treatment Note         [x] Discharge Summary      Patient: Aleshia Springer   : 1960   MRN: [id number]   Date of Service:  10/31/2022    Admitting Diagnosis:  Acute embolic stroke [facility])  Current Admission Summary: 69-year-old male with a history of diabetes, MI, HTN, HLD, diabetic neuropathy, and nonrheumatic aortic valve stenosis who was admitted to  on 10/11 for a scheduled TAVR procedure. The procedure was complicated by an episode of V. fib and postoperatively he was noted to have neurologic deficits and work-up revealed a thromboembolic stroke involving the left cerebral territories. Work-up including TTE was unremarkable for PFO. He was initiated on Eliquis for prophylaxis. Patient requires significant insulin at baseline with his normal home regimen of 80 units of Lantus with 20 units Humalog 3 times daily. A1c was 8.6. Due to decreased p.o. intake his regimen was adjusted. He was evaluated by therapy and suggested to continue in an inpatient setting prior to returning home  Past Medical History:  has a past medical history of Carbuncle, DM2 (diabetes mellitus, type 2) ([address] ED (erectile dysfunction), History of heart attack, HTN (hypertension), Hyperlipidemia, Peripheral neuropathy, Sleep apnea, and Wears glasses. Past Surgical History:  has a past surgical history that includes Cardiac catheterization (); Pilonidal cyst excision; cyst removal; cardiovascular stress test (2012); cardiovascular stress test (2019); back surgery; Colonoscopy (2021); and Colonoscopy (N/A, 2021).     Discharge Recommendations: HH OT S3    DME Required For Discharge: TTB with cut out, grab bars near commode and in shower, w/c, RW, reacher, hand held shower head    Precautions/Restrictions: high fall risk, up as tolerated  Weight Bearing Restrictions: no restrictions    Required Braces/Orthotics: no braces required  Positional Restrictions:no positional restrictions    Pre-Admission Information   Lives With: spouse                  Type of Home: house  Home Layout: one level  Home Access:  5 step to enter without rails   Bathroom Layout: walker accessible, wheelchair accessible, tub/shower only  Bathroom Equipment: hand held shower head  Toilet Height: standard height- low  Home Equipment: single point cane, manual wheelchair  Transfer Assistance: Independent without use of device  Ambulation Assistance:Independent without use of device  ADL Assistance: independent with all ADL's with exception of assist to don socks- pt is IND w/ shoes pending shoe style   IADL Assistance: requires assistance with most homemaking tasks; pt IND to mow lawn on riding mower; IND with medications and light meal prep (microwave use/snacks)  Active :        [x] Yes                 [] No  Hand Dominance: [] Left                 [x] Right  Current Employment: retired. Occupation: Engineering  Hobbies: Drive, fish, working on the car, yard work  [employer]: once a week in yard, has slipped a few times in shower        Subjective:   General: Pt in recliner at entry, agreeable to OT session  Pain Interventions: not applicable        Activities of Daily Living  Basic Activities of Daily Living  General Comments: Pt declined all ADL needs- requesting shower in afternoon session  Instrumental Activities of Daily Living  No IADL completed on this date. Functional Mobility  Bed Mobility  Bed mobility not completed on this date.   Comments:  Transfers  Sit to stand transfer:stand by assistance  Stand to sit transfer: stand by assistance  Tub transfer: stand by assistance, contact guard assistance  Tub transfer equipment: tub transfer bench, grab bars, walker  Tub transfer comments: dry tub/shower transfer completed to simulate home method- increased time with min VC for problem solving- recommend pt have S. O present during transfer at home to ensure safety- pt verbalized understanding. Comments:     Functional Mobility:  Sitting Balance: supervision. Standing Balance: stand by assistance, contact guard assistance. Functional Mobility: .  contact guard assistance  Functional Mobility Activity: recliner>shower   Functional Mobility Device Use: rolling walker  Functional Mobility Comment: use of RW w/ (R) splint for mobility; CGA d/t variable R knee buckling        Additional Activity:  Pt demo'd recall of AROM of R UE of elbow flexion, arm extensions, palmar grasp and external elbow rotations, VC required for scap elevated, scap retraction. In stance to high table to completed IADL roles task addressing standing tolerance and functional ROM/grasp of R UE: pt erased medium off table w/ wash cloth, use of L UE for stability for 75% of total task, R UE in WB for 25% of task- 2 stances to completed w/ R knee block/support as needed (pt did not however buckling throughout activity)       Second Session:  Pt in recliner at entry, agreeable to session and shower, denied pain. Pt ambulated to bathroom and within with RW at [facility]. All sit<>stands completed at [facility]. Dry toilet transfer completed at [facility] w/ grab bar on L. Shower transfer to/from TTB w/ cut out at [facility] w/ grab bar support as needed (SPT to w/c post bathing d/t fatigue and dressing). Shower level ADL followed by w/c level dressing w/ stance to anterior grab bar:   UB bathing- SBA   LB bathing- SBA w/ VC for LHS use and thorough roberto hygiene   UB dressing- SBA, increased time and Min VC for problem solving    LB dressing- Baron (SBA to CGA for stance for clothing over hips, increased time to thread LE while seated, use of reacher and/or dressing stick to doff socks w/ set-up; physical assist to don only socks- equivalent to baseline for foot ware)    Oral are completed w/c level- Bethany at sink.      Pt completed w/c level IADL/item transportation task to simulate roles and assess w/c level safety: pt retrieved 3 items from kitchenette (including 1 in low cabinet)- items transported to/from table top, use of R UE functional throughout to hold items as needed- no LOB seated, good w/c mobility/navigation throughout. Pt requesting to lay in bed at EOS- sit>supine at [address] w/ [address] flat. Reviewed safety education for mobility and ADL/IADL for d/c- recommended that pt remain w/c level w/ exception of mobility in bathroom with S.O. present in order to maximize safety and reduce fall risk- pt verbalized understanding. R UE positioning reviewed w/ recommendations for R UE support while sitting and bed level secondary to evidence of slight shoulder subluxation and pain at glenohumeral joint reported- pt verbalized understanding. Pt left in bed, bed alarm on, call light and needs in reach.                Cognition  Overall Cognitive Status: WFL  Arousal/Alterness: appropriate responses to stimuli  Following Commands: follows all commands without difficulty  Attention Span: attends with cues to redirect  Safety Judgement: decreased awareness of need for assistance, decreased awareness of need for safety  Problem Solving: assistance required to implement solutions  Insights: fully aware of deficits  Initiation: requires cues for some  Sequencing: requires cues for some  Comment: cog appears overall WFL based on functional tasks completed; pt slightly impulsive at times; decreased insight into deficits  Orientation:    alert and oriented x 4  Command Following:   [facility]     Education  Barriers To Learning: none  Patient Education: patient educated on goals, OT role and benefits, plan of care, ADL adaptive strategies, IADL safety, HEP, proper use of assistive device/equipment, adaptive device training, transfer training, discharge recommendations, AROM of involved R UE  Learning Assessment:  patient verbalizes understanding, would benefit from continued reinforcement    Assessment  Activity Tolerance: tolerated well, requires rest breaks through activities. Impairments Requiring Therapeutic Intervention: decreased functional mobility, decreased ADL status, decreased ROM, decreased strength, decreased safety awareness, decreased endurance, decreased balance, decreased IADL, decreased fine motor control, decreased coordination  Prognosis: good  Clinical Assessment: Pt has progressed well throughout ARU stay- pt is now able to complete BADLs with SBA to CGA w/ fair carryover of meli-techniques and AE use. BADL transfers are now at [facility] to [facility] of 1 person. Pt's overall tolerance is progressing however rest breaks and activity pacing are still required throughout sessions. plan is to d/c w/c level as primarily mobility means secondary to safety- short distance RW mobility in bathroom required d/t home layout- pt's S.O. has received training/education throughout stay on mobility and tub/shower transfer with pt. Pt now demos increased functional use of R UE throughout all activities, continued AROM of extensors noted. Ongoing OT services required upon d/c to continue to increased pt's safety and IND with all occupational pursuits and reduce caregiver burden.     Safety Interventions: patient left in chair, chair alarm in place, call light within reach, gait belt, and patient at risk for falls    Plan  Frequency: 5 x/week, 60 min/day  Current Treatment Recommendations: strengthening, ROM, balance training, functional mobility training, transfer training, endurance training, neuromuscular re-education, ADL/self-care training, IADL training, home management training, safety education, equipment evaluation/education, and positioning    Goals  Patient Goals: \"get back to driving\", regain IND with mobility    Short Term Goals:  Time Frame: 10-14 days  Patient will complete upper body ADL at supervision - goal not met  Patient will complete lower body ADL at minimal assistance- goal met 10/28   Patient will complete toileting at minimal assistance- goal met   Patient will complete self-feeding at modified independent - goal met 10/28  Patient will complete grooming at modified independent - goal met w/c level  10/31  Patient will complete functional transfers at supervision- goal not met     Long Term Goals:  Time Frame: 2-3 weeks   Patient will complete upper body ADL at modified independent   Patient will complete lower body ADL at modified independent   Patient will complete toileting at modified independent   Patient will complete functional transfers at modified independent   Patient will complete functional mobility at modified independent   Patient to gather and transport IADL items at supervision      -pt progressing, partially met STG, did not meet LTG at this time 10/31    Therapy Session Time     Individual Group Co-treatment   Time In 1015      Time Out 1050      Minutes 35       Second Session Therapy Time:   Individual Concurrent Group Co-treatment   Time In 1245         Time Out 1335         Minutes 50             Timed Code Treatment Minutes: 50 + 35   Total Treatment Minutes:  85       Electronically Signed By: Jose Alfredo Bethea OTR/CHASE #[id number]

## 2022-10-31 NOTE — DISCHARGE INSTRUCTIONS
We hope your stay on rehab has exceeded your expectations. Once again the entire Acute Rehab Staff at [facility] wish to thank you for allowing us the privilege to care for you. A few days after you are discharged from Rehab, you will receive a survey [employer]) in the mail. This is a nationally distributed survey sent to thousands of rehab patients throughout the nation. It is very important to the staff and Dr. Noah Hampton to receive feedback based on your experience on the Rehab Unit. Thank you, we wish you good health always,         Acute Rehab Team      Hospital Preference:     [facility] [address] Diagnosis/Conditions    _______________________ (free text)    Emergency Contact:    ________________________________________Phone#________________________      Advanced Directives:    Code Status: ?  []  Full Code  ? []  DNR  ? []  [facility]  ? []  [facility]    (as of date of discharge:  _________)      Medical POA: ?  []   Yes ______________________________ ? []   No                                       (Name and phone number)                     Living Will:   ?   []   Yes    ?  []   No        Insurance Information:    _______________________ (free text)      Individual Responsible  for the coordination of the discharge/follow up:    ______________________________________________________    Functional Status:    VISUAL DEFICITS:    Yes [x]  No  []       If yes, assisted device:   Wears Glasses Yes [x]  No  []  Wears Contacts  Yes []  No  []  Legally Blind Yes []  No  []    HEARING DEFICITS:    Yes []  No  [x]       If yes, assisted device:   Wears Hearing Aids Yes []  No  []  Pocket Talker  Yes []  No  []       Physical Therapist & Contact #: Joanna Jean Baptiste [phone]  [address] #: Kalyn Silverman OTR/L, [phone]  Speech Therapist & Contact #: Demian Chavez M.A.  [phone]       Activities of Daily Living:     ADL's - Adaptive Equipment used grab bars (commode, shower), reacher, dressing stick, shower chair with back/tub transfer bench, hand held showerhead      []  Independent ? []  Modified Independent ? [x]  Supervision                [x]  Minimal Assistance ? []  Moderate Assistance ? []  Maximal Assistance   Recommend assist for bathing to increase patient safety- bathing to be completed seated on tub transfer bench to limit need to stand due to safety concerns. Driving Restriction:      [x]  YES   [] NO : Patient should contact primary care physician for referral for  evaluation prior to returning to driving. Mobility:     Ambulation: Device __rolling walker with Right hand splint___________________ (free text)     []  Independent ? []  Modified Independent ? []  Supervision                [x]  Contact Guard Assistance ? []  Moderate Assistance ? []  Maximal Assistance     Stairs:  Device ____none_________________ (free text)    Number of stairs: __6___________ (free text)    Handrails:    [] Right   [] Left      [x] Bilateral   []  Independent ? []  Modified Independent ? []  Supervision                [x]  Contact Guard Assistance ? []  Moderate Assistance ? []  Maximal Assistance         Current Diet Consistency:?       [x]  Regular   [] Soft and Bite-Sized  ? [] Minced and Moist     ?[]  Puree     Current Liquid Level:?         [x] Thin Liquids     [] Mildly Thick (Nectar) ?     [] Moderately Thick (Honey)    [] Pudding Thick    [] [employer] Water Protocol     Dietary Restrictions:?      []  General Diet   []  Tube Feed, w/ Diet   []  Tube Feed, NPO   [x]  Carb Control   []  Cardiac   []  Renal    []  Other:

## 2022-10-31 NOTE — PROGRESS NOTES
[facility] Department   Phone: [phone]    Speech Therapy    [] Initial Evaluation            [x] Daily Treatment Note         [x] Discharge Summary      Patient: Arik Mendez   : 1960   MRN: [id number]   Date of Service:  10/31/2022  Admitting Diagnosis: Acute embolic stroke [facility])  Current Admission Summary: 78-year-old male with a history of diabetes, MI, HTN, HLD, diabetic neuropathy, and nonrheumatic aortic valve stenosis who was admitted to  on 10/11 for a scheduled TAVR procedure. The procedure was complicated by an episode of V. fib and postoperatively he was noted to have neurologic deficits and work-up revealed a thromboembolic stroke involving the left cerebral territories. Work-up including TTE was unremarkable for PFO. He was initiated on Eliquis for prophylaxis. Patient requires significant insulin at baseline with his normal home regimen of 80 units of Lantus with 20 units Humalog 3 times daily. A1c was 8.6. Due to decreased p.o. intake his regimen was adjusted. He was evaluated by therapy and suggested to continue in an inpatient setting prior to returning home. He reports he is frustrated by his situation but motivated to recover. Past Medical History:  has a past medical history of Carbuncle, DM2 (diabetes mellitus, type 2) ([address] ED (erectile dysfunction), History of heart attack, HTN (hypertension), Hyperlipidemia, Peripheral neuropathy, Sleep apnea, and Wears glasses. Past Surgical History:  has a past surgical history that includes Cardiac catheterization (); Pilonidal cyst excision; cyst removal; cardiovascular stress test (2012); cardiovascular stress test (2019); back surgery; Colonoscopy (2021); and Colonoscopy (N/A, 2021).   Precautions/Restrictions: high fall risk, up as tolerated  Instrumental Swallow Study: FEES at outside hospital completed on 10/12/2022      Pre-Admission Information   Living Status: Pt lives with his significant other Bin Augustin, a dog and cat. 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 Motivated to participate in therapy. Pleasant throughout sessions. Pain: Pt reported pain in his R shoulder when he moved. Denied need for pain intervention. Safety Interventions: patient left in chair and chair alarm in place       Therapeutic Interventions:     Session 1:   Dysphagia: Severity: Within functional limits (tolerating least restrictive diet)   Goal Met     Motor Speech: Severity: Mild  and Moderate    Goal not targeted this session    Cognition: Severity: Mild   Functional problem solving   -pt expressing desire to continue driving upon d/c stating he has been driving since he was 11, originally resistant to rehabilitative driving program through [facility], but following verbal education pt agreeable. Provided ongoing education re: changes in reaction time, attention and executive function s/p CVA (pt v/u and receptive to education)   -pt with good insight to deficits stating it would be difficult to drive currently due to reduced reaction time and R sided weakness   -unable to state situation when he should use wheel chair rather than walker despite min cues. Provided education for recommendation to be w/c level when significant other not present due to pt still being a high fall risk. Pt v/u but suspect requires reminders. Provided education to patient to think through any physical mobility tasks and make sure he has support present before he does something 'he did before the stroke'.      Pt was administered the [place] Cognitive Assessment [facility]) to assess current cognitive functioning and view progress prior to d/c. Pt's composite score was 21 out of 30 (+1 < 12 yr edu). A normal score range is > 26. The following categories were assessed: [place] Cognitive Assessment (MoCA)     Section Subtest Score Comments   Visuospatial/ Executive [place] making 1/1      Copy Cube 0/1  Incorrect proportions     Clock 2/3  Numbers; upside down 4&5   Naming Picture naming 3/3     Attention Number repetition 2/2      Selective attention 1/1      Serial 7s 2/3     Language Repetition 2/2      Fluency 0/1 Pt named 9/11 abstract items in 1 minute    Abstraction Comparisons 2/2     Delayed Recall Recall 5 novel words 0/5 3 w category cue, 2 with binary choice    Orientation Spatial and Temporal 5/6  incorrect date      Total: 21/30 (+1 < 12 yr edu) >26/30 is considered WNL     * Of note, Pt does not have a formal dementia diagnosis. The above score and cognitive stage is not indicative of diagnosis rather used to direct treatment tasks and establish goals. Additional Interventions:     Session 2:   Dysphagia: Severity: Within functional limits  (tolerating least restrictive diet)   Goal Met     Motor Speech: Severity: Mild  and Moderate   Attempted NMES Vital Stim but unable to connect pt due to pt not recently shaving.     Oral motor exercises (OME) to target R labial strength and ROM (utilized mirror to promote facial symmetry with tasks)   -Labial retraction/protrusion x 10   -Jaw drop/ open mouth pucker x 10   -labial retraction (\"eee\")/ Lip rounding (\"ooo\") x 10   -unilateral alternating cheek puffs x 10   -bilateral cheek puffs against resistance x 10    -buccal strengthening (fish face) x 10   -jaw opening against resistance x 10   -lip smacks x 10  -unilateral labial retraction attempted, pt with difficulty on both sides, task discontinued   -pt able to increase symmetry utilizing mirror for visual feedback on R sided weakness      Motor speech exercises  -Pt instructed to focus on going slow, speaking loudly, and over articulating.   -pt read multi syllabic words x 10, repeating each word x 3, pt self correcting words with blended articulation x 4   -pt read sentences containing multi syllabic words x 10, final 5 recorded for self reflection   -pt noted his speech was about 70% back to baseline. Pt stating, \"I have good days and bad days, today is a bad day. \" Pt agreeable that rapid speech rate causes him to have increased blended word boundaries. Pt states one of his friend's who called him stated he still sounded like he was 'drunk'   -pt repeated dysarthric words from sentence x 3, pt with dysarthric production of words x 5         Cognition: Severity: Mild   MoCA results   -results and rational of MoCA administration discussed w/ pt  -pt with largest deficits in short term memory, pt reports difficulties with short term memory at baseline   -pt independently using memory/cognitive  strategies at home (witting things down, repetition of important information)   -education provided re: relaying concerns to PCP with possible referral to a neuro psychologist for more in-depth testing      Additional Interventions: Pt with concerns about his short term memory prior to the stroke stating his dad had dementia and he had to take over his paperwork. Discussed if he notices his memory progressively decline or notices any increased difficulties in his day to day life to inform his PCP and request a neuropsych evaluation. Education  Barriers To Learning: none  Patient Education: Provided education regarding role of SLP, rationale for treatment tasks and general speech pathology plan of care.    Learning Assessment: Pt verbalized understanding     Assessment  Impairments Requiring Therapeutic Intervention: Dysarthria , Cognitive-Linguistic Deficits , and Oropharyngeal Dysphagia   Prognosis: good    Clinical Assessment:  Pt continues to present with Mild  Dysarthria and Cognitive-Linguistic Deficits, primarily in the area of short term memory deficit which was a concern for pt prior to CVA. Pt is tolerating a least restrictive diet and has met his dysphagia goal. Pt has improved with his overall attention and ability to complete higher level executive function tasks requiring less cueing. Improvement noted with speech, however, pt continues to report being 'off' from his baseline rating it at 70%. Pt to d/c home with significant other and home health. Continue to target right oral motor strength and coordination to facilitate speech precision in order for return to baseline level as subjectively rated by pt, pt's family and SLP, as well as higher level cognitive tasks for safe return to prior level of independence. Diet Solids Recommendation:   Liquid Consistency Recommendation:   Recommended Form of Meds:   Regular texture diet     Thin liquids     Meds whole with water       Recommended Compensatory Swallowing Strategies: Upright as possible with all PO intake , No straws , Small bites/sips , Eat/feed slowly, Remain upright 30-45 min       Plan  Frequency: 60 minutes/day; 5 days per week, as tolerated, until goals met, or discharged from ARU. Therapeutic Interventions: Diet Tolerance Monitoring  Speech / Motor Planning / Voice intervention  and Cognitive-Linguistic intervention   Discharge Recommendations: Home with assistance    Continued SLP at Discharge: Yes     Goals  Patient Goals: \"improve my speech\"   Time Frame: 2 weeks     Pt will tolerate recommended diet and advanced trials with no overt s/s of aspiration. Goal Met   Pt will improve oral motor strength and ROM for coordinated and alternating motions, via graded tasks to improve speech back to baseline level as subjectively rated by SLP/pt and family. Goal not met (70% acc from baseline)   Pt will improve executive function for multifactor task completion via graded tasks to 80% accuracy.  Goal Met (86% acc across tasks)   Pt will improve executive function for multifactor task completion via graded tasks to 90% accuracy. Goal not met (85% acc across tasks)   Pt will improve attention to detail for graded complex information to 80%, for improved recall and retention of newly learned information. Goal Met (91% acc across tasks)         Therapy Session Time      Session 1 Session 2    Time In 0830 0930   Time Out 0900 1000    Time Code Minutes 30 10   Individual Minutes 30 30     Timed Code Treatment Minutes: 40  Total Treatment Minutes:  60    Electronically Signed By:  Salbador SEGUNDO CCC-SLP #[id number] 10/31/2022 2:03 PM  Speech-Language Pathologist    Bird SARAH,  Speech-Language Pathology     The speech-language pathologist was present, directed the patient's care, made skilled judgment and was responsible for assessment and treatment.

## 2022-10-31 NOTE — PLAN OF CARE
Problem: Discharge Planning  Goal: Discharge to home or other facility with appropriate resources  10/31/2022 1144 by Eric Johnson RN  Outcome: Progressing  10/30/2022 2328 by Shelby Suggs RN  Outcome: Progressing     Problem: Skin/Tissue Integrity  Goal: Absence of new skin breakdown  Description: 1. Monitor for areas of redness and/or skin breakdown  2. Assess vascular access sites hourly  3. Every 4-6 hours minimum:  Change oxygen saturation probe site  4. Every 4-6 hours:  If on nasal continuous positive airway pressure, respiratory therapy assess nares and determine need for appliance change or resting period.   10/31/2022 1144 by Eric Johnson RN  Outcome: Progressing  10/30/2022 2328 by Shelby Suggs RN  Outcome: Progressing     Problem: Safety - Adult  Goal: Free from fall injury  10/31/2022 1144 by Eric Johnson RN  Outcome: Progressing  10/30/2022 2328 by Shelby Suggs RN  Outcome: Progressing     Problem: ABCDS Injury Assessment  Goal: Absence of physical injury  10/31/2022 1144 by Eric Johnson RN  Outcome: Progressing  10/30/2022 2328 by Shelby Suggs RN  Outcome: Progressing     Problem: Pain  Goal: Verbalizes/displays adequate comfort level or baseline comfort level  10/31/2022 1144 by Eric Johnson RN  Outcome: Progressing  10/30/2022 2328 by Shelby Suggs RN  Outcome: Progressing     Problem: Chronic Conditions and Co-morbidities  Goal: Patient's chronic conditions and co-morbidity symptoms are monitored and maintained or improved  Outcome: Progressing

## 2022-10-31 NOTE — PROGRESS NOTES
Mary Loaiza  10/31/2022  [id number]    Chief Complaint: Acute embolic stroke ([address]    Subjective:   No significant weekend events. No current complaints. Labs reviewed. Asking about medications as he gets his thru the [place]. ROS: No CP, SOB, dyspnea    Objective:  Patient Vitals for the past 24 hrs:   BP Temp Temp src Pulse Resp SpO2   10/31/22 0836 121/72 98 °F (36.7 °C) Oral 75 16 98 %   10/30/22 2106 -- -- -- -- 15 --   10/30/22 2015 114/65 98 °F (36.7 °C) Oral 62 18 97 %       Gen: No distress, pleasant. Resting in WC  HEENT: Normocephalic, atraumatic  CV: Regular rate and rhythm. No MRG   Resp: No respiratory distress. CTAB   Abd: Soft, nontender nondistended  Ext: No edema   Neuro: Alert, oriented, appropriately interactive. RUE 4-/5 diffusely, RLE 4/5 diffusely    Laboratory data: Available via EMR. Therapy progress:    PT    Supine to Sit: Partial/moderate assistance  Sit to Supine:     Sit to Stand: Partial/moderate assistance  Chair/Bed to Chair Transfer: Partial/moderate assistance  Car Transfer: Partial/moderate assistance  Ambulation 10 ft: Partial/moderate assistance  Ambulation 50 ft: Partial/moderate assistance  Ambulation 150 ft:    Stairs - 1 Step: Partial/moderate assistance  Stairs - 4 Step: Partial/moderate assistance  Stairs - 12 Step: Partial/moderate assistance    OT    Eating: Independent  Oral Hygiene: Setup or clean-up assistance  Bathing: Supervision or touching assistance  Upper Body Dressing: Supervision or touching assistance  Lower Body Dressing: Supervision or touching assistance  Toilet Transfer: Supervision or touching assistance  Toilet Hygiene: Partial/moderate assistance    Speech Therapy    Pt continues to present with Mild  Dysarthria , Cognitive-Linguistic Deficits , and Oropharyngeal Dysphagia . Pt has improved with his overall attention and ability to complete higher level executive function tasks requiring less cueing.  Goals met, increased accuracy of executive function goal. Improvement noted with speech, however, pt continues to report being 'off' from his baseline. Continue to target right oral motor strength and coordination to facilitate speech precision in order for return to baseline level as subjectively rated by pt, pt's family and SLP. Continue to target higher level cognitive tasks for safe return to prior level of independence. Body mass index is 39.93 kg/m². Assessment:  Patient Active Problem List   Diagnosis    HTN (hypertension)    DM2 (diabetes mellitus, type 2) ([facility]    High cholesterol    Acute embolic stroke ([facility]       Plan:   Left cerebral hemisphere thromboembolic infarcts: Eliquis, statin. PT/OT/SLP. Started lexapro. R AFO ordered. Nonrheumatic aortic valve stenosis: S/p TAVR on 10/11. Eliquis     DM: A1c 8.6, Lantus 27 -> 30 (80), prandial 5 (20), and SSI     DM neuropathy: no current meds     CAD: eliquis, statin     HTN: atenolol 25, lisinopril 5, HCTZ 12.5     HLD: lipitor 40     Hypokalemia: supplement, resolved. Bowels: Per protocol  Bladder: Per protocol   Sleep: Trazodone provided prn. Pain: tylenol, tramadol, robaxin PRN   DVT PPx: Eliquis   KAYE: 11/1    Dispo: Prep d/c for tomorrow. Lali Lerma MD 10/31/2022, 9:09 AM    * This document was created using dictation software. While all precautions were taken to ensure accuracy, errors may have occurred. Please disregard any typographical errors.

## 2022-11-01 VITALS
BODY MASS INDEX: 39.84 KG/M2 | SYSTOLIC BLOOD PRESSURE: 122 MMHG | DIASTOLIC BLOOD PRESSURE: 73 MMHG | HEART RATE: 67 BPM | WEIGHT: 278.3 LBS | OXYGEN SATURATION: 97 % | HEIGHT: 70 IN | TEMPERATURE: 98.2 F | RESPIRATION RATE: 18 BRPM

## 2022-11-01 LAB
GLUCOSE BLD-MCNC: 142 MG/DL (ref 70–99)
PERFORMED ON: ABNORMAL

## 2022-11-01 PROCEDURE — 6370000000 HC RX 637 (ALT 250 FOR IP): Performed by: PHYSICAL MEDICINE & REHABILITATION

## 2022-11-01 RX ORDER — HYDROCHLOROTHIAZIDE 12.5 MG/1
12.5 TABLET ORAL DAILY
Qty: 10 TABLET | Refills: 0 | Status: SHIPPED | OUTPATIENT
Start: 2022-11-01

## 2022-11-01 RX ORDER — FUROSEMIDE 20 MG/1
20 TABLET ORAL EVERY OTHER DAY
Qty: 15 TABLET | Refills: 0 | Status: SHIPPED | OUTPATIENT
Start: 2022-11-02

## 2022-11-01 RX ORDER — LISINOPRIL 5 MG/1
5 TABLET ORAL DAILY
Qty: 10 TABLET | Refills: 0 | Status: SHIPPED | OUTPATIENT
Start: 2022-11-01

## 2022-11-01 RX ORDER — ATORVASTATIN CALCIUM 40 MG/1
40 TABLET, FILM COATED ORAL NIGHTLY
Qty: 10 TABLET | Refills: 0 | Status: SHIPPED | OUTPATIENT
Start: 2022-11-01

## 2022-11-01 RX ORDER — ESCITALOPRAM OXALATE 5 MG/1
5 TABLET ORAL DAILY
Qty: 10 TABLET | Refills: 0 | Status: SHIPPED | OUTPATIENT
Start: 2022-11-01

## 2022-11-01 RX ORDER — INSULIN GLARGINE 100 [IU]/ML
INJECTION, SOLUTION SUBCUTANEOUS
Qty: 24 ML | Refills: 3
Start: 2022-11-01

## 2022-11-01 RX ADMIN — LISINOPRIL 5 MG: 5 TABLET ORAL at 09:39

## 2022-11-01 RX ADMIN — CAPSAICIN: 0.25 CREAM TOPICAL at 09:37

## 2022-11-01 RX ADMIN — ATENOLOL 25 MG: 50 TABLET ORAL at 09:39

## 2022-11-01 RX ADMIN — INSULIN LISPRO 5 UNITS: 100 INJECTION, SOLUTION INTRAVENOUS; SUBCUTANEOUS at 09:39

## 2022-11-01 RX ADMIN — ESCITALOPRAM OXALATE 5 MG: 10 TABLET ORAL at 09:38

## 2022-11-01 RX ADMIN — HYDROCHLOROTHIAZIDE 12.5 MG: 25 TABLET ORAL at 09:38

## 2022-11-01 RX ADMIN — APIXABAN 5 MG: 5 TABLET, FILM COATED ORAL at 09:38

## 2022-11-01 RX ADMIN — POTASSIUM CHLORIDE 20 MEQ: 1500 TABLET, EXTENDED RELEASE ORAL at 09:38

## 2022-11-01 ASSESSMENT — PAIN DESCRIPTION - DESCRIPTORS: DESCRIPTORS: PATIENT UNABLE TO DESCRIBE

## 2022-11-01 ASSESSMENT — PAIN SCALES - GENERAL: PAINLEVEL_OUTOF10: 2

## 2022-11-01 NOTE — PLAN OF CARE
Problem: Discharge Planning  Goal: Discharge to home or other facility with appropriate resources  11/1/2022 1210 by Saulo Haley RN  Outcome: Progressing  11/1/2022 0116 by Sonali Seo RN  Outcome: Progressing     Problem: Skin/Tissue Integrity  Goal: Absence of new skin breakdown  Description: 1. Monitor for areas of redness and/or skin breakdown  2. Assess vascular access sites hourly  3. Every 4-6 hours minimum:  Change oxygen saturation probe site  4. Every 4-6 hours:  If on nasal continuous positive airway pressure, respiratory therapy assess nares and determine need for appliance change or resting period.   11/1/2022 1210 by Saulo Haley RN  Outcome: Progressing  11/1/2022 0116 by Sonali Seo RN  Outcome: Progressing     Problem: Safety - Adult  Goal: Free from fall injury  11/1/2022 1210 by Saulo Haley RN  Outcome: Progressing  11/1/2022 0116 by Sonali Seo RN  Outcome: Progressing     Problem: ABCDS Injury Assessment  Goal: Absence of physical injury  11/1/2022 1210 by Saulo Haley RN  Outcome: Progressing  11/1/2022 0116 by Sonali Seo RN  Outcome: Progressing     Problem: Pain  Goal: Verbalizes/displays adequate comfort level or baseline comfort level  11/1/2022 1210 by Saulo Haley RN  Outcome: Progressing  11/1/2022 0116 by Sonali Seo RN  Outcome: Progressing     Problem: Chronic Conditions and Co-morbidities  Goal: Patient's chronic conditions and co-morbidity symptoms are monitored and maintained or improved  Outcome: Progressing

## 2022-11-01 NOTE — PROGRESS NOTES
[facility] Discharge Assessment    Transportation  \"Has lack of transportation kept you from medical appointments, meetings, work, or from getting things needed for daily living? \"Check all that apply:  [] A.  Yes, it has kept me from medical appointments or from getting my medications  [] B.  Yes, it has kept me from non-medical meetings, appointments, work, or from getting things that I need  [x] C.  No  [] X. Patient unable to respond  [] Y. Patient declines to respond    Provision of Current Reconciled Medication List to Subsequent Provider at Discharge  [] No, current reconciled medication list not provided to the subsequent provider. [x] Yes, current reconciled medication list provided to the subsequent provider. (**Select route of transmission below**)   [] Via Electronic Health Record   [x] [employer]  [] Verbal (e.g. in person, telephone, video conferencing)  [] Paper-based (e.g. fax, copies, printouts)   [] Other Methods (e.g. texting, email, CDs)    Provision of Current Reconciled Medication List to Patient at Discharge  [] No, current reconciled medication list not provided to the patient, family and/or caregiver. [x] Yes, current reconciled medication list provided to the patient, family and/or caregiver.  (**Select route of transmission below**)   [] Via Electronic Health Record (e.g., electronic access to patient portal)   [] Via [employer]  [x] Verbal (e.g. in person, telephone, video conferencing)  [x] Paper-based (e.g. fax, copies, printouts)   [] Other Methods (e.g. texting, email, CDs)    Health Literacy  \"How often do you need to have someone help you when you read instructions, pamphlets, or other written material from your doctor or pharmacy? \"  [x]  0. Never  []  1. Rarely  []  2. Sometimes  []  3. Often  []  4. Always  []  8.   Patient unable to respond    BIMS - **Must be completed in the flowsheet at discharge prior to proceeding with Delirium Assessment**  [x] BIMS completed in flowsheet at discharge    Signs and Symptoms of Delirium  A. Acute Onset Mental Status Change - Is there evidence of an acute change in mental status from the patient's baseline? [x] 0. No  [] 1. Yes    B. Inattention - Did the patient have difficulty focusing attention, for example being easily distractible or having difficulty keeping track of what was being said? [x]  0. Behavior not present  []  1. Behavior continuously present, does not fluctuate  []  2. Behavior present, fluctuates (comes and goes, changes in severity)    C. Disorganized thinking - Was the patient's thinking disorganized or incoherent (rambling or irrelevant conversation, unclear or illogical flow of ideas, or unpredictable switching from subject to subject)? [x]  0. Behavior not present  []  1. Behavior continuously present, does not fluctuate  []  2. Behavior present, fluctuates (comes and goes, changes in severity)    D. Altered level of consciousness - Did the patient have altered level of consciousness as indicated by any of the following criteria? Vigilant - startled easily to any sound or touch  Lethargic - repeatedly dozed off while being asked questions, but responded to voice or touch  Stuporous - very difficulty to arouse and keep aroused for the interview  Comatose - could not be aroused  [x]  0. Behavior not present  []  1. Behavior continuously present, does not fluctuate  []  2. Behavior present, fluctuates (comes and goes, changes in severity)    Mood    \"Over the last 2 weeks, have you been bothered by any of the following problems?\" 1. Symptom Presence    0 = No  1 = Yes  9 = No Response 2.  Symptom Frequency    0 = Never or 1 day  1 = 2-6 days (several days)  2 = 7-11 days (half or more of the days)  3 = 12-14 days (nearly every day)  **Leave blank if 'No Reponse'**      Enter scores in boxes    Column 1 Column 2   Little interest or pleasure in doing things   0 0   Feeling down, depressed, or hopeless   0 0   **If either A or B in column 2 is coded 2 or 3, CONTINUE asking the questions below. If not, END the interview. **     Trouble falling or staying asleep, or sleeping too much       Feeling tired or having little energy       Poor appetite or overeating       Feeling bad about yourself - or that you are a failure or have let yourself or your family down       Trouble concentrating on things, such as reading the newspaper or watching television       Moving or speaking so slowly that other people could have noticed. Or the opposite- being so fidgety or restless that you have been moving around a lot more than usual.       Thoughts that you would be better off dead, or of hurting yourself in some way. Total Severity: Add scores for all frequency responses in column 2 (possible score 0-27, or enter 99 if unable to complete (if symptom frequency (column 2) is blank for 3 or more items). 0     Social Isolation  \"How often do you feel lonely or isolated from those around you? \"  [x] 0. Never  [] 1. Rarely  [] 2. Sometimes  [] 3. Often  [] 4. Always  [] 7. Patient declines to respond  [] 8. Patient unable to respond    Pain Effect on Sleep  \"Over the past 5 days, how much of the time has pain made it hard for you to sleep at night? \"  []  0. Does not apply - I have not had any pain or hurting in the past 5 days  [x]  1. Rarely or not at all  []  2. Occasionally  []  3. Frequently  []  4. Almost constantly  []  8. Unable to answer    **If the patient answers \"0. Does not apply\" to this question, skip the next two \"Pain Effect. Luisa Danes Luisa Danes \" questions**    Pain Interference with Therapy Activities  \"Over the past 5 days, how often have you limited your participation in rehabilitation therapy sessions due to pain? \"  []  0. Does not apply - I have not received rehabilitation therapy in the past 5 days  [x]  1. Rarely or not at all  []  2. Occasionally  []  3. Frequently  []  4. Almost constantly  []  8. Unable to answer    Pain Interference with Day-to-Day Activities: \"Over the past 5 days, how often have you limited your day-to-day activities (excluding rehabilitation therapy session)? \"  [x]  1. Rarely or not at all  []  2. Occasionally  []  3. Frequently  []  4. Almost constantly  []  8. Unable to answer    Nutritional Approaches  Last 7 Days - Check all of the following nutritional approaches that were received within the last 7 days:  []  A. Parenteral/IV feeding  []  B. Feeding tube (e.g., nasogastric or abdominal (PEG))  []  C. Mechanically altered diet - requires change in texture of food or liquids (e.g., pureed food, thickened liquids)  [x]  D. Therapeutic diet (e.g., low salt, diabetic, low cholesterol)  []  Z. None of the above    At time of Discharge - Check all of the following nutritional approaches that were being received at discharge:  []  A. Parenteral/IV feeding (including IV fluids if needed for hydration, but not as part of dialysis/chemo)  []  B. Feeding tube (e.g., nasogastric or abdominal (PEG))  []  C. Mechanically altered diet - requires change in texture of food or liquids (e.g., pureed food, thickened liquids)  [x]  D. Therapeutic diet (e.g., low salt, diabetic, low cholesterol  []  Z. None of the above    High Risk Drug Classes:  Use and Indication    Is taking: Check if the pt is taking any medications by pharmacological classification, not how it is used, in the following classes  Indication noted:  If column 1 is checked, check if there is an indication noted for all meds in the drug class Is taking  (check all that apply) Indication noted (check all that apply)   Antipsychotic [] []   Anticoagulant [x] [x]   Antibiotic [] []   Opioid [x] [x]   Antiplatelet [] []   Hypoglycemic (including insulin) [x] [x]   None of the above []     Special Treatments, Procedures, and Programs    Check all of the following treatments, procedures, and programs that apply at discharge. At Discharge (check all that apply)   Cancer Treatments   A1. Chemotherapy []           A2. IV []           A3. Oral []           A10. Other []   B1. Radiation []   Respiratory Therapies   C1. Oxygen Therapy []           C2. Continuous (continuously for at least 14 hours per day) []           C3. Intermittent []           C4. High-concentration []   D1. Suctioning (Does not include oral suctioning) []           D2. Scheduled []           D3. As needed []   E1. Tracheostomy Care []   F1. Invasive Mechanical Ventilator (ventilator or respirator) []   G1. Non-invasive Mechanical Ventilator []           G2. BiPAP []           G3. CPAP []   Other   H1. IV Medications (Do not include sub Q pumps, flushes, Dextrose 50% or lactated ringers) []           H2. Vasoactive medications []           H3. Antibiotics []           H4. Anticoagulation []           H10. Other []   I1. Transfusions []   J1. Dialysis []           J2. Hemodialysis []           J3. Peritoneal dialysis []   O1. IV access (including a catheter in a vein) []           O2. Peripheral []           O3. Midline []           O4. Central (PICC, tunneled, port) []      None of the above (select if no Cancer, Respiratory, or Other boxes are checked) [x]    [facility] Discharge Assessment    Transportation  \"Has lack of transportation kept you from medical appointments, meetings, work, or from getting things needed for daily living? \"Check all that apply:  [] A.  Yes, it has kept me from medical appointments or from getting my medications  [] B.  Yes, it has kept me from non-medical meetings, appointments, work, or from getting things that I need  [x] C.  No  [] X. Patient unable to respond  [] Y. Patient declines to respond    Provision of Current Reconciled Medication List to Subsequent Provider at Discharge  [] No, current reconciled medication list not provided to the subsequent provider.   [x] Yes, current reconciled medication list provided to the subsequent provider. (**Select route of transmission below**)   [x] Via Electronic Health Record   [] [employer]  [] Verbal (e.g. in person, telephone, video conferencing)  [] Paper-based (e.g. fax, copies, printouts)   [] Other Methods (e.g. texting, email, CDs)    Provision of Current Reconciled Medication List to Patient at Discharge  [] No, current reconciled medication list not provided to the patient, family and/or caregiver. [x] Yes, current reconciled medication list provided to the patient, family and/or caregiver.  (**Select route of transmission below**)   [] Via Electronic Health Record (e.g., electronic access to patient portal)   [] Via Health Information Exchange Organization  [x] Verbal (e.g. in person, telephone, video conferencing)  [x] Paper-based (e.g. fax, copies, printouts)   [] Other Methods (e.g. texting, email, CDs)    Health Literacy  \"How often do you need to have someone help you when you read instructions, pamphlets, or other written material from your doctor or pharmacy? \"  [x]  0. Never  []  1. Rarely  []  2. Sometimes  []  3. Often  []  4. Always  []  8. Patient unable to respond    BIMS - **Must be completed in the flowsheet at discharge prior to proceeding with Delirium Assessment**  [x] BIMS completed in flowsheet at discharge    Signs and Symptoms of Delirium  A. Acute Onset Mental Status Change - Is there evidence of an acute change in mental status from the patient's baseline? [x] 0. No  [] 1. Yes    B. Inattention - Did the patient have difficulty focusing attention, for example being easily distractible or having difficulty keeping track of what was being said? [x]  0. Behavior not present  []  1. Behavior continuously present, does not fluctuate  []  2. Behavior present, fluctuates (comes and goes, changes in severity)    C.   Disorganized thinking - Was the patient's thinking disorganized or incoherent (rambling or irrelevant conversation, unclear or illogical flow of ideas, or unpredictable switching from subject to subject)? [x]  0. Behavior not present  []  1. Behavior continuously present, does not fluctuate  []  2. Behavior present, fluctuates (comes and goes, changes in severity)    D. Altered level of consciousness - Did the patient have altered level of consciousness as indicated by any of the following criteria? Vigilant - startled easily to any sound or touch  Lethargic - repeatedly dozed off while being asked questions, but responded to voice or touch  Stuporous - very difficulty to arouse and keep aroused for the interview  Comatose - could not be aroused  [x]  0. Behavior not present  []  1. Behavior continuously present, does not fluctuate  []  2. Behavior present, fluctuates (comes and goes, changes in severity)    Mood    \"Over the last 2 weeks, have you been bothered by any of the following problems?\" 1. Symptom Presence    0 = No  1 = Yes  9 = No Response 2. Symptom Frequency    0 = Never or 1 day  1 = 2-6 days (several days)  2 = 7-11 days (half or more of the days)  3 = 12-14 days (nearly every day)  **Leave blank if 'No Reponse'**      Enter scores in boxes    Column 1 Column 2   Little interest or pleasure in doing things   0 0   Feeling down, depressed, or hopeless   0 0   **If either A or B in column 2 is coded 2 or 3, CONTINUE asking the questions below. If not, END the interview. **     Trouble falling or staying asleep, or sleeping too much       Feeling tired or having little energy       Poor appetite or overeating       Feeling bad about yourself - or that you are a failure or have let yourself or your family down       Trouble concentrating on things, such as reading the newspaper or watching television       Moving or speaking so slowly that other people could have noticed.   Or the opposite- being so fidgety or restless that you have been moving around a lot more than usual.       Thoughts that you would be better off dead, or of hurting yourself in some way. Total Severity: Add scores for all frequency responses in column 2 (possible score 0-27, or enter 99 if unable to complete (if symptom frequency (column 2) is blank for 3 or more items). 0     Social Isolation  \"How often do you feel lonely or isolated from those around you? \"  [x] 0. Never  [] 1. Rarely  [] 2. Sometimes  [] 3. Often  [] 4. Always  [] 7. Patient declines to respond  [] 8. Patient unable to respond    Pain Effect on Sleep  \"Over the past 5 days, how much of the time has pain made it hard for you to sleep at night? \"  []  0. Does not apply - I have not had any pain or hurting in the past 5 days  [x]  1. Rarely or not at all  []  2. Occasionally  []  3. Frequently  []  4. Almost constantly  []  8. Unable to answer    **If the patient answers \"0. Does not apply\" to this question, skip the next two \"Pain Effect. Loli Grijalva \" questions**    Pain Interference with Therapy Activities  \"Over the past 5 days, how often have you limited your participation in rehabilitation therapy sessions due to pain? \"  []  0. Does not apply - I have not received rehabilitation therapy in the past 5 days  [x]  1. Rarely or not at all  []  2. Occasionally  []  3. Frequently  []  4. Almost constantly  []  8. Unable to answer    Pain Interference with Day-to-Day Activities: \"Over the past 5 days, how often have you limited your day-to-day activities (excluding rehabilitation therapy session)? \"  [x]  1. Rarely or not at all  []  2. Occasionally  []  3. Frequently  []  4. Almost constantly  []  8. Unable to answer    Nutritional Approaches  Last 7 Days - Check all of the following nutritional approaches that were received within the last 7 days:  []  A. Parenteral/IV feeding  []  B. Feeding tube (e.g., nasogastric or abdominal (PEG))  []  C.   Mechanically altered diet - requires change in texture of food or liquids (e.g., pureed food, thickened liquids)  [x]  D. Therapeutic diet (e.g., low salt, diabetic, low cholesterol)  []  Z. None of the above    At time of Discharge - Check all of the following nutritional approaches that were being received at discharge:  []  A. Parenteral/IV feeding (including IV fluids if needed for hydration, but not as part of dialysis/chemo)  []  B. Feeding tube (e.g., nasogastric or abdominal (PEG))  []  C. Mechanically altered diet - requires change in texture of food or liquids (e.g., pureed food, thickened liquids)  [x]  D. Therapeutic diet (e.g., low salt, diabetic, low cholesterol  []  Z. None of the above    High Risk Drug Classes:  Use and Indication    Is taking: Check if the pt is taking any medications by pharmacological classification, not how it is used, in the following classes  Indication noted: If column 1 is checked, check if there is an indication noted for all meds in the drug class Is taking  (check all that apply) Indication noted (check all that apply)   Antipsychotic [] []   Anticoagulant [x] [x]   Antibiotic [] []   Opioid [x] [x]   Antiplatelet [] []   Hypoglycemic (including insulin) [x] []   None of the above []     Special Treatments, Procedures, and Programs    Check all of the following treatments, procedures, and programs that apply at discharge. At Discharge (check all that apply)   Cancer Treatments   A1. Chemotherapy []           A2. IV []           A3. Oral []           A10. Other []   B1. Radiation []   Respiratory Therapies   C1. Oxygen Therapy []           C2. Continuous (continuously for at least 14 hours per day) []           C3. Intermittent []           C4. High-concentration []   D1. Suctioning (Does not include oral suctioning) []           D2. Scheduled []           D3. As needed []   E1. Tracheostomy Care []   F1.  Invasive Mechanical Ventilator (ventilator or respirator) []   G1. Non-invasive Mechanical Ventilator [] G2. BiPAP []           G3. CPAP []   Other   H1. IV Medications (Do not include sub Q pumps, flushes, Dextrose 50% or lactated ringers) []           H2. Vasoactive medications []           H3. Antibiotics []           H4. Anticoagulation []           H10. Other []   I1. Transfusions []   J1. Dialysis []           J2. Hemodialysis []           J3. Peritoneal dialysis []   O1. IV access (including a catheter in a vein) []           O2. Peripheral []           O3. Midline []           O4.  Central (PICC, tunneled, port) []      None of the above (select if no Cancer, Respiratory, or Other boxes are checked) [x]

## 2022-11-01 NOTE — PLAN OF CARE
Problem: Discharge Planning  Goal: Discharge to home or other facility with appropriate resources  11/1/2022 0116 by Eliz Patel RN  Outcome: Progressing     Problem: Skin/Tissue Integrity  Goal: Absence of new skin breakdown  Description: 1. Monitor for areas of redness and/or skin breakdown  2. Assess vascular access sites hourly  3. Every 4-6 hours minimum:  Change oxygen saturation probe site  4. Every 4-6 hours:  If on nasal continuous positive airway pressure, respiratory therapy assess nares and determine need for appliance change or resting period.   11/1/2022 0116 by Eliz Patel RN  Outcome: Progressing     Problem: Safety - Adult  Goal: Free from fall injury  11/1/2022 0116 by Eliz Patel RN  Outcome: Progressing     Problem: ABCDS Injury Assessment  Goal: Absence of physical injury  11/1/2022 0116 by Eliz Patel RN  Outcome: Progressing     Problem: Pain  Goal: Verbalizes/displays adequate comfort level or baseline comfort level  11/1/2022 0116 by lEiz Patel RN  Outcome: Progressing

## 2022-11-01 NOTE — PLAN OF CARE
Problem: Discharge Planning  Goal: Discharge to home or other facility with appropriate resources  11/1/2022 1211 by Pia Garcia RN  Outcome: Completed  11/1/2022 1210 by Pia Garcia RN  Outcome: Progressing  11/1/2022 0116 by Israel Flowers RN  Outcome: Progressing     Problem: Skin/Tissue Integrity  Goal: Absence of new skin breakdown  Description: 1. Monitor for areas of redness and/or skin breakdown  2. Assess vascular access sites hourly  3. Every 4-6 hours minimum:  Change oxygen saturation probe site  4. Every 4-6 hours:  If on nasal continuous positive airway pressure, respiratory therapy assess nares and determine need for appliance change or resting period.   11/1/2022 1211 by Pia Garcia RN  Outcome: Completed  11/1/2022 1210 by Pia Garcia RN  Outcome: Progressing  11/1/2022 0116 by Israel Flowers RN  Outcome: Progressing     Problem: Safety - Adult  Goal: Free from fall injury  11/1/2022 1211 by Pia Garcia RN  Outcome: Completed  11/1/2022 1210 by Pia Garcia RN  Outcome: Progressing  11/1/2022 0116 by Israel Flowers RN  Outcome: Progressing     Problem: ABCDS Injury Assessment  Goal: Absence of physical injury  11/1/2022 1211 by Pia Garcia RN  Outcome: Completed  11/1/2022 1210 by Pia Garcia RN  Outcome: Progressing  11/1/2022 0116 by Israel Flowers RN  Outcome: Progressing     Problem: Pain  Goal: Verbalizes/displays adequate comfort level or baseline comfort level  11/1/2022 1211 by Pia Garcia RN  Outcome: Completed  11/1/2022 1210 by Pia Garcia RN  Outcome: Progressing  11/1/2022 0116 by Israel Flowers RN  Outcome: Progressing     Problem: Chronic Conditions and Co-morbidities  Goal: Patient's chronic conditions and co-morbidity symptoms are monitored and maintained or improved  11/1/2022 1211 by Pia Garcia RN  Outcome: Completed  11/1/2022 1210 by Pia Garcia RN  Outcome:

## 2022-11-01 NOTE — PROGRESS NOTES
Discharge instructions reviewed with patient and sig. Other and all questions answered. Patient discharged via w/c with all his personal belongings and sig other at side.

## 2022-11-01 NOTE — CARE COORDINATION
Patient discharged home via car with girlfriend . Patient to receive home nursing and therapy from [facility]. Patient received all DME from [facility].  No further needs voiced by patient or treatment team.     Haylie Aguilar, KAMI, LSW   [phone]

## 2022-11-01 NOTE — CARE COORDINATION
MILO spoke with Sd Crawford RN @ the [address] [place] [address] regarding patient's discharge. RN stating that patient has been assigned to [facility]  ([phone]/  [phone] (fax)and patient's girlfriend has been given a rolling walker for the patient. RN stating that the orders for other needed DME have been received and that DME will be delivered to patient's home. MLIO met with patient and his girlfriend. Patient is aware that [facility] is the [address] [facility] agency that will follow him @ home. Patient has [facility]'s contact information. Patient has the walker from the [address] [facility] . MILO informed patient's RN , Allison Ward.     Electronically signed by KAMI Gudino on 11/1/2022 at 12:08 PM

## 2022-11-01 NOTE — DISCHARGE SUMMARY
Physical Medicine & Rehabilitation  Discharge Summary     Patient Identification:  Gil Das  : 1960  Admit date: 10/18/2022  Discharge date:   2022  Attending provider: Josiah Reynolds MD        Primary care provider: MECHE Bolanos CNP     Discharge Diagnoses:   Patient Active Problem List   Diagnosis    HTN (hypertension)    DM2 (diabetes mellitus, type 2) ([facility] [address]    High cholesterol    Acute embolic stroke [facility])       Discharge Functional Status:      Physical therapy:  Supine to Sit: Independent  Sit to Supine: Independent      Sit to Stand: Independent  Chair/Bed to Chair Transfer: Supervision or touching assistance  Car Transfer: Supervision or touching assistance     Ambulation 10 ft: Supervision or touching assistance  Ambulation 50 ft: Supervision or touching assistance  Ambulation 150 ft:    Stairs - 1 Step: Supervision or touching assistance  Stairs - 4 Step: Supervision or touching assistance  Stairs - 12 Step: Partial/moderate assistance    Occupational therapy:  Eating: Independent  Oral Hygiene: Independent  Bathing: Supervision or touching assistance  Upper Body Dressing: Supervision or touching assistance  Lower Body Dressing: Supervision or touching assistance     Toilet Transfer: Supervision or touching assistance  Toilet Hygiene: Supervision or touching assistance    Speech therapy:    Pt continues to present with Mild  Dysarthria and Cognitive-Linguistic Deficits, primarily in the area of short term memory deficit which was a concern for pt prior to CVA. Pt is tolerating a least restrictive diet and has met his dysphagia goal. Pt has improved with his overall attention and ability to complete higher level executive function tasks requiring less cueing. Improvement noted with speech, however, pt continues to report being 'off' from his baseline rating it at 70%. Pt to d/c home with significant other and home health.  Continue to target right oral motor strength and coordination to facilitate speech precision in order for return to baseline level as subjectively rated by pt, pt's family and SLP, as well as higher level cognitive tasks for safe return to prior level of independence. Inpatient Rehabilitation Course:   Nataly Veliz is a 60 y.o. male admitted to inpatient rehabilitation on 29/95/3481 s/p Acute embolic stroke ([facility] [address] The patient participated in an aggressive multidisciplinary inpatient rehabilitation program involving 3 hours of therapy per day, at least 5 days per week. He progressed well in therapy and was able to be discharged home with home health care. His medical rehab course was significant for below:    Left cerebral hemisphere thromboembolic infarcts: Eliquis, statin. PT/OT/SLP. Started lexapro. R AFO ordered. Nonrheumatic aortic valve stenosis: S/p TAVR on 10/11. Eliquis     DM: A1c 8.6, Lantus 30 (80), prandial 5 (20), and SSI. Continue this regimen at discharge. Suggest close follow-up with PCP for adjustments. DM neuropathy: no current meds     CAD: eliquis, statin     HTN: atenolol 25, lisinopril 5, HCTZ 12.5. Continue this regimen at discharge. HLD: lipitor 40     Hypokalemia: supplement, resolved prior to discharge. Discharge Exam:  Patient Vitals for the past 24 hrs:   BP Temp Temp src Pulse Resp SpO2   10/31/22 2119 -- -- -- -- 18 --   10/31/22 2030 137/77 97.8 °F (36.6 °C) Oral 58 18 98 %     Gen: No distress, pleasant. Resting in bedside chair  HEENT: Normocephalic, atraumatic  CV: Regular rate and rhythm. No MRG   Resp: No respiratory distress. CTAB   Abd: Soft, nontender nondistended  Ext: No edema   Neuro: Alert, oriented, appropriately interactive.  RUE 4-/5 diffusely, RLE 4/5 diffusely    Significant Diagnostics:   Lab Results   Component Value Date    CREATININE 0.9 10/31/2022    BUN 22 (H) 10/31/2022     10/31/2022    K 4.0 10/31/2022     10/31/2022    CO2 22 10/31/2022       Lab Results Component Value Date    WBC 6.3 10/31/2022    HGB 11.8 (L) 10/31/2022    HCT 36.6 (L) 10/31/2022    MCV 79.5 (L) 10/31/2022    PLT 97 (L) 10/31/2022       Disposition:  Home in stable condition. Follow-up:  See after visit summary from hospitalization    Discharge Medications:     Medication List        START taking these medications      escitalopram 5 MG tablet  Commonly known as: LEXAPRO  Take 1 tablet by mouth daily     hydroCHLOROthiazide 12.5 MG tablet  Commonly known as: HYDRODIURIL  Take 1 tablet by mouth daily     lisinopril 5 MG tablet  Commonly known as: PRINIVIL;ZESTRIL  Take 1 tablet by mouth daily            CHANGE how you take these medications      atorvastatin 40 MG tablet  Commonly known as: LIPITOR  Take 1 tablet by mouth nightly  What changed:   medication strength  how much to take  additional instructions     Basaglar KwikPen 100 UNIT/ML injection pen  Generic drug: insulin glargine  INJECT 30 UNITS SUBCUTANEOUSLY ONCE DAILY WITH SUPPER  What changed: additional instructions     furosemide 20 MG tablet  Commonly known as: LASIX  Take 1 tablet by mouth every other day  Start taking on: November 2, 2022  What changed:   when to take this  additional instructions     insulin aspart 100 UNIT/ML injection cartridge  Commonly known as: NovoLOG  Inject 5 Units into the skin 3 times daily (before meals)  What changed: how much to take            CONTINUE taking these medications      apixaban 5 MG Tabs tablet  Commonly known as: ELIQUIS  Notes to patient: Use: Anticoagulant.   Used in people with A-fib to lower their chances of a stroke  Side Effects: Skin rash, itching, and swelling     atenolol 25 MG tablet  Commonly known as: Tenormin  Take 1 tablet by mouth daily  Notes to patient: USES: Treatment of high blood pressure  SIDE EFFECTS: Nausea, dizziness, lightheadedness, tireness     Insulin Syringe-Needle U-100 31G X 5/16\" 1 ML Misc  Commonly known as: [employer] Insulin Syringe  1 each by Does not apply route daily     melatonin 3 MG Tabs tablet  Notes to patient: Uses:(hormone) used for insomnia  Side Effects:headache, short-term feeling of depression, dizziness, stomach cramps     OCUVITE ADULT FORMULA PO  Notes to patient: Uses: Vitamin good for eye health  Side effects: Constipation or diarrhea, upset stomach       potassium chloride 10 MEQ extended release capsule  Commonly known as: MICRO-K  Notes to patient: Uses: prevent or treat low levels  Side Effects: rash, hives,swelling, itching     vitamin B-12 500 MCG tablet  Commonly known as: CYANOCOBALAMIN  Notes to patient: Use:  Help prevents a type of anemia  Side Effects:  diarrhea, itching, and skin rash.             STOP taking these medications      aspirin 81 MG EC tablet     capsaicin 0.025 % cream  Commonly known as: ZOSTRIX     glipiZIDE 10 MG extended release tablet  Commonly known as: Glucotrol XL     lisinopril-hydroCHLOROthiazide 20-12.5 MG per tablet  Commonly known as: PRINZIDE;ZESTORETIC     methocarbamol 500 MG tablet  Commonly known as: ROBAXIN     pioglitazone 30 MG tablet  Commonly known as: Actos     pioglitazone 45 MG tablet  Commonly known as: Actos     pregabalin 75 MG capsule  Commonly known as: LYRICA     Semaglutide 3 MG Tabs               Where to Get Your Medications        These medications were sent to [facility] [address] [place], [place] [address] [address] [phone] Yelena Long [phone]  [place] [id number] [facility]      Phone: [phone]   atorvastatin 40 MG tablet  escitalopram 5 MG tablet  furosemide 20 MG tablet  hydroCHLOROthiazide 12.5 MG tablet  lisinopril 5 MG tablet       Information about where to get these medications is not yet available    Ask your nurse or doctor about these medications  Darinaglkennedy AcevesikPen 100 UNIT/ML injection pen  insulin aspart 100 UNIT/ML injection cartridge         I spent over 35 minutes on this discharge encounter between counseling, coordination of care, and medication reconciliation. To comply with [address] [facility] R.II.4.1:  Discharge order placed in advance to facilitate patient's discharge needs. Jozef Abraham MD    * This document was created using dictation software. While all precautions were taken to ensure accuracy, errors may have occurred. Please disregard any typographical errors.
